# Patient Record
Sex: FEMALE | Race: ASIAN | HISPANIC OR LATINO | Employment: PART TIME | URBAN - METROPOLITAN AREA
[De-identification: names, ages, dates, MRNs, and addresses within clinical notes are randomized per-mention and may not be internally consistent; named-entity substitution may affect disease eponyms.]

---

## 2020-03-20 ENCOUNTER — HOSPITAL ENCOUNTER (OUTPATIENT)
Dept: URGENT CARE | Facility: CLINIC | Age: 25
Discharge: HOME | End: 2020-03-20

## 2020-03-22 LAB — BACTERIA SPEC AEROBE CULT: NORMAL

## 2021-02-19 ENCOUNTER — HOSPITAL ENCOUNTER (OUTPATIENT)
Dept: OTHER | Facility: HOSPITAL | Age: 26
Discharge: HOME | End: 2021-02-19
Attending: NURSE PRACTITIONER

## 2021-02-22 LAB — HBV SURFACE AB SER QL: REACTIVE

## 2021-06-14 LAB
BACTERIA SPEC AEROBE CULT: NO GROWTH
C TRACH RRNA SPEC DONR QL NAA+PROBE: NORMAL
EXTERNAL ABO GROUPING: NORMAL
EXTERNAL ANTIBODY SCREEN: NORMAL
EXTERNAL HEPATITIS B SURFACE ANTIGEN: NEGATIVE
EXTERNAL RH FACTOR: POSITIVE
EXTERNAL SYPHILIS RPR SCREEN: NEGATIVE
HCV AB S/CO SERPL IA: NORMAL
HIV 1+2 AB+HIV1 P24 AG SERPL QL IA: NORMAL
N GONORRHOEA DNA SPEC QL NAA+PROBE: NORMAL
RUBV IGG SERPL IA-ACNC: NORMAL

## 2021-09-23 ENCOUNTER — ROUTINE PRENATAL (OUTPATIENT)
Dept: OBSTETRICS AND GYNECOLOGY | Facility: CLINIC | Age: 26
End: 2021-09-23

## 2021-09-23 VITALS — WEIGHT: 143 LBS | SYSTOLIC BLOOD PRESSURE: 115 MMHG | DIASTOLIC BLOOD PRESSURE: 75 MMHG

## 2021-09-23 DIAGNOSIS — Z3A.24 24 WEEKS GESTATION OF PREGNANCY: Primary | ICD-10-CM

## 2021-09-23 LAB
DEPRECATED RDW RBC AUTO: 40.4 FL (ref 37–54)
ERYTHROCYTE [DISTWIDTH] IN BLOOD BY AUTOMATED COUNT: 12.5 % (ref 12.3–15.4)
GLUCOSE 1H P GLC SERPL-MCNC: 123 MG/DL (ref 65–139)
GLUCOSE UR STRIP-MCNC: NEGATIVE MG/DL
HCT VFR BLD AUTO: 30.3 % (ref 34–46.6)
HGB BLD-MCNC: 10.2 G/DL (ref 12–15.9)
MCH RBC QN AUTO: 30 PG (ref 26.6–33)
MCHC RBC AUTO-ENTMCNC: 33.7 G/DL (ref 31.5–35.7)
MCV RBC AUTO: 89.1 FL (ref 79–97)
PLATELET # BLD AUTO: 141 10*3/MM3 (ref 140–450)
PMV BLD AUTO: 10.5 FL (ref 6–12)
PROT UR STRIP-MCNC: NEGATIVE MG/DL
RBC # BLD AUTO: 3.4 10*6/MM3 (ref 3.77–5.28)
WBC # BLD AUTO: 7.39 10*3/MM3 (ref 3.4–10.8)

## 2021-09-23 PROCEDURE — 85027 COMPLETE CBC AUTOMATED: CPT | Performed by: OBSTETRICS & GYNECOLOGY

## 2021-09-23 PROCEDURE — 0502F SUBSEQUENT PRENATAL CARE: CPT | Performed by: OBSTETRICS & GYNECOLOGY

## 2021-09-23 PROCEDURE — 82950 GLUCOSE TEST: CPT | Performed by: OBSTETRICS & GYNECOLOGY

## 2021-09-23 RX ORDER — PRENATAL VIT/IRON FUM/FOLIC AC 27MG-0.8MG
1 TABLET ORAL DAILY
COMMUNITY

## 2021-09-23 NOTE — PROGRESS NOTES
OB FOLLOW UP  CC- Here for care of pregnancy        Anna TOURE is a 26 y.o.  24w0d patient being seen today for her obstetrical follow up visit. Patient reports no complaints.     Her prenatal care is complicated by (and status) :    There is no problem list on file for this patient.      Flu Status: Desires at future appt  Covid Status:    ROS -   Patient Reports : No Problems  Patient Denies: Loss of Fluid, Vaginal Spotting, Vision Changes, Headaches, Nausea , Vomiting , Contractions and Epigastric pain  Fetal Movement : normal  All other systems reviewed and are negative.       The additional following portions of the patient's history were reviewed and updated as appropriate: allergies, current medications, past family history, past medical history, past social history, past surgical history and problem list.    I have reviewed and agree with the HPI, ROS, and historical information as entered above. My Rico DO    /75   Wt 64.9 kg (143 lb)   LMP 2021       EXAM:     FHT: 156 BPM   Uterine Size: 24 cm  Pelvic Exam: No    Urine glucose/protein: See prenatal flowsheet       Assessment and Plan  Diagnoses and all orders for this visit:    1. 24 weeks gestation of pregnancy (Primary)  -     POC Urinalysis Dipstick  -     Gestational Diabetes Screen; Future  -     CBC (No Diff); Future         1. Pregnancy at 24w0d  2. Fetal status reassuring.   3. Activity and Exercise discussed.  4. Return in about 4 weeks (around 10/21/2021).   5. Continue prenatal vitamins    My Rico DO  2021

## 2021-09-24 ENCOUNTER — TELEPHONE (OUTPATIENT)
Dept: OBSTETRICS AND GYNECOLOGY | Facility: CLINIC | Age: 26
End: 2021-09-24

## 2021-09-24 DIAGNOSIS — O99.019 IRON DEFICIENCY ANEMIA DURING PREGNANCY: Primary | ICD-10-CM

## 2021-09-24 DIAGNOSIS — D50.9 IRON DEFICIENCY ANEMIA DURING PREGNANCY: Primary | ICD-10-CM

## 2021-09-24 RX ORDER — FERROUS SULFATE 325(65) MG
325 TABLET ORAL EVERY OTHER DAY
Qty: 30 TABLET | Refills: 1 | Status: SHIPPED | OUTPATIENT
Start: 2021-09-24 | End: 2022-01-24

## 2021-09-24 NOTE — TELEPHONE ENCOUNTER
----- Message from My Rico DO sent at 9/24/2021  9:03 AM EDT -----  No pharmacy has been listed.  Please obtain a pharmacy so I can prescribe iron

## 2021-10-21 ENCOUNTER — ROUTINE PRENATAL (OUTPATIENT)
Dept: OBSTETRICS AND GYNECOLOGY | Facility: CLINIC | Age: 26
End: 2021-10-21

## 2021-10-21 VITALS — WEIGHT: 146 LBS | SYSTOLIC BLOOD PRESSURE: 121 MMHG | DIASTOLIC BLOOD PRESSURE: 76 MMHG

## 2021-10-21 DIAGNOSIS — Z3A.28 28 WEEKS GESTATION OF PREGNANCY: Primary | ICD-10-CM

## 2021-10-21 DIAGNOSIS — D50.9 IRON DEFICIENCY ANEMIA DURING PREGNANCY: ICD-10-CM

## 2021-10-21 DIAGNOSIS — O99.019 IRON DEFICIENCY ANEMIA DURING PREGNANCY: ICD-10-CM

## 2021-10-21 PROBLEM — Z34.90 SUPERVISION OF NORMAL PREGNANCY: Status: ACTIVE | Noted: 2021-10-21

## 2021-10-21 LAB
GLUCOSE UR STRIP-MCNC: NEGATIVE MG/DL
PROT UR STRIP-MCNC: NEGATIVE MG/DL

## 2021-10-21 PROCEDURE — 0502F SUBSEQUENT PRENATAL CARE: CPT | Performed by: OBSTETRICS & GYNECOLOGY

## 2021-10-21 NOTE — PROGRESS NOTES
OB FOLLOW UP  CC- Here for care of pregnancy        Anna TOURE is a 26 y.o.  28w0d patient being seen today for her obstetrical follow up visit. Patient reports no complaints.     Her prenatal care is complicated by (and status) :    Patient Active Problem List   Diagnosis   • Supervision of normal pregnancy       Flu Status: Desires at future appt  Covid Status:    ROS -   Patient Reports : No Problems  Patient Denies: Loss of Fluid, Vaginal Spotting, Vision Changes, Headaches, Nausea , Vomiting , Contractions and Epigastric pain  Fetal Movement : normal  All other systems reviewed and are negative.       The additional following portions of the patient's history were reviewed and updated as appropriate: allergies, current medications, past family history, past medical history, past social history, past surgical history and problem list.    I have reviewed and agree with the HPI, ROS, and historical information as entered above. My Rico DO    /76   Wt 66.2 kg (146 lb)   LMP 2021       EXAM:     FHT: 155 BPM   Uterine Size: 32 cm  Pelvic Exam: No  1 hour glucola nml Hgb 10 Hct 30  Urine glucose/protein: See prenatal flowsheet       Assessment and Plan  Diagnoses and all orders for this visit:    1. 28 weeks gestation of pregnancy (Primary)    2. Iron deficiency anemia during pregnancy  -     POC Urinalysis Dipstick         1. Pregnancy at 28w0d  2. Fetal status reassuring.   3. Activity and Exercise discussed.  4. Return in about 2 weeks (around 2021) for Please schedule q 2 wks 36 wk & wks til del, PLEASE SCHEDULE ULTRASOUND AT 36-37 WEEKS.   5. Continue with prenatal vitamins and iron    My Rico DO  10/21/2021

## 2021-11-05 ENCOUNTER — ROUTINE PRENATAL (OUTPATIENT)
Dept: OBSTETRICS AND GYNECOLOGY | Facility: CLINIC | Age: 26
End: 2021-11-05

## 2021-11-05 VITALS — SYSTOLIC BLOOD PRESSURE: 123 MMHG | WEIGHT: 150 LBS | DIASTOLIC BLOOD PRESSURE: 78 MMHG

## 2021-11-05 DIAGNOSIS — Z34.80 SUPERVISION OF OTHER NORMAL PREGNANCY: Primary | ICD-10-CM

## 2021-11-05 DIAGNOSIS — B00.9 HSV-1 INFECTION: ICD-10-CM

## 2021-11-05 DIAGNOSIS — O99.019 IRON DEFICIENCY ANEMIA DURING PREGNANCY: ICD-10-CM

## 2021-11-05 DIAGNOSIS — Z34.03 ENCOUNTER FOR SUPERVISION OF NORMAL FIRST PREGNANCY IN THIRD TRIMESTER: ICD-10-CM

## 2021-11-05 DIAGNOSIS — D50.9 IRON DEFICIENCY ANEMIA DURING PREGNANCY: ICD-10-CM

## 2021-11-05 LAB
GLUCOSE UR STRIP-MCNC: NEGATIVE MG/DL
PROT UR STRIP-MCNC: NEGATIVE MG/DL

## 2021-11-05 PROCEDURE — 0502F SUBSEQUENT PRENATAL CARE: CPT | Performed by: STUDENT IN AN ORGANIZED HEALTH CARE EDUCATION/TRAINING PROGRAM

## 2021-11-05 NOTE — PROGRESS NOTES
OB FOLLOW UP  Complaint   Chief Complaint   Patient presents with   • Routine Prenatal Visit            Anna TOURE is a 26 y.o.  30w1d patient being seen today for her obstetrical follow up visit. Patient denies decreased fetal movement, contractions, loss of fluid or vaginal bleeding.     Her prenatal care is complicated by (and status) :    Patient Active Problem List   Diagnosis   • Supervision of normal pregnancy   • HSV-1 infection   • Iron deficiency anemia during pregnancy       All other systems reviewed and are negative.     The additional following portions of the patient's history were reviewed and updated as appropriate: allergies, current medications, past family history, past medical history, past social history, past surgical history and problem list.      EXAM:     Vital signs: /78   Wt 68 kg (150 lb)   LMP 2021   Appearance/psychiatric: To be in no distress  Constitutional: The patient is well nourished.  Cardiovascular: She does not have edema.  Respiratory: Respiratory effort is normal.  Gastrointestinal: Abdomen is soft, gravid, nontender, no rashes, heart tones are present, fundal height is size equals dates    Pelvic Exam: No    Urine glucose/protein: See prenatal flowsheet       Assessment and Plan    Problem List Items Addressed This Visit        Gravid and     Supervision of normal pregnancy    Overview     COLLINS finalized: L=20  O+/neg  Genetic testing:    • NIPS wnl XX    Vaccinations:  • COVID:  2021; Pfizer  • Influenza: 2021 10/4/2021  • Tdap: 2021 10/29    24-28 weeks:  • Tdap Rx:  • Rhogam:  Not indicated  • ?BTL/Bilat salpingectomy:    Third Trimester:  • GBS:  • Breast pump:  • ?HSV: HSV-1     Ultrasound:  • Anatomy:  • FU US:    PROBLEM LIST/PLAN:                  Hematology and Neoplasia    Iron deficiency anemia during pregnancy    Overview     2021: Repeat 32 weeks.          Relevant Medications    ferrous sulfate 325 (65  FE) MG tablet       Other    HSV-1 infection    Overview     11/5/2021 History of HSV-1; does perform oral intercourse. Suppression recommended at 36 weeks            Other Visit Diagnoses     Supervision of other normal pregnancy    -  Primary    Relevant Orders    POC Urinalysis Dipstick (Completed)          Impression  1. Pregnancy at 30w1d  2. Fetal status reassuring.   3. Activity and Exercise discussed.    Plan  1.  Follow-up 2 weeks  2.  Repeat CBC at 32 weeks  3.  REcommend suppression with Valtrex at 36 weeks      Patient was counseled to the following pregnancy precautions:  • Decreased fetal movement, if concern for decreased fetal movement please perform fetal kick counts you are looking for 10 movements in 2 hours.  If concern for fetal movement and not meeting that criteria, please present to triage for evaluation.  • Contractions occurring every 5 minutes for over an hour, lasting 30 to 60 seconds and progressively causing more discomfort, please seek medical attention to rule out labor  • If you believe that your water is broken, place a sanitary pad.  If pad fills in short period of time i.e. less than 5 minutes, take off pad placed another pad.  If this is saturated please present for rule out rupture of membranes  • Vaginal bleeding can be normal in pregnancy, this usually takes a form of spotting.  If having heavier bleeding like a menstrual period please present for evaluation; especially in light of severe abdominal pain this could represent a placental abruption.  • Keep all scheduled appointments as recommended.        Keenan Coto MD  11/05/2021

## 2021-11-19 ENCOUNTER — ROUTINE PRENATAL (OUTPATIENT)
Dept: OBSTETRICS AND GYNECOLOGY | Facility: CLINIC | Age: 26
End: 2021-11-19

## 2021-11-19 VITALS — DIASTOLIC BLOOD PRESSURE: 77 MMHG | SYSTOLIC BLOOD PRESSURE: 115 MMHG | WEIGHT: 154 LBS

## 2021-11-19 DIAGNOSIS — O99.019 IRON DEFICIENCY ANEMIA DURING PREGNANCY: ICD-10-CM

## 2021-11-19 DIAGNOSIS — R03.0 ELEVATED BP WITHOUT DIAGNOSIS OF HYPERTENSION: ICD-10-CM

## 2021-11-19 DIAGNOSIS — B00.9 HSV-1 INFECTION: ICD-10-CM

## 2021-11-19 DIAGNOSIS — Z34.80 SUPERVISION OF OTHER NORMAL PREGNANCY: ICD-10-CM

## 2021-11-19 DIAGNOSIS — D50.9 IRON DEFICIENCY ANEMIA DURING PREGNANCY: ICD-10-CM

## 2021-11-19 DIAGNOSIS — Z34.03 ENCOUNTER FOR SUPERVISION OF NORMAL FIRST PREGNANCY IN THIRD TRIMESTER: Primary | ICD-10-CM

## 2021-11-19 LAB
DEPRECATED RDW RBC AUTO: 39.4 FL (ref 37–54)
ERYTHROCYTE [DISTWIDTH] IN BLOOD BY AUTOMATED COUNT: 12.1 % (ref 12.3–15.4)
GLUCOSE UR STRIP-MCNC: NEGATIVE MG/DL
HCT VFR BLD AUTO: 33.9 % (ref 34–46.6)
HGB BLD-MCNC: 11.8 G/DL (ref 12–15.9)
MCH RBC QN AUTO: 31 PG (ref 26.6–33)
MCHC RBC AUTO-ENTMCNC: 34.8 G/DL (ref 31.5–35.7)
MCV RBC AUTO: 89 FL (ref 79–97)
PLATELET # BLD AUTO: 154 10*3/MM3 (ref 140–450)
PMV BLD AUTO: 11.1 FL (ref 6–12)
PROT UR STRIP-MCNC: NEGATIVE MG/DL
RBC # BLD AUTO: 3.81 10*6/MM3 (ref 3.77–5.28)
WBC NRBC COR # BLD: 10.19 10*3/MM3 (ref 3.4–10.8)

## 2021-11-19 PROCEDURE — 0502F SUBSEQUENT PRENATAL CARE: CPT | Performed by: STUDENT IN AN ORGANIZED HEALTH CARE EDUCATION/TRAINING PROGRAM

## 2021-11-19 PROCEDURE — 85027 COMPLETE CBC AUTOMATED: CPT | Performed by: STUDENT IN AN ORGANIZED HEALTH CARE EDUCATION/TRAINING PROGRAM

## 2021-11-19 NOTE — PROGRESS NOTES
OB FOLLOW UP  Complaint   Chief Complaint   Patient presents with   • Routine Prenatal Visit            Anna TOURE is a 26 y.o.  32w1d patient being seen today for her obstetrical follow up visit. Patient denies decreased fetal movement, contractions, loss of fluid or vaginal bleeding.  Patient denies any headache, visual disturbances, new onset nausea vomiting, right upper quadrant pain, or new onset swelling.      Her prenatal care is complicated by (and status) :    Patient Active Problem List   Diagnosis   • Supervision of normal pregnancy   • HSV-1 infection   • Iron deficiency anemia during pregnancy   • Elevated BP without diagnosis of hypertension       All other systems reviewed and are negative.     The additional following portions of the patient's history were reviewed and updated as appropriate: allergies, current medications, past family history, past medical history, past social history, past surgical history and problem list.      EXAM:     Vital signs: /77   Wt 69.9 kg (154 lb)   LMP 2021   Appearance/psychiatric: To be in no distress  Constitutional: The patient is well nourished.  Cardiovascular: She does not have edema.  Respiratory: Respiratory effort is normal.  Gastrointestinal: Abdomen is soft, gravid, nontender, no rashes, heart tones are present, fundal height is size equals dates    Pelvic Exam: No    Urine glucose/protein: See prenatal flowsheet       Assessment and Plan    Problem List Items Addressed This Visit        Cardiac and Vasculature    Elevated BP without diagnosis of hypertension    Overview     2021 mildly elevated diastolic.  Taken when patient presented to room.            Gravid and     Supervision of normal pregnancy - Primary    Overview     COLLINS finalized: L=20  O+/neg  Genetic testing:    • NIPS wnl XX    Vaccinations:  • COVID:  2021; Pfizer  • Influenza: 2021 10/4/2021  • Tdap: 2021 10/29    24-28  weeks:  • Tdap Rx:  • Rhogam:  Not indicated  • ?BTL/Bilat salpingectomy:    Third Trimester:  • GBS:  • Breast pump:  • ?HSV: HSV-1     Ultrasound:  • Anatomy:  • FU US:    PROBLEM LIST/PLAN:                  Hematology and Neoplasia    Iron deficiency anemia during pregnancy    Overview     11/5/2021: Repeat 32 weeks.          Relevant Medications    ferrous sulfate 325 (65 FE) MG tablet    Other Relevant Orders    CBC (No Diff)       Other    HSV-1 infection    Overview     11/5/2021 History of HSV-1; does perform oral intercourse. Suppression recommended at 36 weeks            Other Visit Diagnoses     Supervision of other normal pregnancy        Relevant Orders    POC Urinalysis Dipstick (Completed)          Impression  1. Pregnancy at 32w1d  2. Fetal status reassuring.   3. Activity and Exercise discussed.    Plan  1.  Repeat CBC today for anemia evaluation.  2.  Follow-up 2 weeks      Patient was counseled to the following pregnancy precautions:  • Decreased fetal movement, if concern for decreased fetal movement please perform fetal kick counts you are looking for 10 movements in 2 hours.  If concern for fetal movement and not meeting that criteria, please present to triage for evaluation.  • Contractions occurring every 5 minutes for over an hour, lasting 30 to 60 seconds and progressively causing more discomfort, please seek medical attention to rule out labor  • If you believe that your water is broken, place a sanitary pad.  If pad fills in short period of time i.e. less than 5 minutes, take off pad placed another pad.  If this is saturated please present for rule out rupture of membranes  • Vaginal bleeding can be normal in pregnancy, this usually takes a form of spotting.  If having heavier bleeding like a menstrual period please present for evaluation; especially in light of severe abdominal pain this could represent a placental abruption.  • Keep all scheduled appointments as recommended.        Keenan  Rashaun Coto MD  11/19/2021

## 2021-12-03 ENCOUNTER — ROUTINE PRENATAL (OUTPATIENT)
Dept: OBSTETRICS AND GYNECOLOGY | Facility: CLINIC | Age: 26
End: 2021-12-03

## 2021-12-03 VITALS — WEIGHT: 157 LBS | DIASTOLIC BLOOD PRESSURE: 75 MMHG | SYSTOLIC BLOOD PRESSURE: 117 MMHG

## 2021-12-03 DIAGNOSIS — Z34.03 ENCOUNTER FOR SUPERVISION OF NORMAL FIRST PREGNANCY IN THIRD TRIMESTER: Primary | ICD-10-CM

## 2021-12-03 DIAGNOSIS — Z3A.34 34 WEEKS GESTATION OF PREGNANCY: ICD-10-CM

## 2021-12-03 DIAGNOSIS — O99.019 IRON DEFICIENCY ANEMIA DURING PREGNANCY: ICD-10-CM

## 2021-12-03 DIAGNOSIS — D50.9 IRON DEFICIENCY ANEMIA DURING PREGNANCY: ICD-10-CM

## 2021-12-03 DIAGNOSIS — B00.9 HSV-1 INFECTION: ICD-10-CM

## 2021-12-03 DIAGNOSIS — R03.0 ELEVATED BP WITHOUT DIAGNOSIS OF HYPERTENSION: ICD-10-CM

## 2021-12-03 LAB
GLUCOSE UR STRIP-MCNC: NEGATIVE MG/DL
PROT UR STRIP-MCNC: NEGATIVE MG/DL

## 2021-12-03 PROCEDURE — 0502F SUBSEQUENT PRENATAL CARE: CPT | Performed by: OBSTETRICS & GYNECOLOGY

## 2021-12-03 NOTE — PROGRESS NOTES
OB FOLLOW UP  CC- Here for care of pregnancy        Anna TOURE is a 26 y.o.  34w1d patient being seen today for her obstetrical follow up visit. Patient reports heartburn declines medication.     Her prenatal care is complicated by (and status) :    Patient Active Problem List   Diagnosis   • Supervision of normal pregnancy   • HSV-1 infection   • Iron deficiency anemia during pregnancy   • Elevated BP without diagnosis of hypertension       Flu Status: Desires at future appt  Covid Status: has appointment for booster next week    ROS -   Patient Reports : No Problems  Patient Denies: Loss of Fluid, Vaginal Spotting, Vision Changes, Headaches, Nausea , Vomiting , Contractions and Epigastric pain  Fetal Movement : normal  All other systems reviewed and are negative.       The additional following portions of the patient's history were reviewed and updated as appropriate: allergies, current medications, past family history, past medical history, past social history, past surgical history and problem list.    I have reviewed and agree with the HPI, ROS, and historical information as entered above. My Rico, DO    /75   Wt 71.2 kg (157 lb)   LMP 2021       EXAM:     FHT: 152 BPM   Uterine Size: 31 cm  Pelvic Exam: No    Urine glucose/protein: See prenatal flowsheet       Assessment and Plan  Diagnoses and all orders for this visit:    1. Encounter for supervision of normal first pregnancy in third trimester (Primary)  Overview:  COLLINS finalized: L=20  O+/neg  Genetic testing:    • NIPS wnl XX    Vaccinations:  • COVID:  2021; Pfizer  • Influenza: 2021 10/4/2021  • Tdap: 2021 10/29    24-28 weeks:  • Tdap Rx:  • Rhogam:  Not indicated  • ?BTL/Bilat salpingectomy:    Third Trimester:  • GBS:  • Breast pump:  • ?HSV: HSV-1     Ultrasound:  • Anatomy:  • FU US:    PROBLEM LIST/PLAN:          Orders:  -     POC Urinalysis Dipstick    2. Elevated BP without diagnosis of  hypertension  Overview:  11/19/2021 mildly elevated diastolic.  Taken when patient presented to room.      3. HSV-1 infection  Overview:  11/5/2021 History of HSV-1; does perform oral intercourse. Suppression recommended at 36 weeks       4. Iron deficiency anemia during pregnancy  Overview:  11/5/2021: Repeat 32 weeks. -Improvement on iron supplementation continue      5. 34 weeks gestation of pregnancy         1. Pregnancy at 34w1d  2. Fetal status reassuring.   3. Activity and Exercise discussed.  4. Return for Please schedule q 2 wks 36 wk & wks til del, PLEASE SCHEDULE ULTRASOUND AT 36-37 WEEKS.    My Rico, DO  12/03/2021

## 2021-12-08 ENCOUNTER — APPOINTMENT (OUTPATIENT)
Dept: VACCINE CLINIC | Facility: HOSPITAL | Age: 26
End: 2021-12-08

## 2021-12-17 ENCOUNTER — HOSPITAL ENCOUNTER (OUTPATIENT)
Facility: HOSPITAL | Age: 26
Discharge: HOME | End: 2021-12-17
Attending: OBSTETRICS & GYNECOLOGY | Admitting: OBSTETRICS & GYNECOLOGY

## 2021-12-17 ENCOUNTER — ROUTINE PRENATAL (OUTPATIENT)
Dept: OBSTETRICS AND GYNECOLOGY | Facility: CLINIC | Age: 26
End: 2021-12-17

## 2021-12-17 VITALS — SYSTOLIC BLOOD PRESSURE: 127 MMHG | WEIGHT: 160 LBS | DIASTOLIC BLOOD PRESSURE: 86 MMHG

## 2021-12-17 VITALS — HEART RATE: 105 BPM | SYSTOLIC BLOOD PRESSURE: 125 MMHG | DIASTOLIC BLOOD PRESSURE: 84 MMHG

## 2021-12-17 DIAGNOSIS — O13.3 GESTATIONAL HYPERTENSION, THIRD TRIMESTER: ICD-10-CM

## 2021-12-17 DIAGNOSIS — Z34.03 ENCOUNTER FOR SUPERVISION OF NORMAL FIRST PREGNANCY IN THIRD TRIMESTER: Primary | ICD-10-CM

## 2021-12-17 DIAGNOSIS — Z3A.36 36 WEEKS GESTATION OF PREGNANCY: ICD-10-CM

## 2021-12-17 DIAGNOSIS — R03.0 ELEVATED BP WITHOUT DIAGNOSIS OF HYPERTENSION: ICD-10-CM

## 2021-12-17 DIAGNOSIS — O99.019 IRON DEFICIENCY ANEMIA DURING PREGNANCY: ICD-10-CM

## 2021-12-17 DIAGNOSIS — D50.9 IRON DEFICIENCY ANEMIA DURING PREGNANCY: ICD-10-CM

## 2021-12-17 DIAGNOSIS — B00.9 HSV-1 INFECTION: ICD-10-CM

## 2021-12-17 LAB
ALBUMIN SERPL-MCNC: 3.4 G/DL (ref 3.5–5.2)
ALBUMIN/GLOB SERPL: 1.4 G/DL
ALP SERPL-CCNC: 151 U/L (ref 39–117)
ALT SERPL W P-5'-P-CCNC: 13 U/L (ref 1–33)
ANION GAP SERPL CALCULATED.3IONS-SCNC: 11.2 MMOL/L (ref 5–15)
AST SERPL-CCNC: 21 U/L (ref 1–32)
BILIRUB SERPL-MCNC: 0.2 MG/DL (ref 0–1.2)
BUN SERPL-MCNC: 8 MG/DL (ref 6–20)
BUN/CREAT SERPL: 12.5 (ref 7–25)
CALCIUM SPEC-SCNC: 8.7 MG/DL (ref 8.6–10.5)
CHLORIDE SERPL-SCNC: 109 MMOL/L (ref 98–107)
CO2 SERPL-SCNC: 19.8 MMOL/L (ref 22–29)
CREAT SERPL-MCNC: 0.64 MG/DL (ref 0.57–1)
CREAT UR-MCNC: 35.4 MG/DL
DEPRECATED RDW RBC AUTO: 41.7 FL (ref 37–54)
ERYTHROCYTE [DISTWIDTH] IN BLOOD BY AUTOMATED COUNT: 12.9 % (ref 12.3–15.4)
GFR SERPL CREATININE-BSD FRML MDRD: 110 ML/MIN/1.73
GFR SERPL CREATININE-BSD FRML MDRD: 134 ML/MIN/1.73
GLOBULIN UR ELPH-MCNC: 2.4 GM/DL
GLUCOSE SERPL-MCNC: 120 MG/DL (ref 65–99)
GLUCOSE UR STRIP-MCNC: NEGATIVE MG/DL
HCT VFR BLD AUTO: 35.4 % (ref 34–46.6)
HGB BLD-MCNC: 12.4 G/DL (ref 12–15.9)
MCH RBC QN AUTO: 31 PG (ref 26.6–33)
MCHC RBC AUTO-ENTMCNC: 35 G/DL (ref 31.5–35.7)
MCV RBC AUTO: 88.5 FL (ref 79–97)
PLATELET # BLD AUTO: 150 10*3/MM3 (ref 140–450)
PMV BLD AUTO: 11.1 FL (ref 6–12)
POTASSIUM SERPL-SCNC: 3.9 MMOL/L (ref 3.5–5.2)
PROT ?TM UR-MCNC: 4.3 MG/DL
PROT SERPL-MCNC: 5.8 G/DL (ref 6–8.5)
PROT UR STRIP-MCNC: NEGATIVE MG/DL
PROT/CREAT UR: 0.1 MG/G{CREAT}
RBC # BLD AUTO: 4 10*6/MM3 (ref 3.77–5.28)
SODIUM SERPL-SCNC: 140 MMOL/L (ref 136–145)
WBC NRBC COR # BLD: 8.83 10*3/MM3 (ref 3.4–10.8)

## 2021-12-17 PROCEDURE — 85027 COMPLETE CBC AUTOMATED: CPT | Performed by: OBSTETRICS & GYNECOLOGY

## 2021-12-17 PROCEDURE — 80053 COMPREHEN METABOLIC PANEL: CPT | Performed by: OBSTETRICS & GYNECOLOGY

## 2021-12-17 PROCEDURE — 59025 FETAL NON-STRESS TEST: CPT | Performed by: OBSTETRICS & GYNECOLOGY

## 2021-12-17 PROCEDURE — 0502F SUBSEQUENT PRENATAL CARE: CPT | Performed by: OBSTETRICS & GYNECOLOGY

## 2021-12-17 PROCEDURE — G0463 HOSPITAL OUTPT CLINIC VISIT: HCPCS

## 2021-12-17 PROCEDURE — 87081 CULTURE SCREEN ONLY: CPT | Performed by: OBSTETRICS & GYNECOLOGY

## 2021-12-17 PROCEDURE — 59025 FETAL NON-STRESS TEST: CPT

## 2021-12-17 PROCEDURE — 82570 ASSAY OF URINE CREATININE: CPT | Performed by: OBSTETRICS & GYNECOLOGY

## 2021-12-17 PROCEDURE — 84156 ASSAY OF PROTEIN URINE: CPT | Performed by: OBSTETRICS & GYNECOLOGY

## 2021-12-17 NOTE — SIGNIFICANT NOTE
12/17/21 1524   Nonstress Test   Uterine Irritability No   Contractions Not present   Fetal Assessment   Fetal Movement active   Fetal HR Assessment Method external   Fetal HR (beats/min) 150   Fetal Heart Baseline Rate normal range   Fetal HR Variability moderate (amplitude range 6 to 25 bpm)   Fetal HR Accelerations lasting at least 15 seconds   Fetal HR Decelerations absent   Interpretation A   Nonstress Test Interpretation A Reactive   /84   Pulse 105   LMP 04/08/2021   36w1d  Reason for test:    Date of Test: 12/17/2021  Time frame of test: 9978-4153  RN NST Interpretation: (P) Reactive

## 2021-12-17 NOTE — PROGRESS NOTES
OB FOLLOW UP  CC- Here for care of pregnancy        Anna TOURE is a 26 y.o.  36w1d patient being seen today for her obstetrical follow up visit. Patient reports no complaints.     Her prenatal care is complicated by (and status) :    Patient Active Problem List   Diagnosis   • Supervision of normal pregnancy   • HSV-1 infection   • Iron deficiency anemia during pregnancy   • Elevated BP without diagnosis of hypertension       Flu Status: Desires at future appt  Covid Status:    ROS -   Patient Reports : No Problems  Patient Denies: Loss of Fluid, Vaginal Spotting, Vision Changes, Headaches, Nausea , Vomiting , Contractions and Epigastric pain  Fetal Movement : normal  All other systems reviewed and are negative.       The additional following portions of the patient's history were reviewed and updated as appropriate: allergies, current medications, past family history, past medical history, past social history, past surgical history and problem list.    I have reviewed and agree with the HPI, ROS, and historical information as entered above. My Rico, DO    /86   Wt 72.6 kg (160 lb)   LMP 2021       EXAM:     FHT: 168 BPM   Uterine Size: size equals dates  Pelvic Exam: Yes.  Presentation: cephalic. Dilation: Closed. Effacement: Long. Station: Floating.    Urine glucose/protein: See prenatal flowsheet       Assessment and Plan  Diagnoses and all orders for this visit:    1. Encounter for supervision of normal first pregnancy in third trimester (Primary)  Overview:  COLLINS finalized: L=20  O+/neg  Genetic testing:    • NIPS wnl XX    Vaccinations:  • COVID:  2021; Pfizer  • Influenza: 2021 10/4/2021  • Tdap: 2021 10/29    24-28 weeks:  • Tdap Rx:  • Rhogam:  Not indicated  • ?BTL/Bilat salpingectomy:    Third Trimester:  • GBS:  • Breast pump:  • ?HSV: HSV-1     Ultrasound:  • Anatomy:  • FU US:21 EFW 2827 gms 62nd % . JULIETA 22.26  + cardiac activity @ 168 . Anterior  placenta  Grade 1 vtx presentation,     PROBLEM LIST/PLAN:          Orders:  -     Group B Streptococcus Culture - Swab, Vaginal/Rectum  -     Cancel: POC Urinalysis Dipstick  -     POC Urinalysis Dipstick    2. Elevated BP without diagnosis of hypertension  Overview:  11/19/2021 mildly elevated diastolic.  Taken when patient presented to room.      3. HSV-1 infection  Overview:  11/5/2021 History of HSV-1; does perform oral intercourse. Suppression recommended at 36 weeks       4. Iron deficiency anemia during pregnancy  Overview:  11/5/2021: Repeat 32 weeks. -Improvement on iron supplementation continue      5. Gestational hypertension, third trimester   2nd elevated blood pressure this pregnancy will send to labor and delivery for NST, cbc, cmp, serial blood pressures and pc ratio. Labor and deivery notified    1. Pregnancy at 36w1d  2. Fetal status reassuring.   3. Activity and Exercise discussed.  Return for Please schedule weekly visits until due date.    My Rico, DO  12/17/2021

## 2021-12-19 NOTE — PROGRESS NOTES
Obstetrical Non-stress Test Interpretation     Name:  Anna TOURE  MRN: [id number]    26 y.o. female  at 36w1d    Indication: Hypertension, scheduled from office    Weeks Gestation: 36w1d     Baseline: 150 BPM  Variability:   Moderate/Normal (amplitude 6-25 bpm)  Accelerations: Present (32 weeks+) 15 x 15 bpm  Decelerations: Absent   Contractions:  Absent    Impression:  Reactive NST      Plan: Discharge to home.  Keep follow up per office instructions.      Jun Chou Sr., MD  2021  15:40 EST

## 2021-12-20 LAB — BACTERIA SPEC AEROBE CULT: NORMAL

## 2021-12-23 ENCOUNTER — HOSPITAL ENCOUNTER (OUTPATIENT)
Facility: HOSPITAL | Age: 26
Discharge: HOME | End: 2021-12-23
Attending: OBSTETRICS & GYNECOLOGY | Admitting: OBSTETRICS & GYNECOLOGY

## 2021-12-23 ENCOUNTER — ROUTINE PRENATAL (OUTPATIENT)
Dept: OBSTETRICS AND GYNECOLOGY | Facility: CLINIC | Age: 26
End: 2021-12-23

## 2021-12-23 VITALS — DIASTOLIC BLOOD PRESSURE: 86 MMHG | HEART RATE: 103 BPM | SYSTOLIC BLOOD PRESSURE: 124 MMHG | RESPIRATION RATE: 18 BRPM

## 2021-12-23 VITALS — SYSTOLIC BLOOD PRESSURE: 135 MMHG | DIASTOLIC BLOOD PRESSURE: 93 MMHG | WEIGHT: 160.8 LBS

## 2021-12-23 DIAGNOSIS — Z34.90 ENCOUNTER FOR SUPERVISION OF NORMAL PREGNANCY, ANTEPARTUM, UNSPECIFIED GRAVIDITY: ICD-10-CM

## 2021-12-23 DIAGNOSIS — R03.0 ELEVATED BP WITHOUT DIAGNOSIS OF HYPERTENSION: ICD-10-CM

## 2021-12-23 DIAGNOSIS — B00.9 HSV-1 INFECTION: ICD-10-CM

## 2021-12-23 DIAGNOSIS — O99.019 IRON DEFICIENCY ANEMIA DURING PREGNANCY: ICD-10-CM

## 2021-12-23 DIAGNOSIS — D50.9 IRON DEFICIENCY ANEMIA DURING PREGNANCY: ICD-10-CM

## 2021-12-23 DIAGNOSIS — Z34.03 ENCOUNTER FOR SUPERVISION OF NORMAL FIRST PREGNANCY IN THIRD TRIMESTER: Primary | ICD-10-CM

## 2021-12-23 DIAGNOSIS — Z3A.37 37 WEEKS GESTATION OF PREGNANCY: ICD-10-CM

## 2021-12-23 LAB
ALBUMIN SERPL-MCNC: 3.3 G/DL (ref 3.5–5.2)
ALBUMIN/GLOB SERPL: 1.1 G/DL
ALP SERPL-CCNC: 166 U/L (ref 39–117)
ALT SERPL W P-5'-P-CCNC: 20 U/L (ref 1–33)
ANION GAP SERPL CALCULATED.3IONS-SCNC: 11.8 MMOL/L (ref 5–15)
AST SERPL-CCNC: 26 U/L (ref 1–32)
BILIRUB SERPL-MCNC: 0.2 MG/DL (ref 0–1.2)
BUN SERPL-MCNC: 11 MG/DL (ref 6–20)
BUN/CREAT SERPL: 18 (ref 7–25)
CALCIUM SPEC-SCNC: 9.1 MG/DL (ref 8.6–10.5)
CHLORIDE SERPL-SCNC: 103 MMOL/L (ref 98–107)
CO2 SERPL-SCNC: 20.2 MMOL/L (ref 22–29)
CREAT SERPL-MCNC: 0.61 MG/DL (ref 0.57–1)
CREAT UR-MCNC: 45.4 MG/DL
DEPRECATED RDW RBC AUTO: 39.5 FL (ref 37–54)
ERYTHROCYTE [DISTWIDTH] IN BLOOD BY AUTOMATED COUNT: 12.7 % (ref 12.3–15.4)
GFR SERPL CREATININE-BSD FRML MDRD: 117 ML/MIN/1.73
GFR SERPL CREATININE-BSD FRML MDRD: 142 ML/MIN/1.73
GLOBULIN UR ELPH-MCNC: 2.9 GM/DL
GLUCOSE SERPL-MCNC: 146 MG/DL (ref 65–99)
GLUCOSE UR STRIP-MCNC: NEGATIVE MG/DL
HCT VFR BLD AUTO: 36.7 % (ref 34–46.6)
HGB BLD-MCNC: 13 G/DL (ref 12–15.9)
MCH RBC QN AUTO: 30.4 PG (ref 26.6–33)
MCHC RBC AUTO-ENTMCNC: 35.4 G/DL (ref 31.5–35.7)
MCV RBC AUTO: 85.9 FL (ref 79–97)
PLATELET # BLD AUTO: 150 10*3/MM3 (ref 140–450)
PMV BLD AUTO: 11.1 FL (ref 6–12)
POTASSIUM SERPL-SCNC: 3.8 MMOL/L (ref 3.5–5.2)
PROT ?TM UR-MCNC: 5.5 MG/DL
PROT SERPL-MCNC: 6.2 G/DL (ref 6–8.5)
PROT UR STRIP-MCNC: NEGATIVE MG/DL
PROT/CREAT UR: 0.1 MG/G{CREAT}
RBC # BLD AUTO: 4.27 10*6/MM3 (ref 3.77–5.28)
SODIUM SERPL-SCNC: 135 MMOL/L (ref 136–145)
WBC NRBC COR # BLD: 8.56 10*3/MM3 (ref 3.4–10.8)

## 2021-12-23 PROCEDURE — 59025 FETAL NON-STRESS TEST: CPT

## 2021-12-23 PROCEDURE — 84156 ASSAY OF PROTEIN URINE: CPT | Performed by: OBSTETRICS & GYNECOLOGY

## 2021-12-23 PROCEDURE — 82570 ASSAY OF URINE CREATININE: CPT | Performed by: OBSTETRICS & GYNECOLOGY

## 2021-12-23 PROCEDURE — 85027 COMPLETE CBC AUTOMATED: CPT | Performed by: OBSTETRICS & GYNECOLOGY

## 2021-12-23 PROCEDURE — 0502F SUBSEQUENT PRENATAL CARE: CPT | Performed by: OBSTETRICS & GYNECOLOGY

## 2021-12-23 PROCEDURE — 80053 COMPREHEN METABOLIC PANEL: CPT | Performed by: OBSTETRICS & GYNECOLOGY

## 2021-12-23 PROCEDURE — G0463 HOSPITAL OUTPT CLINIC VISIT: HCPCS

## 2021-12-23 NOTE — SIGNIFICANT NOTE
12/23/21 1659   Nonstress Test   Reason for NST OB Triage  (SENT FROM OFFICE FOR ELEVATED BLOOD PRESSURE)   Acoustic Stimulator No   Uterine Irritability No   Contractions Not present   Fetal Assessment   Fetal Movement active   Fetal HR Assessment Method external   Fetal HR (beats/min) 150   Fetal Heart Baseline Rate normal range   Fetal HR Variability moderate (amplitude range 6 to 25 bpm)   Fetal HR Accelerations lasting at least 15 seconds   Fetal HR Decelerations absent   Interpretation A   Nonstress Test Interpretation A Reactive   /86 (BP Location: Right arm, Patient Position: Lying)   Pulse 103   Resp 18   LMP 04/08/2021   37w0d  Reason for test: (S) (P) OB Triage (SENT FROM OFFICE FOR ELEVATED BLOOD PRESSURE)  Date of Test: 12/23/2021  Time frame of test: 8505-6485  RN NST Interpretation: (P) Reactive      Fetal heart rate 150, reactive

## 2021-12-26 NOTE — PROGRESS NOTES
OB FOLLOW UP  CC- Here for care of pregnancy        Anna TOURE is a 26 y.o.  37w3d patient being seen today for her obstetrical follow up visit. Patient reports no complaints.     Her prenatal care is complicated by (and status) :    Patient Active Problem List   Diagnosis   • Supervision of normal pregnancy   • HSV-1 infection   • Iron deficiency anemia during pregnancy   • Elevated BP without diagnosis of hypertension       Flu Status: Already given in current flu season  Covid Status:    ROS -   Patient Reports : No Problems  Patient Denies: Loss of Fluid, Vaginal Spotting, Vision Changes, Headaches, Nausea , Vomiting , Contractions and Epigastric pain  Fetal Movement : normal  All other systems reviewed and are negative.       The additional following portions of the patient's history were reviewed and updated as appropriate: allergies, current medications, past family history, past medical history, past social history, past surgical history and problem list.    I have reviewed and agree with the HPI, ROS, and historical information as entered above. My Rico, DO    /93   Wt 72.9 kg (160 lb 12.8 oz)   LMP 2021       EXAM:     FHT: 156 BPM   Uterine Size: 36 cm  Pelvic Exam: No    Urine glucose/protein: See prenatal flowsheet       Assessment and Plan  Diagnoses and all orders for this visit:    1. Encounter for supervision of normal first pregnancy in third trimester (Primary)  Overview:  COLLINS finalized: L=20  O+/neg  Genetic testing:    • NIPS wnl XX    Vaccinations:  • COVID:  2021; Pfizer  • Influenza: 2021 10/4/2021  • Tdap: 2021 10/29    24-28 weeks:  • Tdap Rx:  • Rhogam:  Not indicated  • ?BTL/Bilat salpingectomy:    Third Trimester:  • GBS:  • Breast pump:  • ?HSV: HSV-1     Ultrasound:  • Anatomy:  • FU US:    PROBLEM LIST/PLAN:          Orders:  -     POC Urinalysis Dipstick    2. Encounter for supervision of normal pregnancy, antepartum, unspecified    Overview:  COLLINS finalized: L=20  O+/neg  Genetic testing:    • NIPS wnl XX    Vaccinations:  • COVID:  2021; Pfizer  • Influenza: 2021 10/4/2021  • Tdap: 2021 10/29    24-28 weeks:  • Tdap Rx:  • Rhogam:  Not indicated  • ?BTL/Bilat salpingectomy:    Third Trimester:  • GBS:  • Breast pump:  • ?HSV: HSV-1     Ultrasound:  • Anatomy:  • FU US:    PROBLEM LIST/PLAN:            3. Elevated BP without diagnosis of hypertension  Overview:  2021 mildly elevated diastolic.  Taken when patient presented to room.  21 blood pressure elevated will send to labor and delivery for cbc,cmp,pc ratio and nst.  Labor and delivery notified and verbal orders given      4. HSV-1 infection  Overview:  2021 History of HSV-1; does perform oral intercourse. Suppression recommended at 36 weeks       5. Iron deficiency anemia during pregnancy  Overview:  2021: Repeat 32 weeks. -Improvement on iron supplementation continue      6. 37 weeks gestation of pregnancy         1. Pregnancy at 37w3d  2. Fetal status reassuring.   3. Activity and Exercise discussed.  4. Return in about 1 week (around 2021).    My Rico, DO  2021

## 2021-12-30 ENCOUNTER — TRANSCRIBE ORDERS (OUTPATIENT)
Dept: LAB | Facility: HOSPITAL | Age: 26
End: 2021-12-30

## 2021-12-30 ENCOUNTER — HOSPITAL ENCOUNTER (OUTPATIENT)
Facility: HOSPITAL | Age: 26
Discharge: HOME | End: 2021-12-30
Attending: OBSTETRICS & GYNECOLOGY | Admitting: OBSTETRICS & GYNECOLOGY

## 2021-12-30 ENCOUNTER — LAB (OUTPATIENT)
Dept: LAB | Facility: HOSPITAL | Age: 26
End: 2021-12-30

## 2021-12-30 ENCOUNTER — PREP FOR SURGERY (OUTPATIENT)
Dept: OTHER | Facility: HOSPITAL | Age: 26
End: 2021-12-30

## 2021-12-30 ENCOUNTER — ROUTINE PRENATAL (OUTPATIENT)
Dept: OBSTETRICS AND GYNECOLOGY | Facility: CLINIC | Age: 26
End: 2021-12-30

## 2021-12-30 VITALS — WEIGHT: 162 LBS | DIASTOLIC BLOOD PRESSURE: 90 MMHG | SYSTOLIC BLOOD PRESSURE: 128 MMHG

## 2021-12-30 VITALS
RESPIRATION RATE: 18 BRPM | SYSTOLIC BLOOD PRESSURE: 130 MMHG | HEART RATE: 96 BPM | TEMPERATURE: 98.2 F | DIASTOLIC BLOOD PRESSURE: 85 MMHG

## 2021-12-30 DIAGNOSIS — O99.019 IRON DEFICIENCY ANEMIA DURING PREGNANCY: ICD-10-CM

## 2021-12-30 DIAGNOSIS — R03.0 ELEVATED BP WITHOUT DIAGNOSIS OF HYPERTENSION: ICD-10-CM

## 2021-12-30 DIAGNOSIS — D50.9 IRON DEFICIENCY ANEMIA DURING PREGNANCY: ICD-10-CM

## 2021-12-30 DIAGNOSIS — Z34.80 SUPERVISION OF OTHER NORMAL PREGNANCY: ICD-10-CM

## 2021-12-30 DIAGNOSIS — Z01.818 PRE-OP TESTING: ICD-10-CM

## 2021-12-30 DIAGNOSIS — Z01.818 PRE-OP TESTING: Primary | ICD-10-CM

## 2021-12-30 DIAGNOSIS — B00.9 HSV-1 INFECTION: ICD-10-CM

## 2021-12-30 DIAGNOSIS — Z34.03 ENCOUNTER FOR SUPERVISION OF NORMAL FIRST PREGNANCY IN THIRD TRIMESTER: Primary | ICD-10-CM

## 2021-12-30 DIAGNOSIS — O13.3 GESTATIONAL HYPERTENSION, THIRD TRIMESTER: ICD-10-CM

## 2021-12-30 LAB
ALBUMIN SERPL-MCNC: 3.9 G/DL (ref 3.5–5.2)
ALBUMIN/GLOB SERPL: 1.3 G/DL
ALP SERPL-CCNC: 201 U/L (ref 39–117)
ALT SERPL W P-5'-P-CCNC: 14 U/L (ref 1–33)
ANION GAP SERPL CALCULATED.3IONS-SCNC: 12.3 MMOL/L (ref 5–15)
AST SERPL-CCNC: 21 U/L (ref 1–32)
BILIRUB SERPL-MCNC: 0.3 MG/DL (ref 0–1.2)
BUN SERPL-MCNC: 19 MG/DL (ref 6–20)
BUN/CREAT SERPL: 27.5 (ref 7–25)
CALCIUM SPEC-SCNC: 9.5 MG/DL (ref 8.6–10.5)
CHLORIDE SERPL-SCNC: 103 MMOL/L (ref 98–107)
CO2 SERPL-SCNC: 20.7 MMOL/L (ref 22–29)
CREAT SERPL-MCNC: 0.69 MG/DL (ref 0.57–1)
CREAT UR-MCNC: 22.4 MG/DL
DEPRECATED RDW RBC AUTO: 41.6 FL (ref 37–54)
ERYTHROCYTE [DISTWIDTH] IN BLOOD BY AUTOMATED COUNT: 12.8 % (ref 12.3–15.4)
GFR SERPL CREATININE-BSD FRML MDRD: 101 ML/MIN/1.73
GFR SERPL CREATININE-BSD FRML MDRD: 123 ML/MIN/1.73
GLOBULIN UR ELPH-MCNC: 2.9 GM/DL
GLUCOSE SERPL-MCNC: 91 MG/DL (ref 65–99)
GLUCOSE UR STRIP-MCNC: NEGATIVE MG/DL
HCT VFR BLD AUTO: 41 % (ref 34–46.6)
HGB BLD-MCNC: 14.2 G/DL (ref 12–15.9)
MCH RBC QN AUTO: 30.9 PG (ref 26.6–33)
MCHC RBC AUTO-ENTMCNC: 34.6 G/DL (ref 31.5–35.7)
MCV RBC AUTO: 89.1 FL (ref 79–97)
PLATELET # BLD AUTO: 166 10*3/MM3 (ref 140–450)
PMV BLD AUTO: 11.8 FL (ref 6–12)
POTASSIUM SERPL-SCNC: 3.9 MMOL/L (ref 3.5–5.2)
PROT ?TM UR-MCNC: <4 MG/DL
PROT SERPL-MCNC: 6.8 G/DL (ref 6–8.5)
PROT UR STRIP-MCNC: NEGATIVE MG/DL
PROT/CREAT UR: NORMAL MG/G{CREAT}
RBC # BLD AUTO: 4.6 10*6/MM3 (ref 3.77–5.28)
SARS-COV-2 RNA PNL SPEC NAA+PROBE: NOT DETECTED
SODIUM SERPL-SCNC: 136 MMOL/L (ref 136–145)
WBC NRBC COR # BLD: 9.34 10*3/MM3 (ref 3.4–10.8)

## 2021-12-30 PROCEDURE — G0463 HOSPITAL OUTPT CLINIC VISIT: HCPCS

## 2021-12-30 PROCEDURE — 59025 FETAL NON-STRESS TEST: CPT | Performed by: OBSTETRICS & GYNECOLOGY

## 2021-12-30 PROCEDURE — 84156 ASSAY OF PROTEIN URINE: CPT | Performed by: OBSTETRICS & GYNECOLOGY

## 2021-12-30 PROCEDURE — C9803 HOPD COVID-19 SPEC COLLECT: HCPCS

## 2021-12-30 PROCEDURE — 85027 COMPLETE CBC AUTOMATED: CPT | Performed by: OBSTETRICS & GYNECOLOGY

## 2021-12-30 PROCEDURE — 59025 FETAL NON-STRESS TEST: CPT

## 2021-12-30 PROCEDURE — 59426 ANTEPARTUM CARE ONLY: CPT | Performed by: OBSTETRICS & GYNECOLOGY

## 2021-12-30 PROCEDURE — 82570 ASSAY OF URINE CREATININE: CPT | Performed by: OBSTETRICS & GYNECOLOGY

## 2021-12-30 PROCEDURE — 80053 COMPREHEN METABOLIC PANEL: CPT | Performed by: OBSTETRICS & GYNECOLOGY

## 2021-12-30 PROCEDURE — U0004 COV-19 TEST NON-CDC HGH THRU: HCPCS

## 2022-01-01 PROBLEM — O13.3 GESTATIONAL HYPERTENSION, THIRD TRIMESTER: Status: ACTIVE | Noted: 2022-01-01

## 2022-01-01 NOTE — PROGRESS NOTES
OB FOLLOW UP  CC- Here for care of pregnancy        Anna TOURE is a 26 y.o.  38w2d patient being seen today for her obstetrical follow up visit. Patient reports no complaints.     Her prenatal care is complicated by (and status) :    Patient Active Problem List   Diagnosis   • Supervision of normal pregnancy   • HSV-1 infection   • Iron deficiency anemia during pregnancy   • Elevated BP without diagnosis of hypertension   • Gestational hypertension, third trimester       Flu Status: Already given in current flu season  Covid Status:    ROS -   Patient Reports : No Problems  Patient Denies: Loss of Fluid, Vaginal Spotting, Vision Changes, Headaches, Nausea , Vomiting , Contractions and Epigastric pain  Fetal Movement : normal  All other systems reviewed and are negative.       The additional following portions of the patient's history were reviewed and updated as appropriate: allergies, current medications, past family history, past medical history, past social history, past surgical history and problem list.    I have reviewed and agree with the HPI, ROS, and historical information as entered above. My Rico, DO    /90   Wt 73.5 kg (162 lb)   LMP 2021       EXAM:     FHT: 156 BPM   Uterine Size: 36 cm  Pelvic Exam: Yes.  Presentation: cephalic. Dilation: Closed. Effacement: Long. Station: Floating.    Urine glucose/protein: See prenatal flowsheet       Assessment and Plan  Diagnoses and all orders for this visit:    1. Encounter for supervision of normal first pregnancy in third trimester (Primary)  Overview:  COLLINS finalized: L=20  O+/neg  Genetic testing:    • NIPS wnl XX    Vaccinations:  • COVID:  2021; Pfizer  • Influenza: 2021 10/4/2021  • Tdap: 2021 10/29    24-28 weeks:  • Tdap Rx:  • Rhogam:  Not indicated  • ?BTL/Bilat salpingectomy:    Third Trimester:  • GBS: Negative  • Breast pump:  • ?HSV: HSV-1     Ultrasound:  • Anatomy:  • FU US:    PROBLEM  LIST/PLAN:            2. Supervision of other normal pregnancy  -     POC Urinalysis Dipstick    3. Elevated BP without diagnosis of hypertension  Overview:  11/19/2021 mildly elevated diastolic.  Taken when patient presented to room.      4. HSV-1 infection  Overview:  11/5/2021 History of HSV-1; does perform oral intercourse. Suppression recommended at 36 weeks       5. Iron deficiency anemia during pregnancy  Overview:  11/5/2021: Repeat 32 weeks. -Improvement on iron supplementation continue      6. Gestational hypertension, third trimester   To labor and deliviery today for NST, cbc, cmp, pc ratio and NST    for induction of labor if above is normal today due to ghtn at term       1. Pregnancy at 38w2d  2. Fetal status reassuring.   3. Activity and Exercise discussed.  Return in about 1 week (around 1/6/2022).    My Rico, DO  12/30/2021

## 2022-01-04 ENCOUNTER — HOSPITAL ENCOUNTER (INPATIENT)
Facility: HOSPITAL | Age: 27
LOS: 5 days | Discharge: HOME | End: 2022-01-09
Attending: OBSTETRICS & GYNECOLOGY | Admitting: OBSTETRICS & GYNECOLOGY

## 2022-01-04 ENCOUNTER — HOSPITAL ENCOUNTER (INPATIENT)
Dept: LABOR AND DELIVERY | Facility: HOSPITAL | Age: 27
Discharge: HOME | End: 2022-01-04

## 2022-01-04 PROBLEM — O13.9 GESTATIONAL HYPERTENSION AFFECTING FIRST PREGNANCY: Status: ACTIVE | Noted: 2022-01-04

## 2022-01-04 LAB
ABO GROUP BLD: NORMAL
ABO GROUP BLD: NORMAL
ALBUMIN SERPL-MCNC: 3.5 G/DL (ref 3.5–5.2)
ALBUMIN/GLOB SERPL: 1.3 G/DL
ALP SERPL-CCNC: 206 U/L (ref 39–117)
ALT SERPL W P-5'-P-CCNC: 14 U/L (ref 1–33)
ANION GAP SERPL CALCULATED.3IONS-SCNC: 10.7 MMOL/L (ref 5–15)
AST SERPL-CCNC: 23 U/L (ref 1–32)
BILIRUB SERPL-MCNC: 0.2 MG/DL (ref 0–1.2)
BLD GP AB SCN SERPL QL: NEGATIVE
BUN SERPL-MCNC: 10 MG/DL (ref 6–20)
BUN/CREAT SERPL: 14.5 (ref 7–25)
CALCIUM SPEC-SCNC: 9.3 MG/DL (ref 8.6–10.5)
CHLORIDE SERPL-SCNC: 103 MMOL/L (ref 98–107)
CO2 SERPL-SCNC: 22.3 MMOL/L (ref 22–29)
CREAT SERPL-MCNC: 0.69 MG/DL (ref 0.57–1)
DEPRECATED RDW RBC AUTO: 39.9 FL (ref 37–54)
ERYTHROCYTE [DISTWIDTH] IN BLOOD BY AUTOMATED COUNT: 12.6 % (ref 12.3–15.4)
GFR SERPL CREATININE-BSD FRML MDRD: 101 ML/MIN/1.73
GFR SERPL CREATININE-BSD FRML MDRD: 123 ML/MIN/1.73
GLOBULIN UR ELPH-MCNC: 2.8 GM/DL
GLUCOSE SERPL-MCNC: 76 MG/DL (ref 65–99)
HCT VFR BLD AUTO: 38.4 % (ref 34–46.6)
HGB BLD-MCNC: 13.4 G/DL (ref 12–15.9)
MCH RBC QN AUTO: 30.3 PG (ref 26.6–33)
MCHC RBC AUTO-ENTMCNC: 34.9 G/DL (ref 31.5–35.7)
MCV RBC AUTO: 86.9 FL (ref 79–97)
PLATELET # BLD AUTO: 147 10*3/MM3 (ref 140–450)
PMV BLD AUTO: 12.1 FL (ref 6–12)
POTASSIUM SERPL-SCNC: 4.5 MMOL/L (ref 3.5–5.2)
PROT SERPL-MCNC: 6.3 G/DL (ref 6–8.5)
RBC # BLD AUTO: 4.42 10*6/MM3 (ref 3.77–5.28)
RH BLD: POSITIVE
RH BLD: POSITIVE
SODIUM SERPL-SCNC: 136 MMOL/L (ref 136–145)
T&S EXPIRATION DATE: NORMAL
WBC NRBC COR # BLD: 8.48 10*3/MM3 (ref 3.4–10.8)

## 2022-01-04 PROCEDURE — 86901 BLOOD TYPING SEROLOGIC RH(D): CPT

## 2022-01-04 PROCEDURE — 86900 BLOOD TYPING SEROLOGIC ABO: CPT

## 2022-01-04 PROCEDURE — 80053 COMPREHEN METABOLIC PANEL: CPT | Performed by: OBSTETRICS & GYNECOLOGY

## 2022-01-04 PROCEDURE — 86900 BLOOD TYPING SEROLOGIC ABO: CPT | Performed by: OBSTETRICS & GYNECOLOGY

## 2022-01-04 PROCEDURE — 86901 BLOOD TYPING SEROLOGIC RH(D): CPT | Performed by: OBSTETRICS & GYNECOLOGY

## 2022-01-04 PROCEDURE — S0260 H&P FOR SURGERY: HCPCS | Performed by: OBSTETRICS & GYNECOLOGY

## 2022-01-04 PROCEDURE — 86850 RBC ANTIBODY SCREEN: CPT | Performed by: OBSTETRICS & GYNECOLOGY

## 2022-01-04 PROCEDURE — 85027 COMPLETE CBC AUTOMATED: CPT | Performed by: OBSTETRICS & GYNECOLOGY

## 2022-01-04 RX ORDER — TERBUTALINE SULFATE 1 MG/ML
0.25 INJECTION, SOLUTION SUBCUTANEOUS AS NEEDED
Status: DISCONTINUED | OUTPATIENT
Start: 2022-01-04 | End: 2022-01-06 | Stop reason: HOSPADM

## 2022-01-04 RX ORDER — MISOPROSTOL 100 MCG
25 TABLET ORAL ONCE
Status: COMPLETED | OUTPATIENT
Start: 2022-01-04 | End: 2022-01-04

## 2022-01-04 RX ORDER — ONDANSETRON 2 MG/ML
4 INJECTION INTRAMUSCULAR; INTRAVENOUS EVERY 6 HOURS PRN
Status: DISCONTINUED | OUTPATIENT
Start: 2022-01-04 | End: 2022-01-06 | Stop reason: HOSPADM

## 2022-01-04 RX ORDER — MAGNESIUM CARB/ALUMINUM HYDROX 105-160MG
30 TABLET,CHEWABLE ORAL ONCE
Status: DISCONTINUED | OUTPATIENT
Start: 2022-01-04 | End: 2022-01-06 | Stop reason: HOSPADM

## 2022-01-04 RX ORDER — ACETAMINOPHEN 325 MG/1
650 TABLET ORAL EVERY 4 HOURS PRN
Status: DISCONTINUED | OUTPATIENT
Start: 2022-01-04 | End: 2022-01-06 | Stop reason: HOSPADM

## 2022-01-04 RX ORDER — ONDANSETRON 4 MG/1
4 TABLET, FILM COATED ORAL EVERY 6 HOURS PRN
Status: DISCONTINUED | OUTPATIENT
Start: 2022-01-04 | End: 2022-01-06 | Stop reason: HOSPADM

## 2022-01-04 RX ORDER — SODIUM CHLORIDE, SODIUM LACTATE, POTASSIUM CHLORIDE, CALCIUM CHLORIDE 600; 310; 30; 20 MG/100ML; MG/100ML; MG/100ML; MG/100ML
125 INJECTION, SOLUTION INTRAVENOUS CONTINUOUS
Status: DISCONTINUED | OUTPATIENT
Start: 2022-01-04 | End: 2022-01-06

## 2022-01-04 RX ADMIN — MISOPROSTOL 25 MCG: 100 TABLET ORAL at 09:46

## 2022-01-04 RX ADMIN — MISOPROSTOL 25 MCG: 100 TABLET ORAL at 18:23

## 2022-01-04 RX ADMIN — SODIUM CHLORIDE, POTASSIUM CHLORIDE, SODIUM LACTATE AND CALCIUM CHLORIDE 125 ML/HR: 600; 310; 30; 20 INJECTION, SOLUTION INTRAVENOUS at 10:28

## 2022-01-04 RX ADMIN — MISOPROSTOL 25 MCG: 100 TABLET ORAL at 14:05

## 2022-01-04 RX ADMIN — SODIUM CHLORIDE, POTASSIUM CHLORIDE, SODIUM LACTATE AND CALCIUM CHLORIDE 125 ML/HR: 600; 310; 30; 20 INJECTION, SOLUTION INTRAVENOUS at 13:21

## 2022-01-04 RX ADMIN — SODIUM CHLORIDE, POTASSIUM CHLORIDE, SODIUM LACTATE AND CALCIUM CHLORIDE 125 ML/HR: 600; 310; 30; 20 INJECTION, SOLUTION INTRAVENOUS at 21:00

## 2022-01-04 RX ADMIN — MISOPROSTOL 25 MCG: 100 TABLET ORAL at 22:36

## 2022-01-04 NOTE — PLAN OF CARE
Problem: Adult Inpatient Plan of Care  Goal: Plan of Care Review  Outcome: Ongoing, Progressing  Flowsheets (Taken 1/4/2022 1746)  Progress: improving  Plan of Care Reviewed With: patient  Goal: Patient-Specific Goal (Individualized)  Outcome: Ongoing, Progressing  Goal: Absence of Hospital-Acquired Illness or Injury  Outcome: Ongoing, Progressing  Intervention: Identify and Manage Fall Risk  Recent Flowsheet Documentation  Taken 1/4/2022 1700 by Marilia Taveras RN  Safety Promotion/Fall Prevention:   safety round/check completed   room organization consistent   assistive device/personal items within reach   clutter free environment maintained   fall prevention program maintained  Taken 1/4/2022 1600 by Marilia Taveras RN  Safety Promotion/Fall Prevention: safety round/check completed  Taken 1/4/2022 1500 by Marilia Taveras RN  Safety Promotion/Fall Prevention: safety round/check completed  Taken 1/4/2022 1400 by Marilia Taveras RN  Safety Promotion/Fall Prevention: safety round/check completed  Taken 1/4/2022 1300 by Marilia Taveras RN  Safety Promotion/Fall Prevention: safety round/check completed  Taken 1/4/2022 1200 by Marilia Taveras RN  Safety Promotion/Fall Prevention: safety round/check completed  Taken 1/4/2022 1100 by Marilia Taveras RN  Safety Promotion/Fall Prevention: safety round/check completed  Taken 1/4/2022 1000 by Marilia Taveras RN  Safety Promotion/Fall Prevention: safety round/check completed  Taken 1/4/2022 0900 by Marilia Taveras RN  Safety Promotion/Fall Prevention: safety round/check completed  Taken 1/4/2022 0800 by Marilia Taveras RN  Safety Promotion/Fall Prevention: safety round/check completed  Goal: Optimal Comfort and Wellbeing  Outcome: Ongoing, Progressing  Goal: Readiness for Transition of Care  Outcome: Ongoing, Progressing  Intervention: Mutually Develop Transition Plan  Recent Flowsheet Documentation  Taken 1/4/2022 1031 by Marilia Taveras RN  Equipment Currently  Used at Home: none  Taken 1/4/2022 0752 by Marilia Taveras, RN  Transportation Anticipated: family or friend will provide  Patient/Family Anticipated Services at Transition: none  Patient/Family Anticipates Transition to: home with family     Problem: Bleeding (Labor)  Goal: Hemostasis  Outcome: Ongoing, Progressing     Problem: Change in Fetal Wellbeing (Labor)  Goal: Stable Fetal Wellbeing  Outcome: Ongoing, Progressing     Problem: Delayed Labor Progression (Labor)  Goal: Effective Progression to Delivery  Outcome: Ongoing, Progressing     Problem: Infection (Labor)  Goal: Absence of Infection Signs and Symptoms  Outcome: Ongoing, Progressing     Problem: Labor Pain (Labor)  Goal: Acceptable Pain Control  Outcome: Ongoing, Progressing     Problem: Uterine Tachysystole (Labor)  Goal: Normal Uterine Contraction Pattern  Outcome: Ongoing, Progressing   Goal Outcome Evaluation:  Plan of Care Reviewed With: patient        Progress: improving

## 2022-01-04 NOTE — H&P
[facility] D [facility]  Obstetric History and Physical    Chief Complaint   Patient presents with   • Scheduled Induction       Subjective     Patient is a 26 y.o. female  currently at 38w5d, who presents for induction due to gestational hypertension at term.  She admits to active fetal movement.  She denies bloody show or leaking fluids.    Her prenatal care is complicated by  hypertension  gestational hypertension.  Her previous obstetric/gynecological history is noted for is non-contributory.    The following portions of the patients history were reviewed and updated as appropriate: current medications, allergies, past medical history, past surgical history, past family history, past social history and problem list .       Prenatal Information:  Prenatal Results     POC Urine Glucose/Protein     Test Value Reference Range Date Time    Urine Glucose  Negative mg/dL Negative, 1000 mg/dL (3+) 21 1501    Urine Protein  Negative mg/dL Negative 21 1501          Initial Prenatal Labs     Test Value Reference Range Date Time    Hemoglobin  13.4 g/dL 12.0 - 15.9 22 0908       14.2 g/dL 12.0 - 15.9 21 1726       13.0 g/dL 12.0 - 15.9 21 1613       12.4 g/dL 12.0 - 15.9 21 1421       11.8 g/dL 12.0 - 15.9 21 1425       10.2 g/dL 12.0 - 15.9 21 1638    Hematocrit  38.4 % 34.0 - 46.6 22 0908       41.0 % 34.0 - 46.6 21 1726       36.7 % 34.0 - 46.6 21 1613       35.4 % 34.0 - 46.6 21 1421       33.9 % 34.0 - 46.6 21 1425       30.3 % 34.0 - 46.6 21 1638    Platelets  147 10*3/mm3 140 - 450 22 0908       166 10*3/mm3 140 - 450 21 1726       150 10*3/mm3 140 - 450 21 1613       150 10*3/mm3 140 - 450 21 1421       154 10*3/mm3 140 - 450 21 1425       141 10*3/mm3 140 - 450 21 1638    Rubella IgG ^ immune   21     Hepatitis B SAg ^ Negative   21     Hepatitis C Ab ^ neg   21     RPR ^ Negative    06/14/21     ABO  O   01/04/22 0938    Rh  Positive   01/04/22 0938    Antibody Screen  Negative   01/04/22 0908      ^ Normal  Normal 06/14/21     HIV ^ neg   06/14/21     Urine Culture ^ No growth  No growth at 24 hours, No growth at 2 days, No growth at 3 days, No growth, Growth present, too young to evaluate, Culture in progress 06/14/21     Gonorrhea ^ neg   06/14/21     Chlamydia ^ neg   06/14/21     TSH        HgB A1c               2nd and 3rd Trimester     Test Value Reference Range Date Time    Hemoglobin (repeated)  13.4 g/dL 12.0 - 15.9 01/04/22 0908       14.2 g/dL 12.0 - 15.9 12/30/21 1726       13.0 g/dL 12.0 - 15.9 12/23/21 1613       12.4 g/dL 12.0 - 15.9 12/17/21 1421       11.8 g/dL 12.0 - 15.9 11/19/21 1425       10.2 g/dL 12.0 - 15.9 09/23/21 1638    Hematocrit (repeated)  38.4 % 34.0 - 46.6 01/04/22 0908       41.0 % 34.0 - 46.6 12/30/21 1726       36.7 % 34.0 - 46.6 12/23/21 1613       35.4 % 34.0 - 46.6 12/17/21 1421       33.9 % 34.0 - 46.6 11/19/21 1425       30.3 % 34.0 - 46.6 09/23/21 1638    Platelets   147 10*3/mm3 140 - 450 01/04/22 0908       166 10*3/mm3 140 - 450 12/30/21 1726       150 10*3/mm3 140 - 450 12/23/21 1613       150 10*3/mm3 140 - 450 12/17/21 1421       154 10*3/mm3 140 - 450 11/19/21 1425       141 10*3/mm3 140 - 450 09/23/21 1638    GCT  123 mg/dL 65 - 139 09/23/21 1638    Antibody Screen (repeated)  Negative   01/04/22 0908    GTT Fasting        GTT 1 Hr        GTT 2 Hr        GTT 3 Hr        Group B Strep  No Group B Streptococcus isolated   12/17/21 1511          Drug Screening     Test Value Reference Range Date Time    Amphetamine Screen        Barbiturate Screen        Benzodiazepine Screen        Methadone Screen        Phencyclidine Screen        Opiates Screen        THC Screen        Cocaine Screen        Propoxyphene Screen        Buprenorphine Screen        Methamphetamine Screen        Oxycodone Screen        Tricyclic Antidepressants Screen               Other (Risk screening)     Test Value Reference Range Date Time    Varicella IgG        Parvovirus IgG        CMV IgG        Cystic Fibrosis        Hemoglobin electrophoresis        NIPT        MSAFP-4        AFP (for NTD only)              Legend    ^: Historical                      External Prenatal Results     Pregnancy Outside Results - Transcribed From Office Records - See Scanned Records For Details     Test Value Date Time    ABO  O  01/04/22 0938    Rh  Positive  01/04/22 0938    Antibody Screen  Negative  01/04/22 0908      ^ Normal  06/14/21     Varicella IgG       Rubella ^ immune  06/14/21     Hgb  13.4 g/dL 01/04/22 0908       14.2 g/dL 12/30/21 1726       13.0 g/dL 12/23/21 1613       12.4 g/dL 12/17/21 1421       11.8 g/dL 11/19/21 1425       10.2 g/dL 09/23/21 1638    Hct  38.4 % 01/04/22 0908       41.0 % 12/30/21 1726       36.7 % 12/23/21 1613       35.4 % 12/17/21 1421       33.9 % 11/19/21 1425       30.3 % 09/23/21 1638    Glucose Fasting GTT       Glucose Tolerance Test 1 hour       Glucose Tolerance Test 3 hour       Gonorrhea (discrete) ^ neg  06/14/21     Chlamydia (discrete) ^ neg  06/14/21     RPR ^ Negative  06/14/21     VDRL       Syphilis Antibody       HBsAg ^ Negative  06/14/21     Herpes Simplex Virus PCR       Herpes Simplex VIrus Culture       HIV ^ neg  06/14/21     Hep C RNA Quant PCR       Hep C Antibody ^ neg  06/14/21     AFP       Group B Strep  No Group B Streptococcus isolated  12/17/21 1511    GBS Susceptibility to Clindamycin       GBS Susceptibility to Erythromycin       Fetal Fibronectin       Genetic Testing, Maternal Blood             Drug Screening     Test Value Date Time    Urine Drug Screen       Amphetamine Screen       Barbiturate Screen       Benzodiazepine Screen       Methadone Screen       Phencyclidine Screen       Opiates Screen       THC Screen       Cocaine Screen       Propoxyphene Screen       Buprenorphine Screen       Methamphetamine Screen        Oxycodone Screen       Tricyclic Antidepressants Screen             Legend    ^: Historical                         Past OB History:     OB History    Para Term  AB Living   1 0 0 0 0 0   SAB IAB Ectopic Molar Multiple Live Births   0 0 0 0 0 0      # Outcome Date GA Lbr Amilcar/2nd Weight Sex Delivery Anes PTL Lv   1 Current                Past Medical History: History reviewed. No pertinent past medical history.   Past Surgical History Past Surgical History:   Procedure Laterality Date   • WISDOM TOOTH EXTRACTION        Family History: Family History   Problem Relation Age of Onset   • Hyperlipidemia Mother    • Hypertension Mother    • Diabetes Maternal Grandfather    • Diabetes Paternal Grandmother       Social History:  reports that she has never smoked. She has never used smokeless tobacco.   reports no history of alcohol use.   reports no history of drug use.        Review of Systems   Constitutional: Negative.    Respiratory: Negative.    Cardiovascular: Negative.    Gastrointestinal: Negative.    Endocrine: Negative.    Genitourinary: Negative.    Musculoskeletal: Negative.    Skin: Negative.    Neurological: Negative.    Hematological: Negative.    Psychiatric/Behavioral: Negative.          Objective     Vital Signs Range for the last 24 hours  Temperature: Temp:  [98.4 °F (36.9 °C)-98.7 °F (37.1 °C)] 98.7 °F (37.1 °C)   Temp Source: Temp src: Oral   BP: BP: (125-139)/(84-98) 129/86   Pulse: Heart Rate:  [] 63   Respirations: Resp:  [16-18] 18   SPO2: SpO2:  [98 %-100 %] 98 %   O2 Amount (l/min):     O2 Devices     Weight: Weight:  [73.5 kg (162 lb)] 73.5 kg (162 lb)     Physical Examination: General appearance - alert, well appearing, and in no distress  Mental status - alert, oriented to person, place, and time  Lymphatics - no palpable lymphadenopathy  Chest - clear to auscultation, no wheezes, rales or rhonchi, symmetric air entry  Heart - normal rate, regular rhythm, normal S1, S2, no  murmurs, rubs, clicks or gallops  Abdomen - soft, nontender, nondistended, no masses or organomegaly  Pelvic - closed per rn  Neurological - alert, oriented, normal speech, no focal findings or movement disorder noted  Musculoskeletal - no joint tenderness, deformity or swelling  Extremities - peripheral pulses normal, no pedal edema, no clubbing or cyanosis  Skin - normal coloration and turgor, no rashes, no suspicious skin lesions noted    Presentation: vtx   Cervix: Exam by:     Dilation: Cervical Dilation (cm): 0   Effacement:     Station: Fetal Station: 1-->-2     Fetal Heart Rate Assessment   Method: Fetal HR Assessment Method: external   Beats/min: Fetal HR (beats/min): 145   Baseline: Fetal Heart Baseline Rate: normal range   Variability: Fetal HR Variability: moderate (amplitude range 6 to 25 bpm)   Accels: Fetal HR Accelerations: greater than/equal to 15 bpm, lasting at least 15 seconds   Decels: Fetal HR Decelerations: absent   Tracing Category:       Uterine Assessment   Method: Method: palpation, external tocotransducer   Frequency (min):     Ctx Count in 10 min:     Duration:     Intensity: Contraction Intensity: mild by palpation   Intensity by IUPC:     Resting Tone: Uterine Resting Tone: soft by palpation   Resting Tone by IUPC:     [place] Units:       Laboratory Results:   Radiology Review:   Other Studies:     Assessment/Plan       Gestational hypertension affecting first pregnancy      Assessment & Plan    Assessment:  1.  Intrauterine pregnancy at 38w5d gestation with reactive, reassuring fetal status.    2.  induction of labor  for gestational hypertension  with unfavorable cervix  3.  Obstetrical history significant for is non-contributory.  4.  GBS status:   Group B Strep Culture   Date Value Ref Range Status   12/17/2021 No Group B Streptococcus isolated  Final       Plan:  1. Vaginal anticipated  2. Plan of care has been reviewed with patient and spouse  3.  Risks, benefits of treatment  plan have been discussed.  4.  All questions have been answered.        My Rico, DO  1/4/2022  11:28 EST

## 2022-01-05 ENCOUNTER — ANESTHESIA (OUTPATIENT)
Dept: LABOR AND DELIVERY | Facility: HOSPITAL | Age: 27
End: 2022-01-05

## 2022-01-05 ENCOUNTER — ANESTHESIA EVENT (OUTPATIENT)
Dept: LABOR AND DELIVERY | Facility: HOSPITAL | Age: 27
End: 2022-01-05

## 2022-01-05 PROCEDURE — 10907ZC DRAINAGE OF AMNIOTIC FLUID, THERAPEUTIC FROM PRODUCTS OF CONCEPTION, VIA NATURAL OR ARTIFICIAL OPENING: ICD-10-PCS | Performed by: OBSTETRICS & GYNECOLOGY

## 2022-01-05 PROCEDURE — 10H07YZ INSERTION OF OTHER DEVICE INTO PRODUCTS OF CONCEPTION, VIA NATURAL OR ARTIFICIAL OPENING: ICD-10-PCS | Performed by: OBSTETRICS & GYNECOLOGY

## 2022-01-05 PROCEDURE — 25010000002 FENTANYL CITRATE (PF) 50 MCG/ML SOLUTION: Performed by: STUDENT IN AN ORGANIZED HEALTH CARE EDUCATION/TRAINING PROGRAM

## 2022-01-05 PROCEDURE — C1755 CATHETER, INTRASPINAL: HCPCS | Performed by: ANESTHESIOLOGY

## 2022-01-05 PROCEDURE — 25010000002 ONDANSETRON PER 1 MG: Performed by: OBSTETRICS & GYNECOLOGY

## 2022-01-05 PROCEDURE — 3E033VJ INTRODUCTION OF OTHER HORMONE INTO PERIPHERAL VEIN, PERCUTANEOUS APPROACH: ICD-10-PCS | Performed by: OBSTETRICS & GYNECOLOGY

## 2022-01-05 PROCEDURE — 25010000002 ROPIVACAINE PER 1 MG: Performed by: STUDENT IN AN ORGANIZED HEALTH CARE EDUCATION/TRAINING PROGRAM

## 2022-01-05 PROCEDURE — 25010000002 MORPHINE SULFATE (PF) 5 MG/ML SOLUTION: Performed by: OBSTETRICS & GYNECOLOGY

## 2022-01-05 PROCEDURE — 25010000002 FENTANYL CITRATE (PF) 1000 MCG/20ML SOLUTION: Performed by: STUDENT IN AN ORGANIZED HEALTH CARE EDUCATION/TRAINING PROGRAM

## 2022-01-05 RX ORDER — OXYTOCIN-SODIUM CHLORIDE 0.9% IV SOLN 30 UNIT/500ML 30-0.9/5 UT/ML-%
2 SOLUTION INTRAVENOUS
Status: DISCONTINUED | OUTPATIENT
Start: 2022-01-05 | End: 2022-01-05

## 2022-01-05 RX ORDER — FENTANYL CITRATE 50 UG/ML
INJECTION, SOLUTION INTRAMUSCULAR; INTRAVENOUS
Status: COMPLETED
Start: 2022-01-05 | End: 2022-01-05

## 2022-01-05 RX ORDER — EPHEDRINE SULFATE 50 MG/ML
5 INJECTION, SOLUTION INTRAVENOUS
Status: DISCONTINUED | OUTPATIENT
Start: 2022-01-05 | End: 2022-01-06 | Stop reason: HOSPADM

## 2022-01-05 RX ORDER — LIDOCAINE HYDROCHLORIDE AND EPINEPHRINE 15; 5 MG/ML; UG/ML
INJECTION, SOLUTION EPIDURAL
Status: COMPLETED | OUTPATIENT
Start: 2022-01-05 | End: 2022-01-05

## 2022-01-05 RX ORDER — OXYTOCIN-SODIUM CHLORIDE 0.9% IV SOLN 30 UNIT/500ML 30-0.9/5 UT/ML-%
2 SOLUTION INTRAVENOUS
Status: DISCONTINUED | OUTPATIENT
Start: 2022-01-05 | End: 2022-01-06

## 2022-01-05 RX ORDER — MORPHINE SULFATE 5 MG/ML
4 INJECTION, SOLUTION INTRAMUSCULAR; INTRAVENOUS
Status: DISCONTINUED | OUTPATIENT
Start: 2022-01-05 | End: 2022-01-06

## 2022-01-05 RX ORDER — FENTANYL CITRATE 50 UG/ML
INJECTION, SOLUTION INTRAMUSCULAR; INTRAVENOUS
Status: COMPLETED | OUTPATIENT
Start: 2022-01-05 | End: 2022-01-06

## 2022-01-05 RX ADMIN — ONDANSETRON 4 MG: 2 INJECTION INTRAMUSCULAR; INTRAVENOUS at 12:08

## 2022-01-05 RX ADMIN — SODIUM CHLORIDE, POTASSIUM CHLORIDE, SODIUM LACTATE AND CALCIUM CHLORIDE 1000 ML: 600; 310; 30; 20 INJECTION, SOLUTION INTRAVENOUS at 14:30

## 2022-01-05 RX ADMIN — LIDOCAINE HYDROCHLORIDE AND EPINEPHRINE 3 ML: 15; 5 INJECTION, SOLUTION EPIDURAL at 14:09

## 2022-01-05 RX ADMIN — MORPHINE SULFATE 4 MG: 5 INJECTION, SOLUTION INTRAMUSCULAR; INTRAVENOUS at 12:08

## 2022-01-05 RX ADMIN — OXYTOCIN 2 MILLI-UNITS/MIN: 10 INJECTION, SOLUTION INTRAMUSCULAR; INTRAVENOUS at 02:33

## 2022-01-05 RX ADMIN — FENTANYL CITRATE 100 MCG: 50 INJECTION, SOLUTION INTRAMUSCULAR; INTRAVENOUS at 14:09

## 2022-01-05 RX ADMIN — ROPIVACAINE HYDROCHLORIDE 10 ML/HR: 5 INJECTION, SOLUTION EPIDURAL; INFILTRATION; PERINEURAL at 14:11

## 2022-01-05 NOTE — PROGRESS NOTES
" [facility]  Obstetric Intrapartum Progress Note    Subjective:  Pt is without complaints doing well    Objective:  BP: (111-144)/(64-93) 131/88     Flowsheet Rows      First Filed Value   Admission Height 157.5 cm (62\") Documented at 01/04/2022 0730   Admission Weight 73.5 kg (162 lb) Documented at 01/04/2022 0730          Intake/Output last 24 hours:  No intake or output data in the 24 hours ending 01/05/22 1043    Intake/Output this shift:  No intake/output data recorded.    FHR Tracing:  Baseline: Normal 110-160 bpm  Variability:   Moderate/Normal (amplitude 6-25 bpm)  Accelerations: Present (32 weeks+) 15 x 15 bpm  Decelerations: Absent   Contractions:  Irregular    Physical Exam:  General appearance: alert and in no distress  Cervix: Dilation: 1cm              Effacement: 50%              Station: -2/-3              Presentation: cephalic      Gestational hypertension affecting first pregnancy      Assessment:  Category I  Reassuring fetus, Cervical cath placed 80/80, Adequate progress    Plan:  Continue pitocin  Reviewed course/progress/plan with pt, questions answered to pt satisfaction, pt desires to proceed as outlined.  Pelvis feels clinically adequate  I have discussed with patient and family (if present) the current plan to include, but NLT appropriate expectations, to include possible length of time before delivery/hospital stay.  All questions have been answered to their satisfaction and they desire to proceed as noted.    My Rico, DO 1/5/2022   " dr serrano [id number]

## 2022-01-05 NOTE — PROGRESS NOTES
OB Intrapartum Note    Subjective: Pain NOT controlled    Objective:  Baseline: Normal 110-160 bpm  Variability:   Moderate/Normal (amplitude 6-25 bpm)  Accelerations: Present (32 weeks+) 15 x 15 bpm  Decelerations: None  Contractions:  Regular    Cervical exam:    Dilation: 1cm    Effacement: 50%    Station: -3     Pelvis clinically adequate with vaginal balloon deflated    US mid Dec EFW 6.5lbs    Impression:    Category I  Reassuring fetus, Pain not controlled    Plan:   • Continue pitocin  • Continue to monitor  • Active labor positioning  • I have discussed with patient and family (if present) the current plan to include, but NLT appropriate expectations, to include possible length of time before delivery/hospital stay.  All questions have been answered to their satisfaction and they desire to proceed as noted.  • Discussed at this point still 24-48+hrs, questions answered, no specific labor or delivery desires.         Electronically signed by Jane Olvera DO, 01/05/22, 11:56 AM EST.

## 2022-01-05 NOTE — PROGRESS NOTES
OB Intrapartum Note    Subjective: No complaints, Pain controlled, Comfortable with epidural    Objective:  Baseline: Normal 110-160 bpm  Variability:   Moderate/Normal (amplitude 6-25 bpm)  Accelerations: Present (32 weeks+) 15 x 15 bpm  Decelerations: None  Contractions:  Regular, q2min, q3min    Cervical exam:    Dilation: 2cm, vag balloon deflated, cvx feels very soft and more effaced (difficlut to determine effacement w ut balloon well applied to cvx)    Impression:    Category I  Reassuring fetus, Adequate progress, Pain controlled    Plan:   • Continue pitocin  • Continue to monitor        Electronically signed by Jane Olvera DO, 01/05/22, 4:03 PM EST.     right rail up/right rail down

## 2022-01-05 NOTE — PROGRESS NOTES
OB Intrapartum Note    Subjective: No complaints, Pain controlled, Comfortable with epidural    Objective:  Baseline: Normal 110-160 bpm  Variability:   Minimal (amplitude 1-5 bpm) and + scalp stim w exam  Accelerations: Absent   Decelerations: None  Contractions:  Regular, q2min, q3min    Cervical exam:    Dilation: 5cm    Effacement: 60%    Station: -2    Impression:    Category II  Reassuring fetus, AROM, slightly bloody, otherwise clear fluid    Plan:   • Continue pitocin  • Continue to monitor  • Active labor positioning        Electronically signed by Jane Olvera DO, 01/05/22, 6:47 PM EST.

## 2022-01-05 NOTE — ANESTHESIA PREPROCEDURE EVALUATION
Anesthesia Evaluation     Patient summary reviewed and Nursing notes reviewed   no history of anesthetic complications:  NPO Solid Status: > 2 hours  NPO Liquid Status: > 2 hours           Airway   Mallampati: II  TM distance: >3 FB  No difficulty expected  Dental - normal exam     Pulmonary - negative pulmonary ROS and normal exam   Cardiovascular - normal exam  Exercise tolerance: good (4-7 METS)    (+) hypertension,       Neuro/Psych- negative ROS  GI/Hepatic/Renal/Endo - negative ROS     Musculoskeletal (-) negative ROS    Abdominal  - normal exam   Substance History - negative use     OB/GYN    (+) Pregnant, pregnancy induced hypertension        Other   blood dyscrasia anemia,                     Anesthesia Plan    ASA 2     epidural       Anesthetic plan, all risks, benefits, and alternatives have been provided, discussed and informed consent has been obtained with: patient.

## 2022-01-05 NOTE — NURSING NOTE
Dr Olvera at bedside at 1155 to check patient's butler bulb. Dr Olvera discussed plan of care with patient and discussed pain options. Patient was given zofran and morphine (see MAR). Patient and spouse verbalized understanding.

## 2022-01-05 NOTE — ANESTHESIA PROCEDURE NOTES
Labor Epidural      Patient reassessed immediately prior to procedure    Patient location during procedure: OB  Start Time: 1/5/2022 1:50 PM  Stop Time: 1/5/2022 2:11 PM  Performed By  Anesthesiologist: Fabiola Ovalle DO  Preanesthetic Checklist  Completed: patient identified, IV checked, risks and benefits discussed, surgical consent, monitors and equipment checked, pre-op evaluation and timeout performed  Prep:  Pt Position:sitting  Sterile Tech:cap, gloves, sterile barrier, mask and gown  Prep:chlorhexidine gluconate and isopropyl alcohol  Monitoring:blood pressure monitoring, continuous pulse oximetry and EKG  Epidural Block Procedure:  Approach:midline  Guidance:landmark technique and palpation technique  Needle Type:Tuohy  Needle Gauge:17 G  Loss of Resistance Medium: air  Loss of Resistance: 6cm  Cath Depth at skin:11 cm  Paresthesia: none  Aspiration:negative  Test Dose:negative  Medication: fentaNYL citrate (PF) (SUBLIMAZE) injection, 100 mcg  lidocaine 1.5%-EPINEPHrine 1:200,000 (XYLOCAINE W/EPI) injection, 3 mL  Med administered at 1/5/2022 2:09 PM  Number of Attempts: 3  Post Assessment:  Dressing:occlusive dressing applied and secured with tape  Pt Tolerance:patient tolerated the procedure well with no apparent complications  Complications:no

## 2022-01-06 PROBLEM — O41.1290 ANTEPARTUM CHORIOAMNIONITIS: Status: ACTIVE | Noted: 2022-01-06

## 2022-01-06 LAB
DEPRECATED RDW RBC AUTO: 40.8 FL (ref 37–54)
ERYTHROCYTE [DISTWIDTH] IN BLOOD BY AUTOMATED COUNT: 12.7 % (ref 12.3–15.4)
HCT VFR BLD AUTO: 34.3 % (ref 34–46.6)
HGB BLD-MCNC: 12 G/DL (ref 12–15.9)
MCH RBC QN AUTO: 30.9 PG (ref 26.6–33)
MCHC RBC AUTO-ENTMCNC: 35 G/DL (ref 31.5–35.7)
MCV RBC AUTO: 88.4 FL (ref 79–97)
PLATELET # BLD AUTO: 121 10*3/MM3 (ref 140–450)
PMV BLD AUTO: 11.7 FL (ref 6–12)
RBC # BLD AUTO: 3.88 10*6/MM3 (ref 3.77–5.28)
WBC NRBC COR # BLD: 26.22 10*3/MM3 (ref 3.4–10.8)

## 2022-01-06 PROCEDURE — 25010000002 FENTANYL CITRATE (PF) 50 MCG/ML SOLUTION: Performed by: STUDENT IN AN ORGANIZED HEALTH CARE EDUCATION/TRAINING PROGRAM

## 2022-01-06 PROCEDURE — 25010000002 AMPICILLIN PER 500 MG

## 2022-01-06 PROCEDURE — 25010000002 KETOROLAC TROMETHAMINE PER 15 MG: Performed by: STUDENT IN AN ORGANIZED HEALTH CARE EDUCATION/TRAINING PROGRAM

## 2022-01-06 PROCEDURE — 25010000002 AZITHROMYCIN PER 500 MG: Performed by: OBSTETRICS & GYNECOLOGY

## 2022-01-06 PROCEDURE — 25010000002 AMPICILLIN PER 500 MG: Performed by: OBSTETRICS & GYNECOLOGY

## 2022-01-06 PROCEDURE — 25010000002 ROPIVACAINE PER 1 MG: Performed by: STUDENT IN AN ORGANIZED HEALTH CARE EDUCATION/TRAINING PROGRAM

## 2022-01-06 PROCEDURE — C1765 ADHESION BARRIER: HCPCS | Performed by: OBSTETRICS & GYNECOLOGY

## 2022-01-06 PROCEDURE — 25010000002 GENTAMICIN PER 80 MG: Performed by: OBSTETRICS & GYNECOLOGY

## 2022-01-06 PROCEDURE — 25010000002 PROPOFOL 10 MG/ML EMULSION: Performed by: NURSE ANESTHETIST, CERTIFIED REGISTERED

## 2022-01-06 PROCEDURE — 0 MORPHINE PER 10 MG: Performed by: NURSE ANESTHETIST, CERTIFIED REGISTERED

## 2022-01-06 PROCEDURE — 59515 CESAREAN DELIVERY: CPT | Performed by: OBSTETRICS & GYNECOLOGY

## 2022-01-06 PROCEDURE — 3E0P7VZ INTRODUCTION OF HORMONE INTO FEMALE REPRODUCTIVE, VIA NATURAL OR ARTIFICIAL OPENING: ICD-10-PCS | Performed by: OBSTETRICS & GYNECOLOGY

## 2022-01-06 PROCEDURE — 25010000002 FENTANYL CITRATE (PF) 1000 MCG/20ML SOLUTION: Performed by: STUDENT IN AN ORGANIZED HEALTH CARE EDUCATION/TRAINING PROGRAM

## 2022-01-06 PROCEDURE — 85027 COMPLETE CBC AUTOMATED: CPT | Performed by: OBSTETRICS & GYNECOLOGY

## 2022-01-06 PROCEDURE — 25010000002 MIDAZOLAM PER 1 MG

## 2022-01-06 PROCEDURE — 25010000002 METOCLOPRAMIDE PER 10 MG: Performed by: OBSTETRICS & GYNECOLOGY

## 2022-01-06 DEVICE — ABSORBABLE ADHESION BARRIER
Type: IMPLANTABLE DEVICE | Site: UTERUS | Status: FUNCTIONAL
Brand: GYNECARE INTERCEED

## 2022-01-06 RX ORDER — HYDROCODONE BITARTRATE AND ACETAMINOPHEN 5; 325 MG/1; MG/1
1 TABLET ORAL EVERY 4 HOURS PRN
Status: DISCONTINUED | OUTPATIENT
Start: 2022-01-06 | End: 2022-01-06 | Stop reason: HOSPADM

## 2022-01-06 RX ORDER — SODIUM CHLORIDE, SODIUM LACTATE, POTASSIUM CHLORIDE, CALCIUM CHLORIDE 600; 310; 30; 20 MG/100ML; MG/100ML; MG/100ML; MG/100ML
150 INJECTION, SOLUTION INTRAVENOUS CONTINUOUS
Status: DISCONTINUED | OUTPATIENT
Start: 2022-01-06 | End: 2022-01-09 | Stop reason: HOSPADM

## 2022-01-06 RX ORDER — NIFEDIPINE 10 MG/1
10 CAPSULE ORAL EVERY 4 HOURS PRN
Status: DISCONTINUED | OUTPATIENT
Start: 2022-01-06 | End: 2022-01-09 | Stop reason: HOSPADM

## 2022-01-06 RX ORDER — ONDANSETRON 2 MG/ML
4 INJECTION INTRAMUSCULAR; INTRAVENOUS EVERY 6 HOURS PRN
Status: DISCONTINUED | OUTPATIENT
Start: 2022-01-06 | End: 2022-01-06 | Stop reason: HOSPADM

## 2022-01-06 RX ORDER — ACETAMINOPHEN 325 MG/1
650 TABLET ORAL EVERY 6 HOURS
Status: DISCONTINUED | OUTPATIENT
Start: 2022-01-06 | End: 2022-01-09 | Stop reason: HOSPADM

## 2022-01-06 RX ORDER — BUTORPHANOL TARTRATE 1 MG/ML
2 INJECTION, SOLUTION INTRAMUSCULAR; INTRAVENOUS EVERY 6 HOURS PRN
Status: ACTIVE | OUTPATIENT
Start: 2022-01-06 | End: 2022-01-07

## 2022-01-06 RX ORDER — LIDOCAINE HYDROCHLORIDE 20 MG/ML
INJECTION, SOLUTION INFILTRATION; PERINEURAL AS NEEDED
Status: DISCONTINUED | OUTPATIENT
Start: 2022-01-06 | End: 2022-01-06 | Stop reason: SURG

## 2022-01-06 RX ORDER — PROMETHAZINE HYDROCHLORIDE 25 MG/1
25 SUPPOSITORY RECTAL ONCE AS NEEDED
Status: DISCONTINUED | OUTPATIENT
Start: 2022-01-06 | End: 2022-01-09 | Stop reason: HOSPADM

## 2022-01-06 RX ORDER — ONDANSETRON 4 MG/1
4 TABLET, FILM COATED ORAL EVERY 6 HOURS PRN
Status: DISCONTINUED | OUTPATIENT
Start: 2022-01-06 | End: 2022-01-09 | Stop reason: HOSPADM

## 2022-01-06 RX ORDER — OXYCODONE HYDROCHLORIDE 5 MG/1
5 TABLET ORAL
Status: CANCELLED | OUTPATIENT
Start: 2022-01-06

## 2022-01-06 RX ORDER — MISOPROSTOL 200 UG/1
400 TABLET ORAL ONCE
Status: DISCONTINUED | OUTPATIENT
Start: 2022-01-06 | End: 2022-01-06

## 2022-01-06 RX ORDER — MEPERIDINE HYDROCHLORIDE 25 MG/ML
12.5 INJECTION INTRAMUSCULAR; INTRAVENOUS; SUBCUTANEOUS
Status: CANCELLED | OUTPATIENT
Start: 2022-01-06 | End: 2022-01-07

## 2022-01-06 RX ORDER — CARBOPROST TROMETHAMINE 250 UG/ML
250 INJECTION, SOLUTION INTRAMUSCULAR ONCE
Status: DISCONTINUED | OUTPATIENT
Start: 2022-01-06 | End: 2022-01-07

## 2022-01-06 RX ORDER — HYDROCODONE BITARTRATE AND ACETAMINOPHEN 10; 325 MG/1; MG/1
2 TABLET ORAL EVERY 4 HOURS PRN
Status: DISCONTINUED | OUTPATIENT
Start: 2022-01-06 | End: 2022-01-06 | Stop reason: HOSPADM

## 2022-01-06 RX ORDER — PROMETHAZINE HYDROCHLORIDE 25 MG/1
25 TABLET ORAL ONCE AS NEEDED
Status: DISCONTINUED | OUTPATIENT
Start: 2022-01-06 | End: 2022-01-09 | Stop reason: HOSPADM

## 2022-01-06 RX ORDER — LABETALOL HYDROCHLORIDE 5 MG/ML
INJECTION, SOLUTION INTRAVENOUS AS NEEDED
Status: DISCONTINUED | OUTPATIENT
Start: 2022-01-06 | End: 2022-01-06 | Stop reason: SURG

## 2022-01-06 RX ORDER — BUPIVACAINE HYDROCHLORIDE 2.5 MG/ML
INJECTION, SOLUTION EPIDURAL; INFILTRATION; INTRACAUDAL AS NEEDED
Status: DISCONTINUED | OUTPATIENT
Start: 2022-01-06 | End: 2022-01-06 | Stop reason: SURG

## 2022-01-06 RX ORDER — FAMOTIDINE 20 MG/1
20 TABLET, FILM COATED ORAL ONCE AS NEEDED
Status: DISCONTINUED | OUTPATIENT
Start: 2022-01-06 | End: 2022-01-06 | Stop reason: HOSPADM

## 2022-01-06 RX ORDER — DIPHENHYDRAMINE HYDROCHLORIDE 50 MG/ML
12.5 INJECTION INTRAMUSCULAR; INTRAVENOUS EVERY 6 HOURS PRN
Status: ACTIVE | OUTPATIENT
Start: 2022-01-06 | End: 2022-01-07

## 2022-01-06 RX ORDER — HYDROMORPHONE HCL 110MG/55ML
0.5 PATIENT CONTROLLED ANALGESIA SYRINGE INTRAVENOUS
Status: DISCONTINUED | OUTPATIENT
Start: 2022-01-06 | End: 2022-01-09 | Stop reason: HOSPADM

## 2022-01-06 RX ORDER — ONDANSETRON 4 MG/1
4 TABLET, FILM COATED ORAL EVERY 6 HOURS PRN
Status: DISCONTINUED | OUTPATIENT
Start: 2022-01-06 | End: 2022-01-06 | Stop reason: HOSPADM

## 2022-01-06 RX ORDER — SIMETHICONE 80 MG
80 TABLET,CHEWABLE ORAL 4 TIMES DAILY PRN
Status: DISCONTINUED | OUTPATIENT
Start: 2022-01-06 | End: 2022-01-09 | Stop reason: HOSPADM

## 2022-01-06 RX ORDER — HYDROCODONE BITARTRATE AND ACETAMINOPHEN 5; 325 MG/1; MG/1
1 TABLET ORAL EVERY 6 HOURS PRN
Status: ACTIVE | OUTPATIENT
Start: 2022-01-06 | End: 2022-01-07

## 2022-01-06 RX ORDER — MISOPROSTOL 200 UG/1
TABLET ORAL
Status: DISPENSED
Start: 2022-01-06 | End: 2022-01-06

## 2022-01-06 RX ORDER — HYDROCODONE BITARTRATE AND ACETAMINOPHEN 5; 325 MG/1; MG/1
1 TABLET ORAL EVERY 4 HOURS PRN
Status: DISCONTINUED | OUTPATIENT
Start: 2022-01-07 | End: 2022-01-09 | Stop reason: HOSPADM

## 2022-01-06 RX ORDER — CEFAZOLIN SODIUM 2 G/100ML
2 INJECTION, SOLUTION INTRAVENOUS ONCE AS NEEDED
Status: DISCONTINUED | OUTPATIENT
Start: 2022-01-06 | End: 2022-01-06

## 2022-01-06 RX ORDER — ONDANSETRON 2 MG/ML
4 INJECTION INTRAMUSCULAR; INTRAVENOUS EVERY 6 HOURS PRN
Status: DISCONTINUED | OUTPATIENT
Start: 2022-01-06 | End: 2022-01-09 | Stop reason: HOSPADM

## 2022-01-06 RX ORDER — CLINDAMYCIN PHOSPHATE 900 MG/50ML
900 INJECTION INTRAVENOUS EVERY 8 HOURS
Status: COMPLETED | OUTPATIENT
Start: 2022-01-06 | End: 2022-01-08

## 2022-01-06 RX ORDER — MIDAZOLAM HYDROCHLORIDE 2 MG/2ML
INJECTION, SOLUTION INTRAMUSCULAR; INTRAVENOUS AS NEEDED
Status: DISCONTINUED | OUTPATIENT
Start: 2022-01-06 | End: 2022-01-06 | Stop reason: SURG

## 2022-01-06 RX ORDER — FAMOTIDINE 10 MG/ML
20 INJECTION, SOLUTION INTRAVENOUS ONCE AS NEEDED
Status: COMPLETED | OUTPATIENT
Start: 2022-01-06 | End: 2022-01-06

## 2022-01-06 RX ORDER — KETOROLAC TROMETHAMINE 30 MG/ML
30 INJECTION, SOLUTION INTRAMUSCULAR; INTRAVENOUS EVERY 6 HOURS
Status: COMPLETED | OUTPATIENT
Start: 2022-01-06 | End: 2022-01-07

## 2022-01-06 RX ORDER — MISOPROSTOL 200 UG/1
200 TABLET ORAL ONCE
Status: DISCONTINUED | OUTPATIENT
Start: 2022-01-06 | End: 2022-01-06

## 2022-01-06 RX ORDER — IBUPROFEN 600 MG/1
600 TABLET ORAL EVERY 6 HOURS
Status: DISCONTINUED | OUTPATIENT
Start: 2022-01-06 | End: 2022-01-06 | Stop reason: HOSPADM

## 2022-01-06 RX ORDER — FAMOTIDINE 10 MG/ML
20 INJECTION, SOLUTION INTRAVENOUS ONCE AS NEEDED
Status: DISCONTINUED | OUTPATIENT
Start: 2022-01-06 | End: 2022-01-06 | Stop reason: HOSPADM

## 2022-01-06 RX ORDER — BUPIVACAINE HYDROCHLORIDE 2.5 MG/ML
INJECTION, SOLUTION EPIDURAL; INFILTRATION; INTRACAUDAL
Status: COMPLETED
Start: 2022-01-06 | End: 2022-01-06

## 2022-01-06 RX ORDER — ACETAMINOPHEN 325 MG/1
650 TABLET ORAL EVERY 6 HOURS
Status: DISCONTINUED | OUTPATIENT
Start: 2022-01-06 | End: 2022-01-06

## 2022-01-06 RX ORDER — HYDROCORTISONE 25 MG/G
CREAM TOPICAL 3 TIMES DAILY PRN
Status: DISCONTINUED | OUTPATIENT
Start: 2022-01-06 | End: 2022-01-09 | Stop reason: HOSPADM

## 2022-01-06 RX ORDER — OXYCODONE HYDROCHLORIDE 5 MG/1
5 TABLET ORAL
Status: DISCONTINUED | OUTPATIENT
Start: 2022-01-06 | End: 2022-01-09 | Stop reason: HOSPADM

## 2022-01-06 RX ORDER — PROPOFOL 10 MG/ML
VIAL (ML) INTRAVENOUS AS NEEDED
Status: DISCONTINUED | OUTPATIENT
Start: 2022-01-06 | End: 2022-01-06 | Stop reason: SURG

## 2022-01-06 RX ORDER — DIPHENHYDRAMINE HCL 25 MG
25 CAPSULE ORAL EVERY 6 HOURS PRN
Status: ACTIVE | OUTPATIENT
Start: 2022-01-06 | End: 2022-01-07

## 2022-01-06 RX ORDER — CLINDAMYCIN PHOSPHATE 900 MG/50ML
900 INJECTION INTRAVENOUS ONCE AS NEEDED
Status: COMPLETED | OUTPATIENT
Start: 2022-01-06 | End: 2022-01-06

## 2022-01-06 RX ORDER — HYDROMORPHONE HCL 110MG/55ML
0.25 PATIENT CONTROLLED ANALGESIA SYRINGE INTRAVENOUS
Status: DISCONTINUED | OUTPATIENT
Start: 2022-01-06 | End: 2022-01-09 | Stop reason: HOSPADM

## 2022-01-06 RX ORDER — OXYTOCIN-SODIUM CHLORIDE 0.9% IV SOLN 30 UNIT/500ML 30-0.9/5 UT/ML-%
125 SOLUTION INTRAVENOUS ONCE
Status: DISCONTINUED | OUTPATIENT
Start: 2022-01-06 | End: 2022-01-06 | Stop reason: HOSPADM

## 2022-01-06 RX ORDER — IBUPROFEN 600 MG/1
600 TABLET ORAL EVERY 6 HOURS SCHEDULED
Status: DISCONTINUED | OUTPATIENT
Start: 2022-01-07 | End: 2022-01-09 | Stop reason: HOSPADM

## 2022-01-06 RX ORDER — ACETAMINOPHEN 325 MG/1
650 TABLET ORAL EVERY 6 HOURS
Status: DISCONTINUED | OUTPATIENT
Start: 2022-01-06 | End: 2022-01-06 | Stop reason: HOSPADM

## 2022-01-06 RX ORDER — MORPHINE SULFATE 0.5 MG/ML
INJECTION, SOLUTION EPIDURAL; INTRATHECAL; INTRAVENOUS AS NEEDED
Status: DISCONTINUED | OUTPATIENT
Start: 2022-01-06 | End: 2022-01-06 | Stop reason: SURG

## 2022-01-06 RX ORDER — HYDROCODONE BITARTRATE AND ACETAMINOPHEN 10; 325 MG/1; MG/1
1 TABLET ORAL EVERY 4 HOURS PRN
Status: DISCONTINUED | OUTPATIENT
Start: 2022-01-07 | End: 2022-01-09 | Stop reason: HOSPADM

## 2022-01-06 RX ORDER — KETAMINE HCL IN NACL, ISO-OSM 100MG/10ML
SYRINGE (ML) INJECTION AS NEEDED
Status: DISCONTINUED | OUTPATIENT
Start: 2022-01-06 | End: 2022-01-06 | Stop reason: SURG

## 2022-01-06 RX ORDER — MISOPROSTOL 200 UG/1
800 TABLET ORAL ONCE
Status: DISCONTINUED | OUTPATIENT
Start: 2022-01-06 | End: 2022-01-06

## 2022-01-06 RX ORDER — MISOPROSTOL 100 UG/1
200 TABLET ORAL EVERY 6 HOURS SCHEDULED
Status: DISCONTINUED | OUTPATIENT
Start: 2022-01-06 | End: 2022-01-06 | Stop reason: HOSPADM

## 2022-01-06 RX ORDER — DIPHENOXYLATE HYDROCHLORIDE AND ATROPINE SULFATE 2.5; .025 MG/1; MG/1
1 TABLET ORAL EVERY 6 HOURS PRN
Status: DISCONTINUED | OUTPATIENT
Start: 2022-01-06 | End: 2022-01-09 | Stop reason: HOSPADM

## 2022-01-06 RX ORDER — NALOXONE HCL 0.4 MG/ML
0.4 VIAL (ML) INJECTION ONCE AS NEEDED
Status: ACTIVE | OUTPATIENT
Start: 2022-01-06 | End: 2022-01-07

## 2022-01-06 RX ORDER — SODIUM CHLORIDE, SODIUM LACTATE, POTASSIUM CHLORIDE, CALCIUM CHLORIDE 600; 310; 30; 20 MG/100ML; MG/100ML; MG/100ML; MG/100ML
INJECTION, SOLUTION INTRAVENOUS CONTINUOUS PRN
Status: DISCONTINUED | OUTPATIENT
Start: 2022-01-06 | End: 2022-01-06 | Stop reason: SURG

## 2022-01-06 RX ORDER — IBUPROFEN 600 MG/1
600 TABLET ORAL EVERY 6 HOURS SCHEDULED
Status: DISCONTINUED | OUTPATIENT
Start: 2022-01-07 | End: 2022-01-06

## 2022-01-06 RX ORDER — MEPERIDINE HYDROCHLORIDE 25 MG/ML
12.5 INJECTION INTRAMUSCULAR; INTRAVENOUS; SUBCUTANEOUS
Status: ACTIVE | OUTPATIENT
Start: 2022-01-06 | End: 2022-01-07

## 2022-01-06 RX ORDER — TRANEXAMIC ACID 10 MG/ML
1000 INJECTION, SOLUTION INTRAVENOUS ONCE
Status: DISCONTINUED | OUTPATIENT
Start: 2022-01-06 | End: 2022-01-07

## 2022-01-06 RX ORDER — METOCLOPRAMIDE HYDROCHLORIDE 5 MG/ML
10 INJECTION INTRAMUSCULAR; INTRAVENOUS ONCE AS NEEDED
Status: COMPLETED | OUTPATIENT
Start: 2022-01-06 | End: 2022-01-06

## 2022-01-06 RX ORDER — DOCUSATE SODIUM 100 MG/1
100 CAPSULE, LIQUID FILLED ORAL DAILY
Status: DISCONTINUED | OUTPATIENT
Start: 2022-01-06 | End: 2022-01-09 | Stop reason: HOSPADM

## 2022-01-06 RX ADMIN — MIDAZOLAM HYDROCHLORIDE 1 MG: 2 INJECTION, SOLUTION INTRAMUSCULAR; INTRAVENOUS at 08:55

## 2022-01-06 RX ADMIN — LABETALOL 20 MG/4 ML (5 MG/ML) INTRAVENOUS SYRINGE 5 MG: at 09:12

## 2022-01-06 RX ADMIN — LIDOCAINE HYDROCHLORIDE 2 ML: 20 INJECTION, SOLUTION INFILTRATION; PERINEURAL at 08:37

## 2022-01-06 RX ADMIN — MIDAZOLAM HYDROCHLORIDE 1 MG: 2 INJECTION, SOLUTION INTRAMUSCULAR; INTRAVENOUS at 08:56

## 2022-01-06 RX ADMIN — BUPIVACAINE HYDROCHLORIDE 5 ML: 2.5 INJECTION, SOLUTION EPIDURAL; INFILTRATION; INTRACAUDAL; PERINEURAL at 06:32

## 2022-01-06 RX ADMIN — PROPOFOL 10 MG: 10 INJECTION, EMULSION INTRAVENOUS at 09:15

## 2022-01-06 RX ADMIN — PROPOFOL 30 MG: 10 INJECTION, EMULSION INTRAVENOUS at 09:28

## 2022-01-06 RX ADMIN — FAMOTIDINE 20 MG: 10 INJECTION INTRAVENOUS at 07:54

## 2022-01-06 RX ADMIN — GENTAMICIN SULFATE 300 MG: 40 INJECTION, SOLUTION INTRAMUSCULAR; INTRAVENOUS at 02:53

## 2022-01-06 RX ADMIN — LIDOCAINE HYDROCHLORIDE 5 ML: 20 INJECTION, SOLUTION INFILTRATION; PERINEURAL at 08:03

## 2022-01-06 RX ADMIN — BUPIVACAINE HYDROCHLORIDE 5 ML: 2.5 INJECTION, SOLUTION EPIDURAL; INFILTRATION; INTRACAUDAL; PERINEURAL at 06:26

## 2022-01-06 RX ADMIN — BUPIVACAINE HYDROCHLORIDE 5 ML: 2.5 INJECTION, SOLUTION EPIDURAL; INFILTRATION; INTRACAUDAL; PERINEURAL at 01:04

## 2022-01-06 RX ADMIN — MORPHINE SULFATE 1 MG: 0.5 INJECTION, SOLUTION EPIDURAL; INTRATHECAL; INTRAVENOUS at 09:04

## 2022-01-06 RX ADMIN — MORPHINE SULFATE 1 MG: 0.5 INJECTION, SOLUTION EPIDURAL; INTRATHECAL; INTRAVENOUS at 09:00

## 2022-01-06 RX ADMIN — AMPICILLIN SODIUM 2 G: 2 INJECTION, POWDER, FOR SOLUTION INTRAVENOUS at 18:14

## 2022-01-06 RX ADMIN — KETOROLAC TROMETHAMINE 30 MG: 30 INJECTION, SOLUTION INTRAMUSCULAR; INTRAVENOUS at 23:45

## 2022-01-06 RX ADMIN — SODIUM CHLORIDE, POTASSIUM CHLORIDE, SODIUM LACTATE AND CALCIUM CHLORIDE: 600; 310; 30; 20 INJECTION, SOLUTION INTRAVENOUS at 08:50

## 2022-01-06 RX ADMIN — LIDOCAINE HYDROCHLORIDE 1 ML: 20 INJECTION, SOLUTION INFILTRATION; PERINEURAL at 08:26

## 2022-01-06 RX ADMIN — SODIUM CHLORIDE, POTASSIUM CHLORIDE, SODIUM LACTATE AND CALCIUM CHLORIDE 125 ML/HR: 600; 310; 30; 20 INJECTION, SOLUTION INTRAVENOUS at 04:46

## 2022-01-06 RX ADMIN — Medication 10 MG: at 08:43

## 2022-01-06 RX ADMIN — PROPOFOL 10 MG: 10 INJECTION, EMULSION INTRAVENOUS at 09:21

## 2022-01-06 RX ADMIN — Medication 2 G: at 01:17

## 2022-01-06 RX ADMIN — MORPHINE SULFATE 1 MG: 0.5 INJECTION, SOLUTION EPIDURAL; INTRATHECAL; INTRAVENOUS at 08:56

## 2022-01-06 RX ADMIN — TRANEXAMIC ACID 1000 MG: 100 INJECTION, SOLUTION INTRAVENOUS at 08:50

## 2022-01-06 RX ADMIN — AZITHROMYCIN DIHYDRATE 500 MG: 500 INJECTION, POWDER, LYOPHILIZED, FOR SOLUTION INTRAVENOUS at 08:11

## 2022-01-06 RX ADMIN — ROPIVACAINE HYDROCHLORIDE 10 ML/HR: 5 INJECTION, SOLUTION EPIDURAL; INFILTRATION; PERINEURAL at 00:18

## 2022-01-06 RX ADMIN — PROPOFOL 15 MG: 10 INJECTION, EMULSION INTRAVENOUS at 08:49

## 2022-01-06 RX ADMIN — CLINDAMYCIN PHOSPHATE 900 MG: 900 INJECTION, SOLUTION INTRAVENOUS at 08:11

## 2022-01-06 RX ADMIN — AMPICILLIN SODIUM 2 G: 2 INJECTION, POWDER, FOR SOLUTION INTRAVENOUS at 13:03

## 2022-01-06 RX ADMIN — KETOROLAC TROMETHAMINE 30 MG: 30 INJECTION, SOLUTION INTRAMUSCULAR; INTRAVENOUS at 18:14

## 2022-01-06 RX ADMIN — BUPIVACAINE HYDROCHLORIDE 5 ML: 2.5 INJECTION, SOLUTION EPIDURAL; INFILTRATION; INTRACAUDAL; PERINEURAL at 00:59

## 2022-01-06 RX ADMIN — FENTANYL CITRATE 100 MCG: 50 INJECTION, SOLUTION INTRAMUSCULAR; INTRAVENOUS at 08:42

## 2022-01-06 RX ADMIN — PROPOFOL 10 MG: 10 INJECTION, EMULSION INTRAVENOUS at 09:07

## 2022-01-06 RX ADMIN — LIDOCAINE HYDROCHLORIDE 1 ML: 20 INJECTION, SOLUTION INFILTRATION; PERINEURAL at 08:34

## 2022-01-06 RX ADMIN — ACETAMINOPHEN 650 MG: 325 TABLET ORAL at 23:45

## 2022-01-06 RX ADMIN — MISOPROSTOL 200 MCG: 100 TABLET ORAL at 12:55

## 2022-01-06 RX ADMIN — ROPIVACAINE HYDROCHLORIDE 10 ML/HR: 5 INJECTION, SOLUTION EPIDURAL; INFILTRATION; PERINEURAL at 06:53

## 2022-01-06 RX ADMIN — PROPOFOL 30 MG: 10 INJECTION, EMULSION INTRAVENOUS at 09:30

## 2022-01-06 RX ADMIN — PROPOFOL 10 MG: 10 INJECTION, EMULSION INTRAVENOUS at 08:57

## 2022-01-06 RX ADMIN — OXYTOCIN 0.4 UNITS/MIN: 10 INJECTION, SOLUTION INTRAMUSCULAR; INTRAVENOUS at 09:08

## 2022-01-06 RX ADMIN — PROPOFOL 10 MG: 10 INJECTION, EMULSION INTRAVENOUS at 09:10

## 2022-01-06 RX ADMIN — PROPOFOL 15 MG: 10 INJECTION, EMULSION INTRAVENOUS at 08:53

## 2022-01-06 RX ADMIN — Medication 10 MG: at 08:50

## 2022-01-06 RX ADMIN — ACETAMINOPHEN 650 MG: 325 TABLET ORAL at 18:13

## 2022-01-06 RX ADMIN — METOCLOPRAMIDE HYDROCHLORIDE 10 MG: 5 INJECTION INTRAMUSCULAR; INTRAVENOUS at 07:54

## 2022-01-06 RX ADMIN — OXYTOCIN 0.7 UNITS/MIN: 10 INJECTION, SOLUTION INTRAMUSCULAR; INTRAVENOUS at 08:39

## 2022-01-06 RX ADMIN — ACETAMINOPHEN 650 MG: 325 TABLET ORAL at 01:07

## 2022-01-06 RX ADMIN — Medication 10 MG: at 08:45

## 2022-01-06 RX ADMIN — CLINDAMYCIN IN 5 PERCENT DEXTROSE 900 MG: 18 INJECTION, SOLUTION INTRAVENOUS at 21:32

## 2022-01-06 RX ADMIN — SODIUM CHLORIDE, POTASSIUM CHLORIDE, SODIUM LACTATE AND CALCIUM CHLORIDE: 600; 310; 30; 20 INJECTION, SOLUTION INTRAVENOUS at 09:09

## 2022-01-06 RX ADMIN — PROPOFOL 30 MG: 10 INJECTION, EMULSION INTRAVENOUS at 09:29

## 2022-01-06 RX ADMIN — LABETALOL 20 MG/4 ML (5 MG/ML) INTRAVENOUS SYRINGE 10 MG: at 09:20

## 2022-01-06 RX ADMIN — LIDOCAINE HYDROCHLORIDE 2 ML: 20 INJECTION, SOLUTION INFILTRATION; PERINEURAL at 08:30

## 2022-01-06 RX ADMIN — SODIUM CHLORIDE, POTASSIUM CHLORIDE, SODIUM LACTATE AND CALCIUM CHLORIDE 150 ML/HR: 600; 310; 30; 20 INJECTION, SOLUTION INTRAVENOUS at 18:08

## 2022-01-06 RX ADMIN — ACETAMINOPHEN 650 MG: 325 TABLET ORAL at 12:55

## 2022-01-06 RX ADMIN — PROPOFOL 10 MG: 10 INJECTION, EMULSION INTRAVENOUS at 09:00

## 2022-01-06 RX ADMIN — LIDOCAINE HYDROCHLORIDE 4 ML: 20 INJECTION, SOLUTION INFILTRATION; PERINEURAL at 08:07

## 2022-01-06 RX ADMIN — PROPOFOL 10 MG: 10 INJECTION, EMULSION INTRAVENOUS at 09:02

## 2022-01-06 RX ADMIN — CLINDAMYCIN IN 5 PERCENT DEXTROSE 900 MG: 18 INJECTION, SOLUTION INTRAVENOUS at 13:04

## 2022-01-06 NOTE — PLAN OF CARE
Problem: Bleeding ( Delivery)  Goal: Bleeding is Controlled  Outcome: Met     Problem: Change in Fetal Wellbeing ( Delivery)  Goal: Stable Fetal Wellbeing  Outcome: Met     Problem: Infection ( Delivery)  Goal: Absence of Infection Signs and Symptoms  Outcome: Met     Problem: Respiratory Compromise ( Delivery)  Goal: Effective Oxygenation and Ventilation  Outcome: Met   Goal Outcome Evaluation:                 Problem: Bleeding (Labor)  Goal: Hemostasis  2022 1546 by Enma Inman RN  Outcome: Unable to Meet, Plan Revised  2022 1545 by Enma Inman RN  Outcome: Ongoing, Not Progressing     Problem: Change in Fetal Wellbeing (Labor)  Goal: Stable Fetal Wellbeing  2022 1546 by Enma Inman RN  Outcome: Unable to Meet, Plan Revised  2022 1545 by Enma Inman RN  Outcome: Ongoing, Not Progressing     Problem: Delayed Labor Progression (Labor)  Goal: Effective Progression to Delivery  2022 1546 by Enma Inman RN  Outcome: Unable to Meet, Plan Revised  2022 1545 by Enma Inman RN  Outcome: Ongoing, Not Progressing     Problem: Infection (Labor)  Goal: Absence of Infection Signs and Symptoms  2022 1546 by Enma Inman RN  Outcome: Unable to Meet, Plan Revised  2022 1545 by Enma Inman RN  Outcome: Ongoing, Not Progressing     Problem: Labor Pain (Labor)  Goal: Acceptable Pain Control  2022 1546 by Enma Inman RN  Outcome: Unable to Meet, Plan Revised  2022 1545 by Enma Inman RN  Outcome: Ongoing, Not Progressing     Problem: Uterine Tachysystole (Labor)  Goal: Normal Uterine Contraction Pattern  2022 1546 by Enma Inman RN  Outcome: Unable to Meet, Plan Revised  2022 1545 by Enma Inman RN  Outcome: Ongoing, Not Progressing

## 2022-01-06 NOTE — PROGRESS NOTES
OB Intrapartum Note    Subjective: Pain NOT controlled    Objective:  Baseline: Normal 110-160 bpm  Variability:   Moderate/Normal (amplitude 6-25 bpm)  Accelerations: Present (32 weeks+) 15 x 15 bpm  Decelerations: None  Contractions:  Regular, q2min    Cervical exam:    Dilation: AL    Effacement: 100%    Station: +2    Impression:    Category I  Reassuring fetus, Adequate progress, Pain not controlled    Plan:   • Continue pitocin  • Continue to monitor  • Anesthesia contacted to assist w pain control        Electronically signed by Jane Olvera DO, 01/06/22, 6:09 AM EST.

## 2022-01-06 NOTE — PROGRESS NOTES
Called for epidural bolus. Pt c/o pain.  Good relief after last rebolus.  9.5cm dilated    Bolus 0.25% Bup 5cc + 5 cc

## 2022-01-06 NOTE — PLAN OF CARE
Problem: Bleeding (Labor)  Goal: Hemostasis  1/6/2022 1546 by Enma Inman RN  Outcome: Unable to Meet, Plan Revised  1/6/2022 1545 by Enma Inman RN  Outcome: Ongoing, Not Progressing     Problem: Change in Fetal Wellbeing (Labor)  Goal: Stable Fetal Wellbeing  1/6/2022 1546 by Enma Inman RN  Outcome: Unable to Meet, Plan Revised  1/6/2022 1545 by Enma Inman RN  Outcome: Ongoing, Not Progressing     Problem: Delayed Labor Progression (Labor)  Goal: Effective Progression to Delivery  1/6/2022 1546 by Enma Inman RN  Outcome: Unable to Meet, Plan Revised  1/6/2022 1545 by Enma Inman RN  Outcome: Ongoing, Not Progressing     Problem: Infection (Labor)  Goal: Absence of Infection Signs and Symptoms  1/6/2022 1546 by Enma Inman RN  Outcome: Unable to Meet, Plan Revised  1/6/2022 1545 by Enma Inman RN  Outcome: Ongoing, Not Progressing     Problem: Labor Pain (Labor)  Goal: Acceptable Pain Control  1/6/2022 1546 by Enma Inman RN  Outcome: Unable to Meet, Plan Revised  1/6/2022 1545 by Enma Inman RN  Outcome: Ongoing, Not Progressing     Problem: Uterine Tachysystole (Labor)  Goal: Normal Uterine Contraction Pattern  1/6/2022 1546 by Enma Inman RN  Outcome: Unable to Meet, Plan Revised  1/6/2022 1545 by Enma Inman RN  Outcome: Ongoing, Not Progressing   Goal Outcome Evaluation:                 Problem: Bleeding (Labor)  Goal: Hemostasis  1/6/2022 1546 by Enma Inman RN  Outcome: Unable to Meet, Plan Revised  1/6/2022 1545 by Enma Inman RN  Outcome: Ongoing, Not Progressing     Problem: Change in Fetal Wellbeing (Labor)  Goal: Stable Fetal Wellbeing  1/6/2022 1546 by Enma Inman RN  Outcome: Unable to Meet, Plan Revised  1/6/2022 1545 by Enma Inman RN  Outcome: Ongoing, Not Progressing     Problem: Delayed Labor Progression (Labor)  Goal: Effective Progression to Delivery  1/6/2022 1546 by Enma Inman, RN  Outcome: Unable to  Meet, Plan Revised  1/6/2022 1545 by Enma Inman RN  Outcome: Ongoing, Not Progressing     Problem: Infection (Labor)  Goal: Absence of Infection Signs and Symptoms  1/6/2022 1546 by Enma Inman RN  Outcome: Unable to Meet, Plan Revised  1/6/2022 1545 by Enma Inman RN  Outcome: Ongoing, Not Progressing     Problem: Labor Pain (Labor)  Goal: Acceptable Pain Control  1/6/2022 1546 by Enma Inman RN  Outcome: Unable to Meet, Plan Revised  1/6/2022 1545 by Enma Inman RN  Outcome: Ongoing, Not Progressing     Problem: Uterine Tachysystole (Labor)  Goal: Normal Uterine Contraction Pattern  1/6/2022 1546 by Enma Inman RN  Outcome: Unable to Meet, Plan Revised  1/6/2022 1545 by Enma Inman RN  Outcome: Ongoing, Not Progressing

## 2022-01-06 NOTE — OP NOTE
OB Operative Note        Date of Service:  22     Pre-Operative Dx:   IUP at Gestational Age: 39w0d  weeks    indication: Arrest of dilation  Chorioamnionitis    Postoperative dx:   ALESIA  Deep transverse arrest  Prominent sacral promontory  Uterine atony    Procedure: Primary LTCS and B akins stitch    Surgeon/Assistant: Dr. Jane Olvera,     Anesthesia:  Epidural    Anesthesiologist:  Anesthesiologist: Nathaniel Ferrara MD; Vic Martinez MD; Fabiola Ovalle DO  CRNA: María Elena Whitlock CRNA    EBL: 1200 Mls    Findings: Normal uterus, Normal tubes, Normal ovaries, Normal placenta, centrally inserting cord and Uterine atony: Pitocin concentration 40 units in 1 L of LR, Cytotec 200 p.o. and 200 sublingual, TXA given.  Hemabate given.  Persistent atony.  B. Akins stitch placed.  At the end of the procedure lochia appropriate.  400 mcg of Cytotec placed rectally.    Infant: Gender:  female  infant    Weight:   3630 g (8 lb)     APGARS:  7  @ 1 minute / 9  @ 5 minutes    Specimens:  Cord blood, cord gases     Procedure Details:  The patient was brought to the operating room and epidural anesthesia was deemed to be adequate.  She was prepped and draped in a normal sterile fashion and placed in supine position with a leftward tilt.  A vaginal prep was performed.  A Pfannenstiel skin incision was made approximately 2 fingerbreadths above the symphysis pubis and carried down to the underlying layer of fascia.  The fascia was nicked in the midline and extended laterally with Durbin scissors.  The superior and inferior aspects of the fascial incision were grasped with Kocher clamps and the underlying rectus muscle was dissected off bluntly.  The midline was identified and opened in a sharp fashion with no noted damage to underlying bowel, bladder, blood vessels, or organs.  The vesicouterine peritoneum was incised and the bladder flap was created.  The lower uterine segment was incised in a transverse  fashion and extended laterally with bandage scissors.  Fluid was noted to be clear.  The fetal vertex was brought up atraumatically through the uterine incision.  The mouth and nares were bulb suctioned.  The right anterior and left posterior shoulders were delivered easily.  The remainder of the body delivered.  The infant was vigorous.  The cord was clamped and cut after a delay of 60 seconds and the infant was handed off to [facility] baby care.  A segment of cord was handed off to the technician for collection of cord blood and cord gases.  The placenta delivered with the assistance of fundal massage and was discarded.  The uterus was exteriorized and cleared of all clots and debris.  The uterine incision was repaired with 0 Monocryl in a running fashion.  A second imbricating stitch was placed.  Excellent hemostasis was assured.  Persistent uterine atony despite uterotonic's.  B. Akins stitch placed with chromic.     The uterus was placed back into the pelvic cavity.  Copious irrigation was performed.  The gutters were cleared of all clot and debris.  The uterine incision was reinspected off tension and noted to be hemostatic.  Interceed was placed over the uterine incision and anterior uterus.  The parietal peritoneum was closed with Monocryl.  The rectus muscles were reapproximated in the midline.  The rectus muscles and fascia were noted to be hemostatic.  The fascia was closed with 0 PDS in a running fashion starting at the bilateral angles and to the midline.  The subcutaneous tissue was copiously irrigated and made hemostatic.  It was reapproximated using monocryl and the skin was closed with staples.  Urine was blood-tinged at the beginning of the procedure as it had been the last couple hours of labor.  It was clear at the end of the procedure.     The patient tolerated the procedure well.  Instrument, lap, and needle counts were correct.  The patient received antibiotics prior to the procedure.  Patient  was receiving ampicillin and gentamicin intrapartum for chorioamnionitis.  She was given clindamycin and azithromycin for preoperative prophylaxis.  This will be continued postoperatively.  She was taken to the recovery room in stable condition.      Complications: None    Condition: Good    Disposition:  PACU          Electronically signed by:  Jane Olvera DO, 01/06/22, 9:44 AM EST.

## 2022-01-06 NOTE — NURSING NOTE
Dr. Garcia notified that pt temp is 101.5 and heart rate 118, pt does have chorio and ABX is ordered for patient; ampicillin q 6hrs, clindamycin q 8hrs with tylenol q6hrs.  MD states to continue with the ABX as ordered

## 2022-01-06 NOTE — PROGRESS NOTES
Called for increasing pressure.  Ms Silva states uncomfortable with contraction, feels pressure in her back and pelvis.    VSS  Epidural bolus (0.25% bup 5+ 5cc) with relief  Increased infusion

## 2022-01-06 NOTE — NURSING NOTE
In OR meds given per verbal orders from Dr. Olvera;   200mcg Cytotec SL@0843   200mcg Cytotec orally @0843  Hemabate given IM at 0849   TXA given by anesthesia at approximately 0845

## 2022-01-06 NOTE — PROGRESS NOTES
OB Intrapartum Note    Subjective: No complaints, Pain controlled, Feeling some more pressure    Objective:  Baseline: Normal 110-160 bpm  Variability:   Moderate/Normal (amplitude 6-25 bpm)  Accelerations: Present (32 weeks+) 15 x 15 bpm  Decelerations: None  Contractions:  Regular, q2min, q3min, MVUs >180, since about 2230    Cervical exam:    Dilation: 5-6cm    Effacement: 80%    Station: -2    Impression:    Category I  Reassuring fetus, Lack of adequate progress    Plan:   • Continue pitocin  • Continue to monitor  • DW family and pt plan to cont to keep adequate MVUs and recheck 2hrs, questions answered, they desire to proceed.          Electronically signed by Jane Olvera DO, 01/05/22, 11:04 PM EST.

## 2022-01-06 NOTE — PROGRESS NOTES
OB Intrapartum Note    Subjective: Pain NOT controlled    Objective:  Baseline: Normal 110-160 bpm  Variability:   Moderate/Normal (amplitude 6-25 bpm)  Accelerations: Present (32 weeks+) 15 x 15 bpm  Decelerations: None  Contractions:  Regular, q2min, Pit at 26    Cervical exam:    Dilation: 9cm, cvx starting to swell    Effacement: 100%, swollen    Station: +2, significant molding, OT position.  Tried to push past cvx, unable.    Impression:    Category I  Reassuring fetus, Lack of adequate progress, Pain not controlled, Arrest of dilation, suspect CPD    Plan:   •   • Pt has been 9cm since 5am, pit at 26, exam now with significant molding and position OT.  Pt has had active managment w position changes w nursing frequently to help w fetal rotation.     • Pt and  agree to CS  • The patient was counseled on the risks, benefits and alternatives of a .  Risks reviewed, but are not limited to: anesthesia, bleeding, transfusion, infection, damage to organs, reoperation, wound separation, blood clots, and death.  She declines the alternatives and desires a .  All her questions have been answered to her satisfaction and she desires to proceed.          Electronically signed by Jane Olvera DO, 22, 7:46 AM EST.

## 2022-01-06 NOTE — NURSING NOTE
@ 0730 Dr. Olvera at [facility] to check pt, SVE is 9cm +2, pt is feeling a lot of pressure at this time and is in pain, MD discuss POC and the need for c section due to arrest of dilation and CPD, MD discuss risks vs benefits with patient and family at bedside, pt and family agree to go back for a csection at this time.  @ 0735 MD states to stop pitocin and prep for a primary csection.  Anesthesia called for csection at 0737 and pharmacy called for pre op ABX at 0738.

## 2022-01-06 NOTE — PROGRESS NOTES
OB Intrapartum Note    Subjective: No complaints, Pain controlled, Comfortable with epidural    Objective:  Baseline: Normal 110-160 bpm  Variability:   Moderate/Normal (amplitude 6-25 bpm)  Accelerations: Present (32 weeks+) 15 x 15 bpm  Decelerations: None  Contractions:  Regular, q2min    Cervical exam:    Dilation: 5cm    Effacement: 70%    Station: -2    Impression:    Category I  Reassuring fetus, Lack of adequate progress, IUPC placed    Plan:   • Continue pitocin  • Continue to monitor  • Active labor positioning        Electronically signed by Jane Olvera DO, 01/05/22, 9:02 PM EST.

## 2022-01-06 NOTE — DISCHARGE SUMMARY
OB Discharge Summary        Admit Date:  2022  Date of Delivery: 2022   Discharge Date: 22      Reason for Admission: IOL GHTN    Final Diagnosis:  IOL 2022 38 5/7 Dr. Jacky DARNELL  Susana Price  Chorioamnionitis Amp Gent Clinda  Arrest of dilation  PCS 22 39+0  Breast  To do at FU: BP check    Antepartum:  Prenatal care is complicated by:  GHTN    Intrapartum/Delivery:  OB Surgeon:  Dr. Jane Olvera, DO  Anesthesia: Epidural  Delivery Type:   Perineum: Intact  Feeding method: Breast    Infant: female  infant; Susana Price    Weight: 3630 g (8 lb)      APGARS: 7  @ 1 minute / 9  @ 5 minutes    Hospital Course/Significant Findings:  Patient admitted by Dr. SMITH on  for IOL secondary to GHTN.  3 doses of Cytotec, then cervical catheter and Pitocin on .  Patient had slow progression and progressed to 9 cm.  Low-grade temperature and fetal tachycardia, patient diagnosed with chorioamnionitis and amp and gent started.  After 3 hours of being 9 cm, Pitocin at 26, significant molding and cervical swelling noted, fetus in OT position.  Arrest of dilation, suspected CPD.  Amp, gent, clindamycin continued postoperatively.  DOD temp 101.5 triples continued.  After treatment with triple antibiotics, patient's fever resolved and her white count continued to decrease.  She continued to clinically improve and was deemed stable for discharge on postoperative day #3.    Discharge:         Discharge Medications      New Medications      Instructions Start Date   acetaminophen 325 MG tablet  Commonly known as: Tylenol   650 mg, Oral, Every 6 Hours PRN      ibuprofen 800 MG tablet  Commonly known as: ADVIL,MOTRIN   800 mg, Oral, Every 8 Hours PRN      oxyCODONE 5 MG immediate release tablet  Commonly known as: Roxicodone   5 mg, Oral, Every 6 Hours PRN         Changes to Medications      Instructions Start Date   ferrous sulfate 325 (65 FE) MG tablet  What changed: Another medication with  the same name was added. Make sure you understand how and when to take each.   325 mg, Oral, Every Other Day      ferrous sulfate 325 (65 FE) MG tablet  What changed: You were already taking a medication with the same name, and this prescription was added. Make sure you understand how and when to take each.   325 mg, Oral, Every Other Day         Continue These Medications      Instructions Start Date   prenatal vitamin 27-0.8 27-0.8 MG tablet tablet   1 tablet, Oral, Daily               Disposition: Home  Diet: Regular    Pelvic Rest: 6 weeks    Condition at discharge: Good    Follow up with: Jane Olvera MD or provider of her choice    Follow up in: 1 week BP check, And incision check    Complications: None      Signature: Keenan Coto MD        [facility]  [address] [place] [address] [address]  [facility] [id number]  Dept: [phone]  Loc: [phone]

## 2022-01-06 NOTE — PROGRESS NOTES
OB Intrapartum Note    Subjective: Pain NOT controlled    Objective:  Baseline: Elevated baseline >160 bpm  Variability:   Moderate/Normal (amplitude 6-25 bpm)  Accelerations: Present (32 weeks+) 15 x 15 bpm  Decelerations: None  Contractions:  Regular, q2min    Cervical exam:    Dilation: 6cm    Effacement: 100%    Station: 0/Engaged    Impression:    Category II  Reassuring fetus, suspect now just entering active phase, chorioamnionitis    Plan:   • Continue pitocin  • Continue to monitor  • Epidural redose  • Start amp and gent  • DW pt and family change in cervical exam is dramatic with complete effacement and lower station.  I feel she is just entering active phase.  Pelvis feels adequate.  Will start abx.  DW family dx of chorio and plan.  I will recheck 2hrs.  Dr. Osei at bedside redosing epidural now.         Electronically signed by Jane Olvera DO, 01/06/22, 12:55 AM EST.

## 2022-01-06 NOTE — PLAN OF CARE
Problem: Bleeding (Labor)  Goal: Hemostasis  Outcome: Ongoing, Not Progressing     Problem: Change in Fetal Wellbeing (Labor)  Goal: Stable Fetal Wellbeing  Outcome: Ongoing, Not Progressing     Problem: Delayed Labor Progression (Labor)  Goal: Effective Progression to Delivery  Outcome: Ongoing, Not Progressing     Problem: Infection (Labor)  Goal: Absence of Infection Signs and Symptoms  Outcome: Ongoing, Not Progressing     Problem: Labor Pain (Labor)  Goal: Acceptable Pain Control  Outcome: Ongoing, Not Progressing     Problem: Uterine Tachysystole (Labor)  Goal: Normal Uterine Contraction Pattern  Outcome: Ongoing, Not Progressing   Goal Outcome Evaluation:

## 2022-01-06 NOTE — DISCHARGE INSTRUCTIONS
DR. ORTEGA'S POSTOP  DISCHARGE PRECAUTIONS and Answers to FAQs     NO SEX, tampons, or douching for SIX weeks.     NO DRIVING for TWO weeks. or while taking narcotic pain medications.     NO TUB BATH or POOL for FOUR weeks, shower only.       NO LIFTING more than 20 pounds for 2 week(s).     STAPLES (if present):  follow up at the office in the next 3-7 days to have them removed if they were not removed before discharge.  If your staples were removed already and steri-strips placed (or you just had steri-strips) REMOVE YOUR STERI STRIPS in TEN DAYS.      INCISION CARE:   WASH DAILY in the shower with soap and water (any type of soap is fine, it does not need to be antibacterial soap).  Look (or have a family member/friend look) at your incision EVERY DAY when you get home.  Keeping the incision DRY is extremely important.  Continue to keep a clean, dry wash cloth (to help wick away moisture) on your incision for 10 days.  Change out the wash cloth frequently (approximately every 2-8 hrs as needed to prevent it from getting moist).  REDNESS, PUS, increase in PAIN, FEVER or CHILLS are all reasons to be seen in our office immediately.  Go to the ER, if it is after hours or a weekend.         VAGINAL BLEEDING:  may continue on and off over the next several weeks after delivery and may increase slightly once you go home.  You should not be bleeding more than 1 large pad soaked every hour or two.  Clots (even the size of a lemon or larger) may be normal as long as the bleeding is not heavy and the clots do not continue.       FEVER or CHILLS or NOT FEELING WELL: call our office.  If the office is closed, you need to be seen in acute care or ER.       CHEST PAIN or SHORTNESS OF BREATH/AIR: you need to GO TO THE NEAREST ER or CALL 911.      SWELLING:  can increase over the next 7-10 days and then should slowly improve.  Your legs/ankles should be fairly similar in size.  A red, painful, hot, swollen leg (usually  just one side) can be a sign of a blood clot and should be evaluated immediately.  Call our office.  If it is after hours or a weekend, you must be seen IMMEDIATELY IN THE ER.      ELEVATED BLOOD PRESSURE:  you need to contact us if you are having  persistent elevated BP systolic (top number) more than 155 or diastolic (bottom number) more than 95, or a headache (not relieved with rest, hydration or over the counter pain reliever), an increase in your swelling (usually hands and face), changes in your vision (typically flashing white or black spots) or severe persistent pain in the location of the upper right side of your belly (under your right breast).  Call our office or go to ER if after hours or a weekend.     LACTATION QUESTIONS or CONCERNS?  Call [facility] Lactation support [phone].     WORK and SCHOOL TIME OFF: depends on your specific delivery type, surrounding circumstances, and your work insurance/school rules.  If you have questions, please call Mel or Leigh at [phone] (ext. [phone]).  Or email Mel at [email or web address].  They will assist in required paperwork for you and/or family members.      For further QUESTIONS or CONCERNS, please call [facility] ANDREA Mina at [phone].

## 2022-01-07 LAB
ALBUMIN SERPL-MCNC: 2.5 G/DL (ref 3.5–5.2)
ALBUMIN/GLOB SERPL: 1.1 G/DL
ALP SERPL-CCNC: 135 U/L (ref 39–117)
ALT SERPL W P-5'-P-CCNC: 18 U/L (ref 1–33)
ANION GAP SERPL CALCULATED.3IONS-SCNC: 4.4 MMOL/L (ref 5–15)
AST SERPL-CCNC: 33 U/L (ref 1–32)
BASOPHILS # BLD AUTO: 0.04 10*3/MM3 (ref 0–0.2)
BASOPHILS NFR BLD AUTO: 0.2 % (ref 0–1.5)
BILIRUB SERPL-MCNC: 0.2 MG/DL (ref 0–1.2)
BUN SERPL-MCNC: 14 MG/DL (ref 6–20)
BUN/CREAT SERPL: 17.9 (ref 7–25)
CALCIUM SPEC-SCNC: 8.3 MG/DL (ref 8.6–10.5)
CHLORIDE SERPL-SCNC: 105 MMOL/L (ref 98–107)
CO2 SERPL-SCNC: 25.6 MMOL/L (ref 22–29)
CREAT SERPL-MCNC: 0.78 MG/DL (ref 0.57–1)
DEPRECATED RDW RBC AUTO: 41 FL (ref 37–54)
EOSINOPHIL # BLD AUTO: 0.2 10*3/MM3 (ref 0–0.4)
EOSINOPHIL NFR BLD AUTO: 1.1 % (ref 0.3–6.2)
ERYTHROCYTE [DISTWIDTH] IN BLOOD BY AUTOMATED COUNT: 12.7 % (ref 12.3–15.4)
GFR SERPL CREATININE-BSD FRML MDRD: 107 ML/MIN/1.73
GFR SERPL CREATININE-BSD FRML MDRD: 88 ML/MIN/1.73
GLOBULIN UR ELPH-MCNC: 2.3 GM/DL
GLUCOSE SERPL-MCNC: 107 MG/DL (ref 65–99)
HCT VFR BLD AUTO: 30.3 % (ref 34–46.6)
HGB BLD-MCNC: 10.5 G/DL (ref 12–15.9)
IMM GRANULOCYTES # BLD AUTO: 0.11 10*3/MM3 (ref 0–0.05)
IMM GRANULOCYTES NFR BLD AUTO: 0.6 % (ref 0–0.5)
LYMPHOCYTES # BLD AUTO: 1.8 10*3/MM3 (ref 0.7–3.1)
LYMPHOCYTES NFR BLD AUTO: 10 % (ref 19.6–45.3)
MCH RBC QN AUTO: 30.6 PG (ref 26.6–33)
MCHC RBC AUTO-ENTMCNC: 34.7 G/DL (ref 31.5–35.7)
MCV RBC AUTO: 88.3 FL (ref 79–97)
MONOCYTES # BLD AUTO: 1.37 10*3/MM3 (ref 0.1–0.9)
MONOCYTES NFR BLD AUTO: 7.6 % (ref 5–12)
NEUTROPHILS NFR BLD AUTO: 14.47 10*3/MM3 (ref 1.7–7)
NEUTROPHILS NFR BLD AUTO: 80.5 % (ref 42.7–76)
NRBC BLD AUTO-RTO: 0 /100 WBC (ref 0–0.2)
PLATELET # BLD AUTO: 102 10*3/MM3 (ref 140–450)
PMV BLD AUTO: 11.3 FL (ref 6–12)
POTASSIUM SERPL-SCNC: 4.1 MMOL/L (ref 3.5–5.2)
PROT SERPL-MCNC: 4.8 G/DL (ref 6–8.5)
RBC # BLD AUTO: 3.43 10*6/MM3 (ref 3.77–5.28)
SODIUM SERPL-SCNC: 135 MMOL/L (ref 136–145)
WBC NRBC COR # BLD: 17.99 10*3/MM3 (ref 3.4–10.8)

## 2022-01-07 PROCEDURE — 25010000002 KETOROLAC TROMETHAMINE PER 15 MG: Performed by: STUDENT IN AN ORGANIZED HEALTH CARE EDUCATION/TRAINING PROGRAM

## 2022-01-07 PROCEDURE — 25010000002 AMPICILLIN PER 500 MG

## 2022-01-07 PROCEDURE — 85025 COMPLETE CBC W/AUTO DIFF WBC: CPT | Performed by: OBSTETRICS & GYNECOLOGY

## 2022-01-07 PROCEDURE — 25010000002 GENTAMICIN PER 80 MG: Performed by: OBSTETRICS & GYNECOLOGY

## 2022-01-07 PROCEDURE — 80053 COMPREHEN METABOLIC PANEL: CPT | Performed by: OBSTETRICS & GYNECOLOGY

## 2022-01-07 PROCEDURE — 0503F POSTPARTUM CARE VISIT: CPT | Performed by: OBSTETRICS & GYNECOLOGY

## 2022-01-07 PROCEDURE — 25010000002 AMPICILLIN PER 500 MG: Performed by: OBSTETRICS & GYNECOLOGY

## 2022-01-07 RX ADMIN — KETOROLAC TROMETHAMINE 30 MG: 30 INJECTION, SOLUTION INTRAMUSCULAR; INTRAVENOUS at 05:57

## 2022-01-07 RX ADMIN — IBUPROFEN 600 MG: 600 TABLET ORAL at 17:38

## 2022-01-07 RX ADMIN — CLINDAMYCIN IN 5 PERCENT DEXTROSE 900 MG: 18 INJECTION, SOLUTION INTRAVENOUS at 22:20

## 2022-01-07 RX ADMIN — CLINDAMYCIN IN 5 PERCENT DEXTROSE 900 MG: 18 INJECTION, SOLUTION INTRAVENOUS at 14:25

## 2022-01-07 RX ADMIN — ACETAMINOPHEN 650 MG: 325 TABLET ORAL at 11:26

## 2022-01-07 RX ADMIN — DOCUSATE SODIUM 100 MG: 100 CAPSULE, LIQUID FILLED ORAL at 09:14

## 2022-01-07 RX ADMIN — GENTAMICIN SULFATE 300 MG: 40 INJECTION, SOLUTION INTRAMUSCULAR; INTRAVENOUS at 11:26

## 2022-01-07 RX ADMIN — AMPICILLIN SODIUM 2 G: 2 INJECTION, POWDER, FOR SOLUTION INTRAVENOUS at 01:37

## 2022-01-07 RX ADMIN — AMPICILLIN SODIUM 2 G: 2 INJECTION, POWDER, FOR SOLUTION INTRAVENOUS at 07:32

## 2022-01-07 RX ADMIN — KETOROLAC TROMETHAMINE 30 MG: 30 INJECTION, SOLUTION INTRAMUSCULAR; INTRAVENOUS at 11:25

## 2022-01-07 RX ADMIN — ACETAMINOPHEN 650 MG: 325 TABLET ORAL at 05:57

## 2022-01-07 RX ADMIN — AMPICILLIN SODIUM 2 G: 2 INJECTION, POWDER, FOR SOLUTION INTRAVENOUS at 19:55

## 2022-01-07 RX ADMIN — ACETAMINOPHEN 650 MG: 325 TABLET ORAL at 17:39

## 2022-01-07 RX ADMIN — AMPICILLIN SODIUM 2 G: 2 INJECTION, POWDER, FOR SOLUTION INTRAVENOUS at 13:43

## 2022-01-07 RX ADMIN — SODIUM CHLORIDE, POTASSIUM CHLORIDE, SODIUM LACTATE AND CALCIUM CHLORIDE 150 ML/HR: 600; 310; 30; 20 INJECTION, SOLUTION INTRAVENOUS at 05:58

## 2022-01-07 RX ADMIN — CLINDAMYCIN IN 5 PERCENT DEXTROSE 900 MG: 18 INJECTION, SOLUTION INTRAVENOUS at 05:58

## 2022-01-07 NOTE — LACTATION NOTE
LC in to follow up with breastfeeding progress. Patient is in good spirits today and eager to breastfeed and care for infant. LC assisted with two breastfeeding sessions. She is somewhat awkward at holding infant but became better with encouragement. Infant was often sleepy at the breast and LC is not sure if this is because of some jaundice noted or breast flow refusal. Infant showed better progress at this afternoon feeding. Patient is becoming more independent with infant's feeding.LC instructed her on syringe feeding supplements and she showed good understanding. LC discussed with mom about  normal  breastfeeding behaviors and breastfeeding expectations for the next 2 days and to call as needed for lactation assistance . Mom showed good understanding.

## 2022-01-07 NOTE — PROGRESS NOTES
PostPartum/PostOp PROGRESS NOTE        Subjective:  • Patient has no complaints  • Pain controlled  • Tolerating a regular diet  • Passing flatus  • Ambulating  • Urinating spontaneously  • Lochia decreasing, no bleeding concerns  • Denies HA, vision change, or RUQ/epigastric pain  • No lightheadedness or dizziness  • TEMP YESTERDAY 101.5, ON AMP GENT CLINDA      Objective:     Vitals: reviewed  General- NAD, alert and oriented, appropriate  Psych- Normal mood, good memory  CV/Resp- CV- Regular rhythm, no murnurs and Resp- CTA to bases, no wheezes  Abdomen- Fundus firm, non tender, Soft, non distended, non tender, normal active bowel sounds, Bandage intact and Fundus at U-1  Ext/DTRs- +1 edema, bilaterally equal  CBC    CBC 1/4/22 1/6/22 1/7/22   WBC 8.48 26.22 (A) 17.99 (A)   RBC 4.42 3.88 3.43 (A)   Hemoglobin 13.4 12.0 10.5 (A)   Hematocrit 38.4 34.3 30.3 (A)   MCV 86.9 88.4 88.3   MCH 30.3 30.9 30.6   MCHC 34.9 35.0 34.7   RDW 12.6 12.7 12.7   Platelets 147 121 (A) 102 (A)   (A) Abnormal value            CMP    CMP 12/30/21 1/4/22 1/7/22   Glucose 91 76 107 (A)   BUN 19 10 14   Creatinine 0.69 0.69 0.78   eGFR Non  Am 101 101 88   eGFR  Am 123 123 107   Sodium 136 136 135 (A)   Potassium 3.9 4.5 4.1   Chloride 103 103 105   Calcium 9.5 9.3 8.3 (A)   Albumin 3.90 3.50 2.50 (A)   Total Bilirubin 0.3 0.2 0.2   Alkaline Phosphatase 201 (A) 206 (A) 135 (A)   AST (SGOT) 21 23 33 (A)   ALT (SGPT) 14 14 18   (A) Abnormal value              Assessment:    Post-partum/postop Day:  1  CHORIO, TEMP YESTERDAY ON TRIPLES  Anemia c/w EBL, asymptomatic w assoc thrombocytopenia    Plan:   • Routine postpartum/postop care  • Saline lock IV  • Remove bandage  • Shower  • Importance of wound care/keep clean and dry  • Breast feeding support  • DC meds reviewed  • Follow up scheduled  • PP/PO precautions given  • HTN precautions reviewed in detail.  Questions answered.  RTO/ER for HA not relieved w tylenol, vision  changes, epig/RUQ pain, or Bps elevated at home.  • CONT ABX UNTIL 48HR AFEBRILE  • Suspect home Sunday              Electronically signed by Jane Olvera DO, 01/07/22, 8:26 AM EST.

## 2022-01-07 NOTE — PROGRESS NOTES
"Pharmacy to Dose Aminoglycoside: Gentamicin Day 2  Consulting provider: May  Clinical indication: Chorio  Duration: 1/10/22      157.5 cm (62\")       01/04/22  0930      Weight: 73.5 kg (162 lb)         Adjusted body weight: 59.5 kg    Estimated Creatinine Clearance: 100.9 mL/min (by C-G formula based on SCr of 0.78 mg/dL).     Other antimicrobial therapy: Ampicillin 2g IV q6h    Microbiology Results (last 10 days)       Procedure  Component  Value  -  Date/Time    COVID PRE-OP / PRE-PROCEDURE SCREENING ORDER (NO ISOLATION) - Swab, Nasopharynx [[id number]]  (Normal)  Collected: 12/30/21 1616    Lab Status: Final result  Specimen: Swab from Nasopharynx  Updated: 12/30/21 2345    Narrative:      The following orders were created for panel order COVID PRE-OP / PRE-PROCEDURE SCREENING ORDER (NO ISOLATION) - Swab, Nasopharynx.  Procedure                               Abnormality         Status                     ---------                               -----------         ------                     COVID-19,APTIMA PANTHER(...[[id number]]  Normal              Final result                 Please view results for these tests on the individual orders.    COVID-19,APTIMA PANTHER(MOHAN),[facility], NP/OP SWAB IN UTM/VTM/SALINE TRANSPORT MEDIA,24 HR TAT - Swab, Nasopharynx [[id number]]  (Normal)  Collected: 12/30/21 1616    Lab Status: Final result  Specimen: Swab from Nasopharynx  Updated: 12/30/21 2345      COVID19  Not Detected    Narrative:      Fact sheet for providers: https://www.fda.gov/media/112231/download     Fact sheet for patients: https://www.fda.gov/media/913463/download    Test performed by RT PCR.           Assessment/Plan:  Patient meets established criteria for extended interval aminoglycoside dosing per protocol (CrCl > 20ml/min, negative for exclusion criteria).  Gentamicin dosed at 300 mg (5 mg/kg) IV every 24 hours.    Gentamicin trough level prior to 3rd dose at steady state, goal trough less than 0.05 " mcg/ml.    Date and time level due: 1/8 @1000  Labs ordered: Gentamicin trough

## 2022-01-07 NOTE — ANESTHESIA POSTPROCEDURE EVALUATION
Patient: Anna Silva    Procedure Summary     Date: 22 Room / Location: [facility] LABOR DELIVERY  [facility] LABOR [facility]IV[facility]    Anesthesia Start: 1350 Anesthesia Stop: 22    Procedure:  SECTION PRIMARY (N/A Abdomen) Diagnosis: (FAILURE TO PROGRESS)    Surgeons: Jane Olvera DO Provider: Nathaniel Ferrara MD    Anesthesia Type: regional ASA Status: 2          Anesthesia Type: regional    Vitals  Vitals Value Taken Time   /80 22 0915   Temp 36.4 °C (97.6 °F) 22 0915   Pulse 76 22 0915   Resp 20 22 0915   SpO2 99 % 22 1230           Post Anesthesia Care and Evaluation    Patient location during evaluation: bedside  Patient participation: complete - patient participated  Level of consciousness: awake  Pain management: adequate  Airway patency: patent  Anesthetic complications: No anesthetic complications  PONV Status: none  Cardiovascular status: acceptable and stable  Respiratory status: acceptable and room air  Hydration status: acceptable  Post Neuraxial Block status: Motor and sensory function returned to baseline and No signs or symptoms of PDPH

## 2022-01-08 LAB
ALBUMIN SERPL-MCNC: 2.9 G/DL (ref 3.5–5.2)
ALBUMIN/GLOB SERPL: 1.2 G/DL
ALP SERPL-CCNC: 130 U/L (ref 39–117)
ALT SERPL W P-5'-P-CCNC: 17 U/L (ref 1–33)
ANION GAP SERPL CALCULATED.3IONS-SCNC: 8 MMOL/L (ref 5–15)
AST SERPL-CCNC: 26 U/L (ref 1–32)
BILIRUB SERPL-MCNC: 0.2 MG/DL (ref 0–1.2)
BUN SERPL-MCNC: 7 MG/DL (ref 6–20)
BUN/CREAT SERPL: 10.6 (ref 7–25)
CALCIUM SPEC-SCNC: 8.8 MG/DL (ref 8.6–10.5)
CHLORIDE SERPL-SCNC: 105 MMOL/L (ref 98–107)
CO2 SERPL-SCNC: 25 MMOL/L (ref 22–29)
CREAT SERPL-MCNC: 0.66 MG/DL (ref 0.57–1)
DEPRECATED RDW RBC AUTO: 40.8 FL (ref 37–54)
EOSINOPHIL # BLD MANUAL: 0.54 10*3/MM3 (ref 0–0.4)
EOSINOPHIL NFR BLD MANUAL: 4 % (ref 0.3–6.2)
ERYTHROCYTE [DISTWIDTH] IN BLOOD BY AUTOMATED COUNT: 12.9 % (ref 12.3–15.4)
GENTAMICIN TROUGH SERPL-MCNC: 0.49 MCG/ML (ref 0.5–2)
GFR SERPL CREATININE-BSD FRML MDRD: 107 ML/MIN/1.73
GFR SERPL CREATININE-BSD FRML MDRD: 129 ML/MIN/1.73
GLOBULIN UR ELPH-MCNC: 2.4 GM/DL
GLUCOSE SERPL-MCNC: 98 MG/DL (ref 65–99)
HCT VFR BLD AUTO: 30.3 % (ref 34–46.6)
HGB BLD-MCNC: 10.6 G/DL (ref 12–15.9)
LYMPHOCYTES # BLD MANUAL: 1.9 10*3/MM3 (ref 0.7–3.1)
LYMPHOCYTES NFR BLD MANUAL: 1 % (ref 5–12)
MCH RBC QN AUTO: 30.5 PG (ref 26.6–33)
MCHC RBC AUTO-ENTMCNC: 35 G/DL (ref 31.5–35.7)
MCV RBC AUTO: 87.3 FL (ref 79–97)
MONOCYTES # BLD: 0.14 10*3/MM3 (ref 0.1–0.9)
NEUTROPHILS # BLD AUTO: 10.98 10*3/MM3 (ref 1.7–7)
NEUTROPHILS NFR BLD MANUAL: 81 % (ref 42.7–76)
PLATELET # BLD AUTO: 120 10*3/MM3 (ref 140–450)
PMV BLD AUTO: 11.1 FL (ref 6–12)
POTASSIUM SERPL-SCNC: 4.2 MMOL/L (ref 3.5–5.2)
PROT SERPL-MCNC: 5.3 G/DL (ref 6–8.5)
RBC # BLD AUTO: 3.47 10*6/MM3 (ref 3.77–5.28)
RBC MORPH BLD: NORMAL
SMALL PLATELETS BLD QL SMEAR: ABNORMAL
SODIUM SERPL-SCNC: 138 MMOL/L (ref 136–145)
VARIANT LYMPHS NFR BLD MANUAL: 14 % (ref 19.6–45.3)
WBC MORPH BLD: NORMAL
WBC NRBC COR # BLD: 13.56 10*3/MM3 (ref 3.4–10.8)

## 2022-01-08 PROCEDURE — 0503F POSTPARTUM CARE VISIT: CPT | Performed by: STUDENT IN AN ORGANIZED HEALTH CARE EDUCATION/TRAINING PROGRAM

## 2022-01-08 PROCEDURE — 80053 COMPREHEN METABOLIC PANEL: CPT | Performed by: OBSTETRICS & GYNECOLOGY

## 2022-01-08 PROCEDURE — 25010000002 AMPICILLIN PER 500 MG: Performed by: STUDENT IN AN ORGANIZED HEALTH CARE EDUCATION/TRAINING PROGRAM

## 2022-01-08 PROCEDURE — 25010000002 GENTAMICIN PER 80 MG: Performed by: STUDENT IN AN ORGANIZED HEALTH CARE EDUCATION/TRAINING PROGRAM

## 2022-01-08 PROCEDURE — 85007 BL SMEAR W/DIFF WBC COUNT: CPT | Performed by: OBSTETRICS & GYNECOLOGY

## 2022-01-08 PROCEDURE — 80170 ASSAY OF GENTAMICIN: CPT | Performed by: OBSTETRICS & GYNECOLOGY

## 2022-01-08 PROCEDURE — 85027 COMPLETE CBC AUTOMATED: CPT | Performed by: OBSTETRICS & GYNECOLOGY

## 2022-01-08 PROCEDURE — 25010000002 AMPICILLIN PER 500 MG: Performed by: OBSTETRICS & GYNECOLOGY

## 2022-01-08 RX ADMIN — DOCUSATE SODIUM 100 MG: 100 CAPSULE, LIQUID FILLED ORAL at 08:02

## 2022-01-08 RX ADMIN — IBUPROFEN 600 MG: 600 TABLET ORAL at 12:40

## 2022-01-08 RX ADMIN — IBUPROFEN 600 MG: 600 TABLET ORAL at 06:01

## 2022-01-08 RX ADMIN — IBUPROFEN 600 MG: 600 TABLET ORAL at 17:16

## 2022-01-08 RX ADMIN — ACETAMINOPHEN 650 MG: 325 TABLET ORAL at 12:43

## 2022-01-08 RX ADMIN — GENTAMICIN SULFATE 300 MG: 40 INJECTION, SOLUTION INTRAMUSCULAR; INTRAVENOUS at 11:15

## 2022-01-08 RX ADMIN — ACETAMINOPHEN 650 MG: 325 TABLET ORAL at 00:00

## 2022-01-08 RX ADMIN — CLINDAMYCIN IN 5 PERCENT DEXTROSE 900 MG: 18 INJECTION, SOLUTION INTRAVENOUS at 06:01

## 2022-01-08 RX ADMIN — AMPICILLIN SODIUM 2 G: 2 INJECTION, POWDER, FOR SOLUTION INTRAVENOUS at 02:03

## 2022-01-08 RX ADMIN — CLINDAMYCIN IN 5 PERCENT DEXTROSE 900 MG: 18 INJECTION, SOLUTION INTRAVENOUS at 13:17

## 2022-01-08 RX ADMIN — AMPICILLIN SODIUM 2 G: 2 INJECTION, POWDER, FOR SOLUTION INTRAVENOUS at 12:40

## 2022-01-08 RX ADMIN — IBUPROFEN 600 MG: 600 TABLET ORAL at 00:00

## 2022-01-08 RX ADMIN — ACETAMINOPHEN 650 MG: 325 TABLET ORAL at 06:01

## 2022-01-08 RX ADMIN — ACETAMINOPHEN 650 MG: 325 TABLET ORAL at 17:16

## 2022-01-08 RX ADMIN — AMPICILLIN SODIUM 2 G: 2 INJECTION, POWDER, FOR SOLUTION INTRAVENOUS at 07:55

## 2022-01-08 NOTE — LACTATION NOTE
Bedside RN states Pt has not  since yesterday. LC in to speak with Pt. Pt states she hasnt been feeling well and has been exhausted so had let dad and nursing staff formula feed baby. Baby is taking up to 2oz of formula each feeding. Just finished a bottle when LC arrived. Discussed starting pumping with pt once home if breastpump has arrived. During LC visit baby still appeared hungry rooting and mouthing hands. Encouraged pt to latch baby on, latched to left breast in cradle position, baby latched and suckled well, swallows noted. Encouraged pt to latch when she is feeding better to help with stimulation for milk supply. Encouraged her to call out for LC assistance as needed.

## 2022-01-08 NOTE — PLAN OF CARE
Problem: Breastfeeding  Goal: Effective Breastfeeding  Outcome: Ongoing, Progressing  Intervention: Promote Effective Breastfeeding  Recent Flowsheet Documentation  Taken 2022 2100 by Nguyen Smith RN  Parent/Child Attachment Promotion:   face-to-face positioning promoted   interaction encouraged   parent/caregiver presence encouraged   participation in care promoted  Intervention: Support Exclusive Breastfeeding Success  Recent Flowsheet Documentation  Taken 2022 2100 by Nguyen Smith RN  Supportive Measures:   active listening utilized   decision-making supported   self-care encouraged     Problem: Adjustment to Role Transition (Postpartum  Delivery)  Goal: Successful Maternal Role Transition  Outcome: Ongoing, Progressing  Intervention: Support Maternal Role Transition  Recent Flowsheet Documentation  Taken 2022 2100 by Nguyen Smith RN  Supportive Measures:   active listening utilized   decision-making supported   self-care encouraged  Parent/Child Attachment Promotion:   face-to-face positioning promoted   interaction encouraged   parent/caregiver presence encouraged   participation in care promoted     Problem: Bleeding (Postpartum  Delivery)  Goal: Hemostasis  Outcome: Ongoing, Progressing     Problem: Infection (Postpartum  Delivery)  Goal: Absence of Infection Signs and Symptoms  Outcome: Ongoing, Progressing     Problem: Pain (Postpartum  Delivery)  Goal: Acceptable Pain Control  Outcome: Ongoing, Progressing  Intervention: Prevent or Manage Pain  Recent Flowsheet Documentation  Taken 2022 2100 by Nguyen Smith RN  Pain Management Interventions:   see MAR   quiet environment facilitated     Problem: Postoperative Nausea and Vomiting (Postpartum  Delivery)  Goal: Nausea and Vomiting Relief  Outcome: Ongoing, Progressing     Problem: Postoperative Urinary Retention (Postpartum  Delivery)  Goal: Effective Urinary  Elimination  Outcome: Ongoing, Progressing  Intervention: Monitor and Manage Urinary Retention  Recent Flowsheet Documentation  Taken 1/7/2022 2100 by Nguyen Smith RN  Urinary Elimination Promotion: frequent voiding encouraged   Goal Outcome Evaluation:

## 2022-01-08 NOTE — PROGRESS NOTES
"Pharmacy to Dose Aminoglycoside: Gentamicin Day 3  Consulting provider: May  Clinical indication: Chorio, IAI  Duration: 1/10/22      157.5 cm (62\")       01/04/22  0930      Weight: 73.5 kg (162 lb)         Adjusted body weight: 59.5 kg    Estimated Creatinine Clearance: 119.2 mL/min (by C-G formula based on SCr of 0.66 mg/dL).     Other antimicrobial therapy: Ampicillin 2g IV q6h    Microbiology Results (last 10 days)       Procedure Component Value - Date/Time    COVID PRE-OP / PRE-PROCEDURE SCREENING ORDER (NO ISOLATION) - Swab, Nasopharynx [[id number]]  (Normal) Collected: 12/30/21 1616    Lab Status: Final result Specimen: Swab from Nasopharynx Updated: 12/30/21 2345    Narrative:      The following orders were created for panel order COVID PRE-OP / PRE-PROCEDURE SCREENING ORDER (NO ISOLATION) - Swab, Nasopharynx.  Procedure                               Abnormality         Status                     ---------                               -----------         ------                     COVID-19,APTIMA PANTHER(...[[id number]]  Normal              Final result                 Please view results for these tests on the individual orders.    COVID-19,APTIMA PANTHER(MOHAN),[facility], NP/OP SWAB IN UTM/VTM/SALINE TRANSPORT MEDIA,24 HR TAT - Swab, Nasopharynx [[id number]]  (Normal) Collected: 12/30/21 1616    Lab Status: Final result Specimen: Swab from Nasopharynx Updated: 12/30/21 2345     COVID19 Not Detected    Narrative:      Fact sheet for providers: https://www.fda.gov/media/177756/download     Fact sheet for patients: https://www.fda.gov/media/539116/download    Test performed by RT PCR.            Assessment/Plan:  Lab Results   Component Value Date    GENTTROUGH 0.49 (L) 01/08/2022     Goal <0.5. Will continue to dose at this time. Per MD, can DC abx once afebrile x48 hours. Last documented fever 101.5 1/6 @ 1434.     Date and time level due: 1/8 @1000  Labs ordered: Gentamicin trough      "

## 2022-01-08 NOTE — PROGRESS NOTES
PostPartum/PostOp PROGRESS NOTE        Subjective:  • Patient has complaint of sore on tongue  • Pain controlled  • Tolerating a regular diet  • Passing flatus  • Ambulating  • Urinating spontaneously  • Lochia decreasing, no bleeding concerns  • Denies HA, vision change, or RUQ/epigastric pain  • No lightheadedness or dizziness  • Afebrile overnight      Objective:     Vitals: reviewed  General- NAD, alert and oriented, appropriate  Psych- Normal mood, good memory  CV/Resp- CV- Regular rhythm, no murnurs and Resp- CTA to bases, no wheezes  Abdomen- Fundus firm, non tender, Soft, non distended, non tender, normal active bowel sounds, Bandage intact and Fundus at U-1. Incision C/D/I staples in place.   Ext/DTRs- +1 edema, bilaterally equal  CBC    CBC 1/6/22 1/7/22 1/8/22   WBC 26.22 (A) 17.99 (A) 13.56 (A)   RBC 3.88 3.43 (A) 3.47 (A)   Hemoglobin 12.0 10.5 (A) 10.6 (A)   Hematocrit 34.3 30.3 (A) 30.3 (A)   MCV 88.4 88.3 87.3   MCH 30.9 30.6 30.5   MCHC 35.0 34.7 35.0   RDW 12.7 12.7 12.9   Platelets 121 (A) 102 (A) 120 (A)   (A) Abnormal value            CMP    CMP 1/4/22 1/7/22 1/8/22   Glucose 76 107 (A) 98   BUN 10 14 7   Creatinine 0.69 0.78 0.66   eGFR Non  Am 101 88 107   eGFR  Am 123 107 129   Sodium 136 135 (A) 138   Potassium 4.5 4.1 4.2   Chloride 103 105 105   Calcium 9.3 8.3 (A) 8.8   Albumin 3.50 2.50 (A) 2.90 (A)   Total Bilirubin 0.2 0.2 0.2   Alkaline Phosphatase 206 (A) 135 (A) 130 (A)   AST (SGOT) 23 33 (A) 26   ALT (SGPT) 14 18 17   (A) Abnormal value              Assessment:    Post-partum/postop Day:  2  Afebrile day #1  Anemia c/w EBL, asymptomatic w assoc thrombocytopenia    Plan:   • Routine postpartum/postop care  • Saline lock IV  • Remove bandage  • Shower  • Importance of wound care/keep clean and dry  • Breast feeding support  • DC meds reviewed  • Follow up scheduled  • PP/PO precautions given  • HTN precautions reviewed in detail.  Questions answered.  RTO/ER for HA not  relieved w tylenol, vision changes, epig/RUQ pain, or Bps elevated at home.  • CONT ABX UNTIL 48HR AFEBRILE, stop time 1430 today   • Oragel for tongue sore  • Abdominal binder for comfort  • Suspect home Sunday          Keenan Coto MD

## 2022-01-09 VITALS
OXYGEN SATURATION: 99 % | BODY MASS INDEX: 29.81 KG/M2 | DIASTOLIC BLOOD PRESSURE: 85 MMHG | HEIGHT: 62 IN | HEART RATE: 79 BPM | WEIGHT: 162 LBS | TEMPERATURE: 98.2 F | SYSTOLIC BLOOD PRESSURE: 124 MMHG | RESPIRATION RATE: 18 BRPM

## 2022-01-09 PROBLEM — R03.0 ELEVATED BP WITHOUT DIAGNOSIS OF HYPERTENSION: Status: RESOLVED | Noted: 2021-11-19 | Resolved: 2022-01-09

## 2022-01-09 PROBLEM — O13.3 GESTATIONAL HYPERTENSION, THIRD TRIMESTER: Status: RESOLVED | Noted: 2022-01-01 | Resolved: 2022-01-09

## 2022-01-09 PROCEDURE — 0503F POSTPARTUM CARE VISIT: CPT | Performed by: STUDENT IN AN ORGANIZED HEALTH CARE EDUCATION/TRAINING PROGRAM

## 2022-01-09 RX ORDER — OXYCODONE HYDROCHLORIDE 5 MG/1
5 TABLET ORAL EVERY 6 HOURS PRN
Qty: 12 TABLET | Refills: 0 | Status: SHIPPED | OUTPATIENT
Start: 2022-01-09 | End: 2022-01-12

## 2022-01-09 RX ORDER — FERROUS SULFATE 325(65) MG
325 TABLET ORAL EVERY OTHER DAY
Qty: 30 TABLET | Refills: 1 | Status: SHIPPED | OUTPATIENT
Start: 2022-01-09

## 2022-01-09 RX ORDER — ACETAMINOPHEN 325 MG/1
650 TABLET ORAL EVERY 6 HOURS PRN
Qty: 30 TABLET | Refills: 1 | Status: SHIPPED | OUTPATIENT
Start: 2022-01-09 | End: 2022-01-19

## 2022-01-09 RX ORDER — IBUPROFEN 800 MG/1
800 TABLET ORAL EVERY 8 HOURS PRN
Qty: 30 TABLET | Refills: 1 | Status: SHIPPED | OUTPATIENT
Start: 2022-01-09 | End: 2022-01-19

## 2022-01-09 RX ADMIN — IBUPROFEN 600 MG: 600 TABLET ORAL at 05:19

## 2022-01-09 RX ADMIN — IBUPROFEN 600 MG: 600 TABLET ORAL at 00:10

## 2022-01-09 RX ADMIN — DOCUSATE SODIUM 100 MG: 100 CAPSULE, LIQUID FILLED ORAL at 08:45

## 2022-01-09 RX ADMIN — ACETAMINOPHEN 650 MG: 325 TABLET ORAL at 00:14

## 2022-01-09 RX ADMIN — ACETAMINOPHEN 650 MG: 325 TABLET ORAL at 05:19

## 2022-01-09 NOTE — PROGRESS NOTES
PostPartum/PostOp PROGRESS NOTE        Subjective:  • Patient has no complaints  • Pain controlled  • Tolerating a regular diet  • Passing flatus  • Ambulating  • Urinating spontaneously  • Lochia decreasing, no bleeding concerns  • Denies HA, vision change, or RUQ/epigastric pain  • No lightheadedness or dizziness  • Afebrile overnight      Objective:     Vitals: reviewed  General- NAD, alert and oriented, appropriate  Psych- Normal mood, good memory  CV/Resp- CV- Regular rhythm, no murnurs and Resp- CTA to bases, no wheezes  Abdomen- Fundus firm, non tender, Soft, non distended, non tender, normal active bowel sounds, Bandage intact and Fundus at U-1.  Incision covered with Steri-Strips  Ext/DTRs- +1 edema, bilaterally equal  CBC    CBC 1/6/22 1/7/22 1/8/22   WBC 26.22 (A) 17.99 (A) 13.56 (A)   RBC 3.88 3.43 (A) 3.47 (A)   Hemoglobin 12.0 10.5 (A) 10.6 (A)   Hematocrit 34.3 30.3 (A) 30.3 (A)   MCV 88.4 88.3 87.3   MCH 30.9 30.6 30.5   MCHC 35.0 34.7 35.0   RDW 12.7 12.7 12.9   Platelets 121 (A) 102 (A) 120 (A)   (A) Abnormal value            CMP    CMP 1/4/22 1/7/22 1/8/22   Glucose 76 107 (A) 98   BUN 10 14 7   Creatinine 0.69 0.78 0.66   eGFR Non  Am 101 88 107   eGFR  Am 123 107 129   Sodium 136 135 (A) 138   Potassium 4.5 4.1 4.2   Chloride 103 105 105   Calcium 9.3 8.3 (A) 8.8   Albumin 3.50 2.50 (A) 2.90 (A)   Total Bilirubin 0.2 0.2 0.2   Alkaline Phosphatase 206 (A) 135 (A) 130 (A)   AST (SGOT) 23 33 (A) 26   ALT (SGPT) 14 18 17   (A) Abnormal value              Assessment:    Post-partum/postop Day:  3  Afebrile day #2  Anemia c/w EBL, asymptomatic w assoc thrombocytopenia    Plan:   • Routine postpartum/postop care  • Saline lock IV  • Remove bandage  • Shower  • Importance of wound care/keep clean and dry  • Breast feeding support  • DC meds reviewed  • Follow up scheduled  • PP/PO precautions given  • HTN precautions reviewed in detail.  Questions answered.  RTO/ER for HA not relieved w  tylenol, vision changes, epig/RUQ pain, or Bps elevated at home.   • Abdominal binder for comfort  • Unsure on contraception, [email or web address] provided to patient  • Discharge home today          Keenan Coto MD

## 2022-01-09 NOTE — PLAN OF CARE
Problem: Breastfeeding  Goal: Effective Breastfeeding  Outcome: Ongoing, Progressing     Problem: Adjustment to Role Transition (Postpartum  Delivery)  Goal: Successful Maternal Role Transition  Outcome: Ongoing, Progressing     Problem: Bleeding (Postpartum  Delivery)  Goal: Hemostasis  Outcome: Ongoing, Progressing     Problem: Infection (Postpartum  Delivery)  Goal: Absence of Infection Signs and Symptoms  Outcome: Ongoing, Progressing     Problem: Pain (Postpartum  Delivery)  Goal: Acceptable Pain Control  Outcome: Ongoing, Progressing     Problem: Postoperative Nausea and Vomiting (Postpartum  Delivery)  Goal: Nausea and Vomiting Relief  Outcome: Ongoing, Progressing     Problem: Postoperative Urinary Retention (Postpartum  Delivery)  Goal: Effective Urinary Elimination  Outcome: Ongoing, Progressing   Goal Outcome Evaluation:     Progressing as expected

## 2022-01-09 NOTE — PLAN OF CARE
Problem: Breastfeeding  Goal: Effective Breastfeeding  Outcome: Met  Intervention: Promote Effective Breastfeeding  Recent Flowsheet Documentation  Taken 2022 by Arlyn Reagan RN  Parent/Child Attachment Promotion:   caring behavior modeled   cue recognition promoted  Intervention: Support Exclusive Breastfeeding Success  Recent Flowsheet Documentation  Taken 2022 by Arlyn Reagan RN  Supportive Measures:   active listening utilized   decision-making supported   goal setting facilitated   positive reinforcement provided   self-care encouraged   self-reflection promoted     Problem: Adjustment to Role Transition (Postpartum  Delivery)  Goal: Successful Maternal Role Transition  Outcome: Met  Intervention: Support Maternal Role Transition  Recent Flowsheet Documentation  Taken 2022 by Arlyn Reagan RN  Supportive Measures:   active listening utilized   decision-making supported   goal setting facilitated   positive reinforcement provided   self-care encouraged   self-reflection promoted  Parent/Child Attachment Promotion:   caring behavior modeled   cue recognition promoted     Problem: Bleeding (Postpartum  Delivery)  Goal: Hemostasis  Outcome: Met     Problem: Infection (Postpartum  Delivery)  Goal: Absence of Infection Signs and Symptoms  Outcome: Met     Problem: Pain (Postpartum  Delivery)  Goal: Acceptable Pain Control  Outcome: Met  Intervention: Prevent or Manage Pain  Recent Flowsheet Documentation  Taken 2022 by Arlyn Reagan RN  Pain Management Interventions: no interventions per patient request     Problem: Postoperative Nausea and Vomiting (Postpartum  Delivery)  Goal: Nausea and Vomiting Relief  Outcome: Met     Problem: Postoperative Urinary Retention (Postpartum  Delivery)  Goal: Effective Urinary Elimination  Outcome: Met  Intervention: Monitor and Manage Urinary Retention  Recent Flowsheet Documentation  Taken  1/9/2022 0900 by Arlyn Reagan, RN  Urinary Elimination Promotion: frequent voiding encouraged   Goal Outcome Evaluation:  Patient met goals and is being discharged home.

## 2022-01-13 NOTE — PROGRESS NOTES
Post Delivery EARLY Follow up (1-4weeks)      CC: BP check, taking no meds  Chief Complaint   Patient presents with   • Postpartum Care     BP check     Date of delivery: 2022  Delivery type:    Perineum: NA  Feeding: Breast    HPI:     HA:  No  Vision changes:  No  RUQ/epigastric pain:  No  Swelling:  No  Pain:  Yes,  incision  Vaginal Bleeding:  Yes  Depressed/Anxious:  No  EPDS score: 0   #10: 0  Weight at last OB OV: 162lb      PHYSICAL EXAM:  /85   Pulse 97   Wt 62.6 kg (138 lb)   LMP 2021   Breastfeeding Yes   BMI 25.24 kg/m²  Not found.  General- NAD, alert and oriented, appropriate  Psych- Normal mood, good memory, good eye contact  INCISION: Clean, dry, intact, no evidence of infection, steri strips  Abdomen- Soft, non distended, non tender, no masses  Ext- No edema, bilaterally equal, no signs of DVT  Skin- No lesions, no rashes    ASSESSMENT AND PLAN:  Diagnoses and all orders for this visit:    1. History of  (Primary)    2. Chorioamnionitis in third trimester, single or unspecified fetus- resolved    3. Gestational hypertension, third trimester-resolved    Counseling:    • Return to office for HA unrelieved w rest/hydration/OTC meds, vision changes, increase in edema, RUQ/epigastric pain, any concerns.  • Check BPs at home.  Return to office for systolic >155 OR diastolic >105.  • Postpartum/Postop  precautions reviewed.  • ok to remove steris    Follow Up:  Return in about 4 weeks (around 2022) for Postpartum FU.            Jane Olvera DO  2022    [facility]  [address] [place] [address]  Dept: [phone]  Dept Fax: [phone]  Loc: [phone]  Loc Fax: [phone]

## 2022-01-14 ENCOUNTER — OFFICE VISIT (OUTPATIENT)
Dept: OBSTETRICS AND GYNECOLOGY | Facility: CLINIC | Age: 27
End: 2022-01-14

## 2022-01-14 VITALS
SYSTOLIC BLOOD PRESSURE: 128 MMHG | HEART RATE: 97 BPM | BODY MASS INDEX: 25.24 KG/M2 | DIASTOLIC BLOOD PRESSURE: 85 MMHG | WEIGHT: 138 LBS

## 2022-01-14 DIAGNOSIS — O13.3 GESTATIONAL HYPERTENSION, THIRD TRIMESTER: ICD-10-CM

## 2022-01-14 DIAGNOSIS — Z98.891 HISTORY OF C-SECTION: Primary | ICD-10-CM

## 2022-01-14 DIAGNOSIS — O41.1230 CHORIOAMNIONITIS IN THIRD TRIMESTER, SINGLE OR UNSPECIFIED FETUS: ICD-10-CM

## 2022-01-14 PROCEDURE — 0503F POSTPARTUM CARE VISIT: CPT | Performed by: OBSTETRICS & GYNECOLOGY

## 2022-01-23 DIAGNOSIS — D50.9 IRON DEFICIENCY ANEMIA DURING PREGNANCY: ICD-10-CM

## 2022-01-23 DIAGNOSIS — O99.019 IRON DEFICIENCY ANEMIA DURING PREGNANCY: ICD-10-CM

## 2022-01-24 RX ORDER — FERROUS SULFATE 325(65) MG
325 TABLET ORAL EVERY OTHER DAY
Qty: 15 TABLET | Refills: 1 | Status: SHIPPED | OUTPATIENT
Start: 2022-01-24

## 2022-01-24 NOTE — TELEPHONE ENCOUNTER
Rx Refill Note  Requested Prescriptions     Pending Prescriptions Disp Refills   • ferrous sulfate 325 (65 FE) MG tablet [Pharmacy Med Name: FERROUS SULFATE 325 MG TABLET] 15 tablet 3     Sig: TAKE 1 TABLET BY MOUTH EVERY OTHER DAY      Last office visit with prescribing clinician: Delivered 1-6-22      Next office visit with prescribing clinician: Next appointment pp 2-7-22            Elsa Lee MA  01/24/22, 11:09 EST

## 2022-02-05 PROBLEM — D50.9 IRON DEFICIENCY ANEMIA DURING PREGNANCY: Status: RESOLVED | Noted: 2021-11-05 | Resolved: 2022-02-05

## 2022-02-05 PROBLEM — B00.9 HSV-1 INFECTION: Status: RESOLVED | Noted: 2021-11-05 | Resolved: 2022-02-05

## 2022-02-05 PROBLEM — O13.9 GESTATIONAL HYPERTENSION AFFECTING FIRST PREGNANCY: Status: RESOLVED | Noted: 2022-01-04 | Resolved: 2022-02-05

## 2022-02-05 PROBLEM — O99.019 IRON DEFICIENCY ANEMIA DURING PREGNANCY: Status: RESOLVED | Noted: 2021-11-05 | Resolved: 2022-02-05

## 2022-02-05 PROBLEM — O41.1290 ANTEPARTUM CHORIOAMNIONITIS: Status: RESOLVED | Noted: 2022-01-06 | Resolved: 2022-02-05

## 2022-02-06 NOTE — PROGRESS NOTES
"  POSTPARTUM Follow Up Visit    CC: Postpartum FU  Chief Complaint   Patient presents with   • Postpartum Care       HPI:    • Antepartum or Postpartum complications: GHTN, on no meds  • Date of delivery: 2022  • Delivery type:            Perineum: NA  • Delivering Provider:   Dr. Jane Olvera      • Feeding: Bottle  • Pain:  No  • Vaginal Bleeding:  Spotting   • Depressed/Anxious:  No  EPDS score: 10   #10: 0  • Plans for BC:  Desires to discuss options  • Weight at last OB OV:  162lb  Last Completed Pap Smear          PAP SMEAR (Every 3 Years) Next due on 2021  SCANNED - PAP SMEAR              Discharge Summary by Kenean Coto MD (2022 07:52)       PHYSICAL EXAM:  /73   Pulse (!) 137   Ht 157.5 cm (62\")   Wt 61.7 kg (136 lb)   Breastfeeding Yes   BMI 24.87 kg/m²  Not found.  General- NAD, alert and oriented, appropriate  Psych- Normal mood, good memory  INCISION: Clean, dry, intact, no evidence of infection  Abdomen- Soft, non distended, non tender, no masses    External genitalia- Normal.    Urethra/Bladder/Vagina- Normal, no masses, non-tender, no prolapse     Cvx- Normal, no lesions, no discharge, no CMT  Uterus- Normal size, shape & consistency.  Non tender, mobile, & no prolapse  Adnexa- Normal, no mass, non-tender    Lymphatic- No palpable groin nodes  Ext- No edema, no cyanosis   Skin- No lesions, no rashes, no acanthosis nigricans    ASSESSMENT AND PLAN:  Diagnoses and all orders for this visit:    1. Postpartum follow-up (Primary)    2. Birth control counseling  -     norethindrone (MICRONOR) 0.35 MG tablet; Take 1 tablet by mouth Daily.  Dispense: 90 tablet; Refill: 4      Counseling:    • All birth control options reviewed in detail.  R/B/A/SE/E of each wrt pts PMHx and prior BC use.  • May resume normal activities  • Core strengthening exercises reviewed and recommended  • Kegel exercises reviewed and recommended  • Ok to return to work/school  • Use " backup contraception for 4 weeks after initiating chosen BC.  • Considering IUDs info given, will call if desires    Follow Up:  Return in about 1 year (around 2/7/2023) for WWE.          Jane Olvera,   02/07/2022    [facility] [place] [facility]  [address] [place] [address]  Dept: [phone]  Dept Fax: [phone]  Loc: [phone]  Loc Fax: [phone]

## 2022-02-07 ENCOUNTER — POSTPARTUM VISIT (OUTPATIENT)
Dept: OBSTETRICS AND GYNECOLOGY | Facility: CLINIC | Age: 27
End: 2022-02-07

## 2022-02-07 VITALS
WEIGHT: 136 LBS | BODY MASS INDEX: 25.03 KG/M2 | DIASTOLIC BLOOD PRESSURE: 73 MMHG | HEIGHT: 62 IN | SYSTOLIC BLOOD PRESSURE: 112 MMHG | HEART RATE: 137 BPM

## 2022-02-07 DIAGNOSIS — Z30.09 BIRTH CONTROL COUNSELING: ICD-10-CM

## 2022-02-07 PROCEDURE — 0503F POSTPARTUM CARE VISIT: CPT | Performed by: OBSTETRICS & GYNECOLOGY

## 2022-02-07 RX ORDER — ACETAMINOPHEN AND CODEINE PHOSPHATE 120; 12 MG/5ML; MG/5ML
1 SOLUTION ORAL DAILY
Qty: 90 TABLET | Refills: 4 | Status: SHIPPED | OUTPATIENT
Start: 2022-02-07

## 2022-06-10 NOTE — PLAN OF CARE
Goal Outcome Evaluation: Pt up to BR discussed poc and introduced staff pt vu poc denies any pain.                  Pt resting with no complaints at this time. RR easy and unlabored. Bed locked and in lowest position. Pt updated on care plan/status. Will continue to monitor.

## 2024-12-23 NOTE — PROGRESS NOTES
OB Initial Visit    CC- Here for care of current pregnancy, first visit    Subjective:  29 y.o.  presenting for her first obstetrical visit.    LMP: Patient's last menstrual period was 10/31/2024.     Denies any complaints, is doing well  Prior OBSTETRIC history is significant for: GHTN, PP Hemorrhage,  delivery.    Happy for pregnancy    Reviewed and updated:  OBHx, GYNHx (STDs), PMHx, Medications, Allergies, PSHx, Social Hx, Preventative Hx (PAP), Hx of abuse/safe environment, Vaccine Hx including hx of chickenpox or vaccine, Genetic Hx (pt, FOB, both families).        Objective:  /78   Wt 61.7 kg (136 lb)   LMP 10/31/2024   BMI 24.87 kg/m²      Urine pregnancy test is positive     General- NAD, alert and oriented, appropriate  Psych- Normal mood, good memory  Neck- No masses, no thyroid enlargement  CV- Regular rhythm, no murnurs  Resp- CTA to bases, no wheezes  Abdomen- Soft, non distended, non tender, no masses    Breast left- deferred  Breast right- deferred    External genitalia- Normal, no lesions  Urethra- Normal, no masses, non tender  Vagina- Normal, no discharge  Bladder- Normal, no masses, non tender  Cvx- Normal, no lesions, no discharge, no CMT  Uterus- Normal shape and consistency, non tender, Consistent with dates, Bedside US consistent with dates.  -160..    Adnexa- Normal, no mass, non tender    Lymphatic- No palpable neck, axillary, or groin nodes  Ext- No edema, no cyanosis    Skin- No lesions, no rashes, no acanthosis nigricans    Assessment and Plan:  Unknown  Diagnoses and all orders for this visit:    1. Supervision of other normal pregnancy, antepartum (Primary)  Overview:  COLLINS finalized: 25 per LMP, dating scan ordered    Optional testing NIPS,CF/SMA,AFP:  Patient is considering    COVID: Fully vaccinated, booster recommended  Tdap:  RSV:  Flu: Vaccinated for 24-25 season    Rhogam:  1hr Glucola:  FU RPR w glucola:  ? Desires Sterilization:    Anatomy  US:  FU US:    PROBLEM LIST/PLAN:   Hx of GHTN - baseline labs pending, ASA 81 mg at 12-14 weeks    Previous  - G1 for suspected CPD    Hx of PP hemorrhage - during previous         Orders:  -     POC Urinalysis Dipstick  -     IGP,CtNgTv,Apt HPV,rfx 16 / 18,45  -     OB Panel With HIV and RPR  -     Urine Culture - Urine, Urine, Clean Catch  -     Urine Drug Screen - Urine, Clean Catch  -     Hemoglobinopathy Fractionation [place]  -     POC Pregnancy, Urine  -     US Ob < 14 Weeks Single or First Gestation; Future    2. History of gestational hypertension  Overview:  Baseline labs pending  ASA 81 mg at 12-14 weeks    Orders:  -     Comprehensive Metabolic Panel  -     Protein / Creatinine Ratio, Urine - Urine, Clean Catch    3. Previous  section  Overview:  G1 for suspected CPD      4. Hx of postpartum hemorrhage, currently pregnant  Overview:  Uterine atony during previous           Genetic Screening:   Considering   NIPS    Vaccines:   s/p FLU vaccine this season  s/p COVID vaccine  COVID booster recommended    Counseling:   Nutrition discussed, calories, activity/exercise in pregnancy  Discussed dietary restrictions/safety food preparation in pregnancy  Reviewed what to expect prenatal visits, office providers (female and male) and covering [facility]/Dr. Brunner  Appropriate trimester precautions provided, N/V, vag bleeding, cramping  Questions answered    Labs:   Prenatal labs, cultures, and PAP performed (prn), CMP, Upc ratio    Return in about 4 weeks (around 2025) for OB follow up, Dr. Olvera.      Rashaun Nielson, APRN  2025    [facility]  [facility]  [address] [place] [address]  Dept: [phone]  Dept Fax: [phone]  Loc: [phone]

## 2025-01-07 ENCOUNTER — INITIAL PRENATAL (OUTPATIENT)
Dept: OBSTETRICS AND GYNECOLOGY | Facility: CLINIC | Age: 30
End: 2025-01-07
Payer: COMMERCIAL

## 2025-01-07 VITALS — SYSTOLIC BLOOD PRESSURE: 120 MMHG | DIASTOLIC BLOOD PRESSURE: 78 MMHG | BODY MASS INDEX: 24.87 KG/M2 | WEIGHT: 136 LBS

## 2025-01-07 DIAGNOSIS — Z98.891 PREVIOUS CESAREAN SECTION: ICD-10-CM

## 2025-01-07 DIAGNOSIS — O09.299 HX OF POSTPARTUM HEMORRHAGE, CURRENTLY PREGNANT: ICD-10-CM

## 2025-01-07 DIAGNOSIS — Z87.59 HISTORY OF GESTATIONAL HYPERTENSION: ICD-10-CM

## 2025-01-07 DIAGNOSIS — Z34.80 SUPERVISION OF OTHER NORMAL PREGNANCY, ANTEPARTUM: Primary | ICD-10-CM

## 2025-01-07 LAB
ABO GROUP BLD: NORMAL
ALBUMIN SERPL-MCNC: 4.3 G/DL (ref 3.5–5.2)
ALBUMIN/GLOB SERPL: 1.4 G/DL
ALP SERPL-CCNC: 34 U/L (ref 39–117)
ALT SERPL W P-5'-P-CCNC: 12 U/L (ref 1–33)
AMPHET+METHAMPHET UR QL: NEGATIVE
AMPHETAMINES UR QL: NEGATIVE
ANION GAP SERPL CALCULATED.3IONS-SCNC: 9.5 MMOL/L (ref 5–15)
AST SERPL-CCNC: 17 U/L (ref 1–32)
B-HCG UR QL: POSITIVE
BARBITURATES UR QL SCN: NEGATIVE
BASOPHILS # BLD AUTO: 0.03 10*3/MM3 (ref 0–0.2)
BASOPHILS NFR BLD AUTO: 0.4 % (ref 0–1.5)
BENZODIAZ UR QL SCN: NEGATIVE
BILIRUB SERPL-MCNC: 0.4 MG/DL (ref 0–1.2)
BLD GP AB SCN SERPL QL: NEGATIVE
BUN SERPL-MCNC: 10 MG/DL (ref 6–20)
BUN/CREAT SERPL: 16.9 (ref 7–25)
BUPRENORPHINE SERPL-MCNC: NEGATIVE NG/ML
CALCIUM SPEC-SCNC: 9.2 MG/DL (ref 8.6–10.5)
CANNABINOIDS SERPL QL: NEGATIVE
CHLORIDE SERPL-SCNC: 103 MMOL/L (ref 98–107)
CO2 SERPL-SCNC: 22.5 MMOL/L (ref 22–29)
COCAINE UR QL: NEGATIVE
CREAT SERPL-MCNC: 0.59 MG/DL (ref 0.57–1)
CREAT UR-MCNC: 95.5 MG/DL
DEPRECATED RDW RBC AUTO: 40.4 FL (ref 37–54)
EGFRCR SERPLBLD CKD-EPI 2021: 123.7 ML/MIN/1.73
EOSINOPHIL # BLD AUTO: 0.21 10*3/MM3 (ref 0–0.4)
EOSINOPHIL NFR BLD AUTO: 2.7 % (ref 0.3–6.2)
ERYTHROCYTE [DISTWIDTH] IN BLOOD BY AUTOMATED COUNT: 12.5 % (ref 12.3–15.4)
EXPIRATION DATE: ABNORMAL
FENTANYL UR-MCNC: NEGATIVE NG/ML
GLOBULIN UR ELPH-MCNC: 3 GM/DL
GLUCOSE SERPL-MCNC: 94 MG/DL (ref 65–99)
GLUCOSE UR STRIP-MCNC: NEGATIVE MG/DL
HBV SURFACE AG SERPL QL IA: NORMAL
HCT VFR BLD AUTO: 40.6 % (ref 34–46.6)
HCV AB SER QL: NORMAL
HGB BLD-MCNC: 13.5 G/DL (ref 12–15.9)
HIV 1+2 AB+HIV1 P24 AG SERPL QL IA: NORMAL
IMM GRANULOCYTES # BLD AUTO: 0.02 10*3/MM3 (ref 0–0.05)
IMM GRANULOCYTES NFR BLD AUTO: 0.3 % (ref 0–0.5)
INTERNAL NEGATIVE CONTROL: NEGATIVE
INTERNAL POSITIVE CONTROL: ABNORMAL
LYMPHOCYTES # BLD AUTO: 1.68 10*3/MM3 (ref 0.7–3.1)
LYMPHOCYTES NFR BLD AUTO: 21.5 % (ref 19.6–45.3)
Lab: ABNORMAL
MCH RBC QN AUTO: 29.2 PG (ref 26.6–33)
MCHC RBC AUTO-ENTMCNC: 33.3 G/DL (ref 31.5–35.7)
MCV RBC AUTO: 87.7 FL (ref 79–97)
METHADONE UR QL SCN: NEGATIVE
MONOCYTES # BLD AUTO: 0.48 10*3/MM3 (ref 0.1–0.9)
MONOCYTES NFR BLD AUTO: 6.1 % (ref 5–12)
NEUTROPHILS NFR BLD AUTO: 5.39 10*3/MM3 (ref 1.7–7)
NEUTROPHILS NFR BLD AUTO: 69 % (ref 42.7–76)
NRBC BLD AUTO-RTO: 0 /100 WBC (ref 0–0.2)
OPIATES UR QL: NEGATIVE
OXYCODONE UR QL SCN: NEGATIVE
PCP UR QL SCN: NEGATIVE
PLATELET # BLD AUTO: 185 10*3/MM3 (ref 140–450)
PMV BLD AUTO: 11.3 FL (ref 6–12)
POTASSIUM SERPL-SCNC: 3.6 MMOL/L (ref 3.5–5.2)
PROT ?TM UR-MCNC: 7.8 MG/DL
PROT SERPL-MCNC: 7.3 G/DL (ref 6–8.5)
PROT UR STRIP-MCNC: NEGATIVE MG/DL
PROT/CREAT UR: 0.08 MG/G{CREAT}
RBC # BLD AUTO: 4.63 10*6/MM3 (ref 3.77–5.28)
RH BLD: POSITIVE
SODIUM SERPL-SCNC: 135 MMOL/L (ref 136–145)
TRICYCLICS UR QL SCN: NEGATIVE
WBC NRBC COR # BLD AUTO: 7.81 10*3/MM3 (ref 3.4–10.8)

## 2025-01-07 PROCEDURE — 80081 OBSTETRIC PANEL INC HIV TSTG: CPT | Performed by: NURSE PRACTITIONER

## 2025-01-07 PROCEDURE — 82570 ASSAY OF URINE CREATININE: CPT | Performed by: NURSE PRACTITIONER

## 2025-01-07 PROCEDURE — 84156 ASSAY OF PROTEIN URINE: CPT | Performed by: NURSE PRACTITIONER

## 2025-01-07 PROCEDURE — 87624 HPV HI-RISK TYP POOLED RSLT: CPT | Performed by: NURSE PRACTITIONER

## 2025-01-07 PROCEDURE — 80307 DRUG TEST PRSMV CHEM ANLYZR: CPT | Performed by: NURSE PRACTITIONER

## 2025-01-07 PROCEDURE — G0123 SCREEN CERV/VAG THIN LAYER: HCPCS | Performed by: NURSE PRACTITIONER

## 2025-01-07 PROCEDURE — 87086 URINE CULTURE/COLONY COUNT: CPT | Performed by: NURSE PRACTITIONER

## 2025-01-07 PROCEDURE — 86803 HEPATITIS C AB TEST: CPT | Performed by: NURSE PRACTITIONER

## 2025-01-07 PROCEDURE — 87491 CHLMYD TRACH DNA AMP PROBE: CPT | Performed by: NURSE PRACTITIONER

## 2025-01-07 PROCEDURE — 80053 COMPREHEN METABOLIC PANEL: CPT | Performed by: NURSE PRACTITIONER

## 2025-01-07 PROCEDURE — 83020 HEMOGLOBIN ELECTROPHORESIS: CPT | Performed by: NURSE PRACTITIONER

## 2025-01-07 PROCEDURE — 87591 N.GONORRHOEAE DNA AMP PROB: CPT | Performed by: NURSE PRACTITIONER

## 2025-01-07 PROCEDURE — 87661 TRICHOMONAS VAGINALIS AMPLIF: CPT | Performed by: NURSE PRACTITIONER

## 2025-01-07 NOTE — PROGRESS NOTES
Venipuncture performed in Right Arm (site) by [employer] (employee) with good hemostasis. Patient tolerated well. Date 01/07/25.AF

## 2025-01-08 LAB — RPR SER QL: NORMAL

## 2025-01-09 LAB
BACTERIA SPEC AEROBE CULT: NO GROWTH
HGB A MFR BLD ELPH: 97.4 % (ref 96.4–98.8)
HGB A2 MFR BLD ELPH: 2.6 % (ref 1.8–3.2)
HGB F MFR BLD ELPH: 0 % (ref 0–2)
HGB FRACT BLD-IMP: NORMAL
HGB S MFR BLD ELPH: 0 %
RUBV IGG SERPL IA-ACNC: 7.68 INDEX

## 2025-01-10 LAB
C TRACH RRNA CVX QL NAA+PROBE: NEGATIVE
CYTOLOGIST CVX/VAG CYTO: NORMAL
CYTOLOGY CVX/VAG DOC CYTO: NORMAL
CYTOLOGY CVX/VAG DOC THIN PREP: NORMAL
DX ICD CODE: NORMAL
HPV GENOTYPE REFLEX: NORMAL
HPV I/H RISK 4 DNA CVX QL PROBE+SIG AMP: NEGATIVE
Lab: NORMAL
N GONORRHOEA RRNA CVX QL NAA+PROBE: NEGATIVE
OTHER STN SPEC: NORMAL
STAT OF ADQ CVX/VAG CYTO-IMP: NORMAL
T VAGINALIS RRNA SPEC QL NAA+PROBE: NEGATIVE

## 2025-01-20 ENCOUNTER — TELEPHONE (OUTPATIENT)
Dept: OBSTETRICS AND GYNECOLOGY | Facility: CLINIC | Age: 30
End: 2025-01-20
Payer: COMMERCIAL

## 2025-01-20 NOTE — TELEPHONE ENCOUNTER
Attempted to call patient back, no answer, left VM. Patient left message with answering service over the weekend. Went to  and has the flu, 11 weeks pregnant and wanted to know what she could take. I will [facility] and let her know that she can call or message back with any questions or concerns.

## 2025-01-31 ENCOUNTER — TELEPHONE (OUTPATIENT)
Dept: OBSTETRICS AND GYNECOLOGY | Facility: CLINIC | Age: 30
End: 2025-01-31
Payer: COMMERCIAL

## 2025-01-31 NOTE — TELEPHONE ENCOUNTER
Provider: DR ORTEGA     Caller: Anna Silva    Relationship to Patient: Self        Reason for Call: PT IS WANTING TO FIND OUT THE CPT CODE FOR THE GENETICS TEST SO SHE CAN FIND OUT FROM INSURANCE HOW MUCH IT WILL COST HER PLEASE CALL

## 2025-01-31 NOTE — PROGRESS NOTES
OB FOLLOW UP      Chief Complaint   Patient presents with    Routine Prenatal Visit     Subjective:   Dating US today, LMP sure regular q 24-28 days,  COLLINS by US changed to  per ACOG and is consistent w shorter cycle    Recent flu, eating better now    No complaints.  Denies VB, leaking fluid, current regular cramping or contractions.    Objective:  /83   Wt 59.4 kg (131 lb)   LMP 10/31/2024   BMI 23.96 kg/m²  0.454 kg (1 lb) Body mass index is 23.96 kg/m².  Chaperone present during pelvic exam if performed.  See OB flow sheet for fundal height (not performed if US day of OV), FHT, edema, cvx exam if performed, and Upro/Uglu.       Assessment and Plan:  13w5d     Diagnoses and all orders for this visit:    1. Supervision of other normal pregnancy, antepartum (Primary)  Overview:  COLLINS finalized: 25 per 14week US and per ACOG    Optional testing NIPS,CF/SMA,AFP:  Declines CF.  NIPS 2025     COVID: Recommended  Flu: Vaccinated for - season  Tdap:    1hr Glucola:  FU RPR w glucola:  ? 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  No blood transfusion.  Recommend RCS.  Op Note by Jane Olvera DO (2022 09:44)  Hx cold sores- plan prophylaxis 34-36weeks    Orders:  -     POC Urinalysis Dipstick  -     [facility] Panorama Prenatal Test: Chromosomes 13, 18, 21, X & Y: Triploidy 22Q.11.2 Deletion - Blood,; Future  -     US Ob Detail Fetal Anatomy Single or First Gestation; Future  -     [facility] Panorama Prenatal Test: Chromosomes 13, 18, 21, X & Y: Triploidy 22Q.11.2 Deletion - Blood, Blood, Venous    Other orders  -     aspirin 81 MG EC tablet; Take 1 tablet by mouth Daily.  Dispense: 90 tablet; Refill: 7      Counseling:    Second trimester precautions  VACCINE importance in pregnancy discussed.  Maternal and  fetal risk of not being vaccinated reviewed NLT increased risk maternal/fetal severity of illness/death, PTD, CS, hemorrhage, HTN, possible IUGR and/or IUFD.  Maternal and fetal/infant benefit w vaccination.  FDA approval and ACOG/SMFM/CDC recommendation in pregnancy.  Questions answered.     Reassuring pregnancy progress. Questions answered.  Continue PNV.  Importance of healthy eating, obtaining sufficient sleep, and staying active unless hypertensive- activity modified as directed.    Return in about 4 weeks (around 3/4/2025) for FU OB and then 2weeks later w anatomy US.            Jane Olvera,   02/04/2025    [facility]  [facility] [place] [address]  Dept: [phone]  Dept Fax: [phone]  Loc: [phone]  Loc Fax: [phone]

## 2025-02-04 ENCOUNTER — ROUTINE PRENATAL (OUTPATIENT)
Dept: OBSTETRICS AND GYNECOLOGY | Facility: CLINIC | Age: 30
End: 2025-02-04
Payer: COMMERCIAL

## 2025-02-04 VITALS — SYSTOLIC BLOOD PRESSURE: 129 MMHG | WEIGHT: 131 LBS | BODY MASS INDEX: 23.96 KG/M2 | DIASTOLIC BLOOD PRESSURE: 83 MMHG

## 2025-02-04 DIAGNOSIS — Z34.80 SUPERVISION OF OTHER NORMAL PREGNANCY, ANTEPARTUM: Primary | ICD-10-CM

## 2025-02-04 PROBLEM — Z86.19 HISTORY OF COLD SORES: Status: ACTIVE | Noted: 2025-02-04

## 2025-02-04 LAB
GLUCOSE UR STRIP-MCNC: NEGATIVE MG/DL
PROT UR STRIP-MCNC: NEGATIVE MG/DL

## 2025-02-04 RX ORDER — ASPIRIN 81 MG/1
81 TABLET ORAL DAILY
Qty: 90 TABLET | Refills: 7 | Status: SHIPPED | OUTPATIENT
Start: 2025-02-04

## 2025-02-28 NOTE — PROGRESS NOTES
OB FOLLOW UP      Chief Complaint   Patient presents with    Routine Prenatal Visit     Subjective:     No complaints.  Denies VB, leaking fluid, current regular cramping or contractions.    Objective:  /69   Wt 62.6 kg (138 lb)   LMP 10/31/2024   BMI 25.24 kg/m²  3.629 kg (8 lb) Body mass index is 25.24 kg/m².  Chaperone present during pelvic exam if performed.  See OB flow sheet for fundal height (not performed if US day of OV), FHT, edema, cvx exam if performed, and Upro/Uglu.       Assessment and Plan:  18w5d     Diagnoses and all orders for this visit:    1. Supervision of other normal pregnancy, antepartum (Primary)  Overview:  COLLINS finalized: 25 per 14week US and per ACOG    Optional testing NIPS,CF/SMA,AFP:  Declined CF.  NIPS low risk XY.  Declines AFP 3/3/2025     COVID: Recommended  Flu: Vaccinated for 24-25 season  Tdap:    1hr Glucola:  FU RPR w glucola:  ? 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  No blood transfusion.  Recommend RCS.  Op Note by Jane Olvera DO (2022 09:44)  Hx cold sores- plan prophylaxis 34-36weeks    Orders:  -     POC Urinalysis Dipstick      Counseling:    Second trimester precautions    Reassuring pregnancy progress. Continue PNV.      Return in about 2 weeks (around 3/17/2025) for FU OB w anatomy US, then OV 4weeks later w glucola.            Jane Olvera DO  2025    [facility]  [address] [place] [address]  Dept: [phone]  Dept Fax: [phone]  Loc: [phone]  Loc Fax: [phone]   Non-Graft Cartilage Fenestration Text: The cartilage was fenestrated with a 2mm punch biopsy to help facilitate healing.

## 2025-03-03 ENCOUNTER — ROUTINE PRENATAL (OUTPATIENT)
Dept: OBSTETRICS AND GYNECOLOGY | Facility: CLINIC | Age: 30
End: 2025-03-03
Payer: COMMERCIAL

## 2025-03-03 VITALS — SYSTOLIC BLOOD PRESSURE: 107 MMHG | DIASTOLIC BLOOD PRESSURE: 69 MMHG | BODY MASS INDEX: 25.24 KG/M2 | WEIGHT: 138 LBS

## 2025-03-03 DIAGNOSIS — Z34.80 SUPERVISION OF OTHER NORMAL PREGNANCY, ANTEPARTUM: Primary | ICD-10-CM

## 2025-03-03 LAB
GLUCOSE UR STRIP-MCNC: NEGATIVE MG/DL
PROT UR STRIP-MCNC: NEGATIVE MG/DL

## 2025-03-14 NOTE — PROGRESS NOTES
OB FOLLOW UP      Chief Complaint   Patient presents with    Routine Prenatal Visit     Subjective:   No complaints.  Denies VB, leaking fluid, current regular cramping or contractions.    Objective:  /68   Wt 63.5 kg (140 lb)   LMP 10/31/2024   BMI 25.61 kg/m²  4.536 kg (10 lb) Body mass index is 25.61 kg/m².  Chaperone present during pelvic exam if performed.  See OB flow sheet for fundal height (not performed if US day of OV), FHT, edema, cvx exam if performed, and Upro/Uglu.       Assessment and Plan:  20w5d     Diagnoses and all orders for this visit:    1. Supervision of other normal pregnancy, antepartum (Primary)  Overview:  COLLINS finalized: 25 per 14week US and per ACOG    Declined CF.  NIPS low risk XY.  Declined AFP .    COVID: Recommended  Flu: Vaccinated for 24-25 season  Tdap:    1hr Glucola:  FU RPR w glucola:  ? Desires Sterilization:    Anatomy US: [facility] 3/17/25 cwd, post placenta, CPC bilat.  Boy.   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  No blood transfusion.  Recommend RCS.  Op Note by Jane Olvera DO (2022 09:44)  Hx cold sores- plan prophylaxis 34-36weeks    Orders:  -     POC Urinalysis Dipstick    2. Choroid plexus cyst  -     Ambulatory Referral to MFM/Perinatology      Counseling:    Second trimester precautions  CHOROID PLEXUS CYSTS (CPC) discussed.  Seen in 3% of all fetuses and can be a normal variant.  Seen in 30-50% of T18 fetuses.  Discussed options of NIPS if not already done and/or MFM consult with level II targeted US to look for findings associated w T18.  No association with neurodevelpmental delay.  Questions answered.    Reassuring pregnancy progress. Continue PNV.      Return in about 4 weeks (around 2025) for FU OB w glucola, then OV 3weeks later.            Jane Olvera DO  2025    [facility]  [facility]  [address] [place] [address]  Dept: [phone]  Dept Fax: [phone]  Loc: [phone]  Loc Fax: [phone]

## 2025-03-17 ENCOUNTER — ROUTINE PRENATAL (OUTPATIENT)
Dept: OBSTETRICS AND GYNECOLOGY | Facility: CLINIC | Age: 30
End: 2025-03-17
Payer: COMMERCIAL

## 2025-03-17 ENCOUNTER — TELEPHONE (OUTPATIENT)
Dept: OBSTETRICS AND GYNECOLOGY | Facility: CLINIC | Age: 30
End: 2025-03-17
Payer: COMMERCIAL

## 2025-03-17 VITALS — BODY MASS INDEX: 25.61 KG/M2 | DIASTOLIC BLOOD PRESSURE: 68 MMHG | SYSTOLIC BLOOD PRESSURE: 108 MMHG | WEIGHT: 140 LBS

## 2025-03-17 DIAGNOSIS — Z34.80 SUPERVISION OF OTHER NORMAL PREGNANCY, ANTEPARTUM: Primary | ICD-10-CM

## 2025-03-17 DIAGNOSIS — G93.0 CHOROID PLEXUS CYST: ICD-10-CM

## 2025-03-17 LAB
GLUCOSE UR STRIP-MCNC: NEGATIVE MG/DL
PROT UR STRIP-MCNC: NEGATIVE MG/DL

## 2025-03-17 PROCEDURE — 0502F SUBSEQUENT PRENATAL CARE: CPT | Performed by: OBSTETRICS & GYNECOLOGY

## 2025-03-28 ENCOUNTER — TRANSCRIBE ORDERS (OUTPATIENT)
Dept: ULTRASOUND IMAGING | Facility: HOSPITAL | Age: 30
End: 2025-03-28
Payer: COMMERCIAL

## 2025-03-28 DIAGNOSIS — G93.0 CHOROID PLEXUS CYST: Primary | ICD-10-CM

## 2025-04-02 NOTE — PROGRESS NOTES
MATERNAL FETAL MEDICINE Consult Note    Dear Dr Jane Olvera DO:    Thank you for your kind referral of Anna Silva.  As you know, she is a 29 y.o.   23w2d gestation (Estimated Date of Delivery: 25). This is a consult.      Her antepartum course is complicated by:  - Abnormal fetal ultrasound, choroid plexus cyst    Aneuploidy Screening:  Carrier Screening:    HPI: She is a 29 year old  at 23w2d. In mid-March she had an anatomy scan in [facility] that revealed bilateral choroid plexus cysts.       Review of History:  Past Medical History:   Diagnosis Date    Antepartum chorioamnionitis 2022    Gestational hypertension     History of cold sores     Single delivery by  2022     Past Surgical History:   Procedure Laterality Date     SECTION N/A 2022    Procedure:  SECTION PRIMARY;  Surgeon: Jane Olvera DO;  Location: [facility] LABOR DELIVERY;  Service: Obstetrics/Gynecology;  Laterality: N/A;    WISDOM TOOTH EXTRACTION         OB Hx:  OB History    Para Term  AB Living   2 1 1   1   SAB IAB Ectopic Molar Multiple Live Births       0 1      # Outcome Date GA Lbr Amilcar/2nd Weight Sex Type Anes PTL Lv   2 Current            1 Term 22 39w0d  3630 g (8 lb) F CS-LTranv EPI N LAY      Complications: Intraamniotic Infection, Postpartum Hemorrhage, Cephalopelvic Disproportion       Social History     Socioeconomic History    Marital status:      Spouse name: Arvind Silva     Number of children: 0    Years of education: 14    Highest education level: Associate degree: occupational, technical, or vocational program   Tobacco Use    Smoking status: Never     Passive exposure: Never    Smokeless tobacco: Never   Vaping Use    Vaping status: Never Used   Substance and Sexual Activity    Alcohol use: Never    Drug use: Never    Sexual activity: Yes     Partners: Male     Birth control/protection: None     Family History   Problem Relation Age of  "Onset    Hyperlipidemia Mother     Hypertension Mother     Hyperlipidemia Father     Diabetes Father     Diabetes Maternal Grandfather     Diabetes Paternal Grandmother     Alcohol abuse Neg Hx     Arthritis Neg Hx     Asthma Neg Hx     Birth defects Neg Hx     Bleeding Disorder Neg Hx     Cancer Neg Hx     COPD Neg Hx     Depression Neg Hx     Drug abuse Neg Hx     Early death Neg Hx     Hearing loss Neg Hx     Heart disease Neg Hx     Breast cancer Neg Hx     Ovarian cancer Neg Hx     Uterine cancer Neg Hx     Colon cancer Neg Hx     Melanoma Neg Hx     Prostate cancer Neg Hx     Deep vein thrombosis Neg Hx     Pulmonary embolism Neg Hx       No Known Allergies   Current Outpatient Medications on File Prior to Visit   Medication Sig Dispense Refill    aspirin 81 MG EC tablet Take 1 tablet by mouth Daily. 90 tablet 7    Prenatal Vit-Fe Fumarate-FA (prenatal vitamin 27-0.8) 27-0.8 MG tablet tablet Take 1 tablet by mouth Daily.       No current facility-administered medications on file prior to visit.        Past obstetric, gynecological, medical, surgical, family and social history reviewed.  Relevant lab work and imaging reviewed.    Review of systems  Constitutional:  denies fever, chills, malaise.   ENT/Mouth:  denies sore throat, tinnitus  Eyes: denies vision changes/pain  CV:  denies chest pain  Respiratory:  denies cough/SOB  GI:  denies N/V, diarrhea, abdominal pain.    :   denies dysuria  Skin:  denies lesions or pruritus   Neuro:  denies weakness, focal neurologic symptoms    Vitals:    04/04/25 0915   BP: 120/75   BP Location: Right arm   Patient Position: Sitting   Pulse: 71   Temp: 97.7 °F (36.5 °C)   TempSrc: Temporal   SpO2: 100%   Weight: 64.8 kg (142 lb 12.8 oz)   Height: 157.5 cm (62\")       PHYSICAL EXAM   GENERAL: Not in acute distress, AAOx3, pleasant    LABS:   NIPT - Low risk male      ULTRASOUND   Please view full ultrasound note on Imaging tab in ViewPoint.  Breech -  bpm  Posterior " left placenta, MVP 7cm (normal amniotic fluid)   grams (1 lb 3 oz), 32%, AC 28%  Fetal anatomy appears normal, choroid plexus cysts not seen. Suboptimal s-spine due to fetal position    ASSESSMENT/COUNSELIN y.o.   23w0d gestation (Estimated Date of Delivery: 25).     DISCUSSION:   CPCs are identified in approximately 1% to 2% of fetuses in the second trimester of pregnancy and are commonly isolated findings in normal fetuses. CPCs are associated with trisomy 18, as they are present in 30% to 50% of fetuses with this disorder.62 When a fetus is affected by trisomy 18, multiple structural anomalies are almost always evident, including structural heart defects, clenched hands, talipes deformity of the feet, FGR, and polyhydramnios. When a structural anomaly is present in addition to CPCs, there is a much higher likelihood of trisomy 18 or other chromosome anomalies.  Based on the literature, the best estimate for the Trisomy 18 LR is <2, suggesting a minimal risk. Given the T18 incidence of 1 in 3600 to 10,000 births, the chance for trisomy 18 in a normally grown fetus with isolated choroid plexus cyst(s) approximately 1 in 2000. The presence of a CPC does not alter the risk of trisomy 21.    A CPC is not considered a structural or functional brain abnormality, and nearly all CPCs resolve by 28 weeks.    Neurodevelopmental outcomes in children with euploidy born after a prenatal diagnosis of CPCs have not shown differences in neurocognitive ability, motor function, or behavior.     For pregnant people with no previous aneuploidy screening and isolated CPCs - recommend counseling to estimate the probability of trisomy 18 and a discussion of options for non-invasive aneuploidy screening with cfDNA or quad screen if cfDNA is unavailable or cost-prohibitive.    - Although any patient may request diagnostic testing for any indication, we do not recommend diagnostic testing solely for this  indication.     For pregnant people with negative serum or cfDNA screening results and isolated CPCs - recommend no further aneuploidy evaluation, as this finding is a normal variant of no clinical importance with no indication for follow-up ultrasound imaging or  evaluation.      -Pregnancy  [ X ] stable  [   ] improving [  ] worsening    Diagnoses and all orders for this visit:    1. Suspected fetal anomaly, antepartum, single or unspecified fetus (Primary)    2. Choroid plexus cyst       Summary of Plan  - Further evaluation for choroid plexus cysts is not necessary.  - Given the past history of gestation hypertension, a follow up growth scan should be done at approximately 30 weeks, then q 4 weeks thereafter   - the 30 weeks scan was scheduled with [facility] for reevaluation of anatomy   - Thereafter, the growth can be done in the primary office. But, we are happy continue if preferred.   -Starting at 28 weeks: Fetal movement instructions given continue daily until delivery; instructed to report to labor and delivery if cannot achieve more than 10 kicks in one hour or if she perceives a decrease in fetal movement    Thank you for the consult and opportunity to care for this patient.  Please feel free to reach out with any questions or concerns.      I spent 40 minutes caring for this patient on this date of service. This time includes time spent by me in the following activities: preparing for the visit, reviewing tests, obtaining and/or reviewing a separately obtained history, performing a medically appropriate examination and/or evaluation, counseling and educating the patient/family/caregiver and independently interpreting results and communicating that information with the patient/family/caregiver with greater than 50% spent in counseling and coordination of care. This time did NOT include time for interpretation of imaging or electronic fetal monitoring.     Gary Hernandez MD FACOG  Maternal Fetal  [facility]  Office: [phone]  [email or web address]

## 2025-04-04 ENCOUNTER — HOSPITAL ENCOUNTER (OUTPATIENT)
Dept: ULTRASOUND IMAGING | Facility: HOSPITAL | Age: 30
Discharge: HOME | End: 2025-04-04
Admitting: OBSTETRICS & GYNECOLOGY
Payer: COMMERCIAL

## 2025-04-04 ENCOUNTER — OFFICE VISIT (OUTPATIENT)
Dept: OBSTETRICS AND GYNECOLOGY | Facility: CLINIC | Age: 30
End: 2025-04-04
Payer: COMMERCIAL

## 2025-04-04 VITALS
HEART RATE: 71 BPM | BODY MASS INDEX: 26.28 KG/M2 | TEMPERATURE: 97.7 F | HEIGHT: 62 IN | SYSTOLIC BLOOD PRESSURE: 120 MMHG | OXYGEN SATURATION: 100 % | WEIGHT: 142.8 LBS | DIASTOLIC BLOOD PRESSURE: 75 MMHG

## 2025-04-04 DIAGNOSIS — O35.9XX0 SUSPECTED FETAL ANOMALY, ANTEPARTUM, SINGLE OR UNSPECIFIED FETUS: Primary | ICD-10-CM

## 2025-04-04 DIAGNOSIS — G93.0 CHOROID PLEXUS CYST: ICD-10-CM

## 2025-04-04 PROCEDURE — 76811 OB US DETAILED SNGL FETUS: CPT

## 2025-04-04 NOTE — PROGRESS NOTES
Pt reports that she is doing well and denies vaginal bleeding, cramping, contractions or LOF at this time. Reports active fetal movement. Reviewed when to call OB office or present to L&D for evaluation with symptoms such as decreased fetal movement, vaginal bleeding, LOF or ctxs. Pt verbalized understanding. Denies HA, visual changes or epigastric pain. Denies any additional complaints at time of appointment. Next OB appointment scheduled for 4/14.    Vitals:    04/04/25 0915   BP: 120/75   Pulse: 71   Temp: 97.7 °F (36.5 °C)   SpO2: 100%

## 2025-04-04 NOTE — LETTER
2025     Jane Olvera DO  [address] [place] [address]    Patient: Anna Silva   YOB: 1995   Date of Visit: 2025       Dear Jane Olvera DO,    Thank you for referring Anna Silva to me for evaluation. Below is a copy of my consult note.    If you have questions, please do not hesitate to call me. I look forward to following Anna along with you.         Sincerely,        Gary Hernandez MD        CC: No Recipients    MATERNAL FETAL MEDICINE Consult Note    Dear Dr Jane Olvera DO:    Thank you for your kind referral of Anna Silva.  As you know, she is a 29 y.o.   23w2d gestation (Estimated Date of Delivery: 25). This is a consult.      Her antepartum course is complicated by:  - Abnormal fetal ultrasound, choroid plexus cyst    Aneuploidy Screening:  Carrier Screening:    HPI: She is a 29 year old  at 23w2d. In mid-March she had an anatomy scan in [facility] that revealed bilateral choroid plexus cysts.       Review of History:  Past Medical History:   Diagnosis Date   • Antepartum chorioamnionitis 2022   • Gestational hypertension    • History of cold sores    • Single delivery by  2022     Past Surgical History:   Procedure Laterality Date   •  SECTION N/A 2022    Procedure:  SECTION PRIMARY;  Surgeon: Jane Olvera DO;  Location: [facility] LABOR DELIVERY;  Service: Obstetrics/Gynecology;  Laterality: N/A;   • WISDOM TOOTH EXTRACTION         OB Hx:  OB History    Para Term  AB Living   2 1 1   1   SAB IAB Ectopic Molar Multiple Live Births       0 1      # Outcome Date GA Lbr Amilcar/2nd Weight Sex Type Anes PTL Lv   2 Current            1 Term 22 39w0d  3630 g (8 lb) F CS-LTranv EPI N LAY      Complications: Intraamniotic Infection, Postpartum Hemorrhage, Cephalopelvic Disproportion       Social History     Socioeconomic History   • Marital status:      Spouse name: Arvind Silva    •  Number of children: 0   • Years of education: 14   • Highest education level: Associate degree: occupational, technical, or vocational program   Tobacco Use   • Smoking status: Never     Passive exposure: Never   • Smokeless tobacco: Never   Vaping Use   • Vaping status: Never Used   Substance and Sexual Activity   • Alcohol use: Never   • Drug use: Never   • Sexual activity: Yes     Partners: Male     Birth control/protection: None     Family History   Problem Relation Age of Onset   • Hyperlipidemia Mother    • Hypertension Mother    • Hyperlipidemia Father    • Diabetes Father    • Diabetes Maternal Grandfather    • Diabetes Paternal Grandmother    • Alcohol abuse Neg Hx    • Arthritis Neg Hx    • Asthma Neg Hx    • Birth defects Neg Hx    • Bleeding Disorder Neg Hx    • Cancer Neg Hx    • COPD Neg Hx    • Depression Neg Hx    • Drug abuse Neg Hx    • Early death Neg Hx    • Hearing loss Neg Hx    • Heart disease Neg Hx    • Breast cancer Neg Hx    • Ovarian cancer Neg Hx    • Uterine cancer Neg Hx    • Colon cancer Neg Hx    • Melanoma Neg Hx    • Prostate cancer Neg Hx    • Deep vein thrombosis Neg Hx    • Pulmonary embolism Neg Hx       No Known Allergies   Current Outpatient Medications on File Prior to Visit   Medication Sig Dispense Refill   • aspirin 81 MG EC tablet Take 1 tablet by mouth Daily. 90 tablet 7   • Prenatal Vit-Fe Fumarate-FA (prenatal vitamin 27-0.8) 27-0.8 MG tablet tablet Take 1 tablet by mouth Daily.       No current facility-administered medications on file prior to visit.        Past obstetric, gynecological, medical, surgical, family and social history reviewed.  Relevant lab work and imaging reviewed.    Review of systems  Constitutional:  denies fever, chills, malaise.   ENT/Mouth:  denies sore throat, tinnitus  Eyes: denies vision changes/pain  CV:  denies chest pain  Respiratory:  denies cough/SOB  GI:  denies N/V, diarrhea, abdominal pain.    :   denies dysuria  Skin:  denies  "lesions or pruritus   Neuro:  denies weakness, focal neurologic symptoms    Vitals:    25 0915   BP: 120/75   BP Location: Right arm   Patient Position: Sitting   Pulse: 71   Temp: 97.7 °F (36.5 °C)   TempSrc: Temporal   SpO2: 100%   Weight: 64.8 kg (142 lb 12.8 oz)   Height: 157.5 cm (62\")       PHYSICAL EXAM   GENERAL: Not in acute distress, AAOx3, pleasant    LABS:   NIPT - Low risk male      ULTRASOUND   Please view full ultrasound note on Imaging tab in ViewPoint.  Breech -  bpm  Posterior left placenta, MVP 7cm (normal amniotic fluid)   grams (1 lb 3 oz), 32%, AC 28%  Fetal anatomy appears normal, choroid plexus cysts not seen. Suboptimal s-spine due to fetal position    ASSESSMENT/COUNSELIN y.o.   23w0d gestation (Estimated Date of Delivery: 25).     DISCUSSION:   CPCs are identified in approximately 1% to 2% of fetuses in the second trimester of pregnancy and are commonly isolated findings in normal fetuses. CPCs are associated with trisomy 18, as they are present in 30% to 50% of fetuses with this disorder.62 When a fetus is affected by trisomy 18, multiple structural anomalies are almost always evident, including structural heart defects, clenched hands, talipes deformity of the feet, FGR, and polyhydramnios. When a structural anomaly is present in addition to CPCs, there is a much higher likelihood of trisomy 18 or other chromosome anomalies.  Based on the literature, the best estimate for the Trisomy 18 LR is <2, suggesting a minimal risk. Given the T18 incidence of 1 in 3600 to 10,000 births, the chance for trisomy 18 in a normally grown fetus with isolated choroid plexus cyst(s) approximately 1 in 2000. The presence of a CPC does not alter the risk of trisomy 21.    A CPC is not considered a structural or functional brain abnormality, and nearly all CPCs resolve by 28 weeks.    Neurodevelopmental outcomes in children with euploidy born after a prenatal diagnosis of " CPCs have not shown differences in neurocognitive ability, motor function, or behavior.     For pregnant people with no previous aneuploidy screening and isolated CPCs - recommend counseling to estimate the probability of trisomy 18 and a discussion of options for non-invasive aneuploidy screening with cfDNA or quad screen if cfDNA is unavailable or cost-prohibitive.    - Although any patient may request diagnostic testing for any indication, we do not recommend diagnostic testing solely for this indication.     For pregnant people with negative serum or cfDNA screening results and isolated CPCs - recommend no further aneuploidy evaluation, as this finding is a normal variant of no clinical importance with no indication for follow-up ultrasound imaging or  evaluation.      -Pregnancy  [ X ] stable  [   ] improving [  ] worsening    Diagnoses and all orders for this visit:    1. Suspected fetal anomaly, antepartum, single or unspecified fetus (Primary)    2. Choroid plexus cyst       Summary of Plan  - Further evaluation for choroid plexus cysts is not necessary.  - Given the past history of gestation hypertension, a follow up growth scan should be done at approximately 30 weeks, then q 4 weeks thereafter   - the 30 weeks scan was scheduled with [facility] for reevaluation of anatomy   - Thereafter, the growth can be done in the primary office. But, we are happy continue if preferred.   -Starting at 28 weeks: Fetal movement instructions given continue daily until delivery; instructed to report to labor and delivery if cannot achieve more than 10 kicks in one hour or if she perceives a decrease in fetal movement    Thank you for the consult and opportunity to care for this patient.  Please feel free to reach out with any questions or concerns.      I spent 40 minutes caring for this patient on this date of service. This time includes time spent by me in the following activities: preparing for the visit, reviewing  tests, obtaining and/or reviewing a separately obtained history, performing a medically appropriate examination and/or evaluation, counseling and educating the patient/family/caregiver and independently interpreting results and communicating that information with the patient/family/caregiver with greater than 50% spent in counseling and coordination of care. This time did NOT include time for interpretation of imaging or electronic fetal monitoring.     Gary Hernandez MD FACOG  Maternal Fetal Medicine[facility]  Office: [phone]  [email or web address]          Pt reports that she is doing well and denies vaginal bleeding, cramping, contractions or LOF at this time. Reports active fetal movement. Reviewed when to call OB office or present to L&D for evaluation with symptoms such as decreased fetal movement, vaginal bleeding, LOF or ctxs. Pt verbalized understanding. Denies HA, visual changes or epigastric pain. Denies any additional complaints at time of appointment. Next OB appointment scheduled for 4/14.    Vitals:    04/04/25 0915   BP: 120/75   Pulse: 71   Temp: 97.7 °F (36.5 °C)   SpO2: 100%

## 2025-04-11 NOTE — PROGRESS NOTES
OB FOLLOW UP      Chief Complaint   Patient presents with    Routine Prenatal Visit     Subjective:   No complaints.  Denies VB, leaking fluid, current regular cramping or contractions.    Objective:  /71   Wt 65.8 kg (145 lb)   LMP 10/31/2024   BMI 26.52 kg/m²  6.804 kg (15 lb) Body mass index is 26.52 kg/m².  Chaperone present during pelvic exam if performed.  See OB flow sheet for fundal height (not performed if US day of OV), FHT, edema, cvx exam if performed, and Upro/Uglu.       Assessment and Plan:  24w5d     Diagnoses and all orders for this visit:    1. Supervision of other normal pregnancy, antepartum (Primary)  Overview:  COLLINS finalized: 25 per 14week US and per ACOG    Declined CF.  NIPS low risk XY.  Declined AFP    COVID: Recommended  Flu: Vaccinated for - season  Tdap:  Recommended, s/p Rx 2025     1hr Glucola: 2025   FU RPR w glucola: 2025   ? Desires Sterilization: Considering 2025     Anatomy US: [facility] 3/17/25 cwd, post placenta, CPC bilat.  Boy.   FU US: [facility] 25 breech, 1 pound 3 ounce, 32%, no choroid plexus cyst seen  FU US:  fu ordered 2025     PROBLEM LIST/PLAN:   Hx of GHTN - baseline labs normal, UPC 0.08, ASA 81 mg- continue   MFM recommends growth ultrasound at 30 weeks and then every 4 weeks afterwards  Hx  w PPH - G1 deep transverse arrest, chorio, 1200ebl, Blynch stitch.  No blood transfusion.  Recommend RCS.  Op Note by Jane Olvera DO (2022 09:44)  Hx cold sores- plan prophylaxis 34-36weeks    Bilat CPC- not seen w MFM    Orders:  -     POC Urinalysis Dipstick  -     Gestational Diabetes Screen 1 Hour; Future  -     CBC (No Diff); Future  -     Treponema pallidum AB w/Reflex RPR  -     US Ob 14 + Weeks Single or First Gestation; Future      Counseling:    OB precautions, leaking, VB, mare zacarias vs PTL/Labor  FKC    Reassuring pregnancy progress. Continue PNV.      Return in about 3 weeks (around 2025) for FU OB then  [id number] x2.            Jane Olvera,   04/14/2025    [facility] [address] [facility]  [address] [place] [address] [address] [place] [address]  Dept: [phone]  Dept Fax: [phone]  Loc: [phone]  Loc Fax: [phone]

## 2025-04-12 PROBLEM — O35.9XX0 SUSPECTED FETAL ANOMALY, ANTEPARTUM: Status: RESOLVED | Noted: 2025-04-04 | Resolved: 2025-04-12

## 2025-04-12 PROBLEM — G93.0 CHOROID PLEXUS CYST: Status: RESOLVED | Noted: 2025-03-17 | Resolved: 2025-04-12

## 2025-04-14 ENCOUNTER — RESULTS FOLLOW-UP (OUTPATIENT)
Dept: OBSTETRICS AND GYNECOLOGY | Age: 30
End: 2025-04-14

## 2025-04-14 ENCOUNTER — ROUTINE PRENATAL (OUTPATIENT)
Dept: OBSTETRICS AND GYNECOLOGY | Age: 30
End: 2025-04-14
Payer: COMMERCIAL

## 2025-04-14 VITALS — WEIGHT: 145 LBS | BODY MASS INDEX: 26.52 KG/M2 | DIASTOLIC BLOOD PRESSURE: 71 MMHG | SYSTOLIC BLOOD PRESSURE: 114 MMHG

## 2025-04-14 DIAGNOSIS — R73.09 ELEVATED GLUCOSE TOLERANCE TEST: Primary | ICD-10-CM

## 2025-04-14 DIAGNOSIS — Z34.80 SUPERVISION OF OTHER NORMAL PREGNANCY, ANTEPARTUM: Primary | ICD-10-CM

## 2025-04-14 LAB
DEPRECATED RDW RBC AUTO: 42.4 FL (ref 37–54)
ERYTHROCYTE [DISTWIDTH] IN BLOOD BY AUTOMATED COUNT: 12.9 % (ref 12.3–15.4)
GLUCOSE 1H P GLC SERPL-MCNC: 130 MG/DL (ref 65–139)
GLUCOSE UR STRIP-MCNC: NEGATIVE MG/DL
HCT VFR BLD AUTO: 33.5 % (ref 34–46.6)
HGB BLD-MCNC: 11.4 G/DL (ref 12–15.9)
MCH RBC QN AUTO: 30.6 PG (ref 26.6–33)
MCHC RBC AUTO-ENTMCNC: 34 G/DL (ref 31.5–35.7)
MCV RBC AUTO: 90.1 FL (ref 79–97)
PLATELET # BLD AUTO: 157 10*3/MM3 (ref 140–450)
PMV BLD AUTO: 10.7 FL (ref 6–12)
PROT UR STRIP-MCNC: NEGATIVE MG/DL
RBC # BLD AUTO: 3.72 10*6/MM3 (ref 3.77–5.28)
TREPONEMA PALLIDUM IGG+IGM AB [PRESENCE] IN SERUM OR PLASMA BY IMMUNOASSAY: NORMAL
WBC NRBC COR # BLD AUTO: 8.26 10*3/MM3 (ref 3.4–10.8)

## 2025-04-14 PROCEDURE — 82950 GLUCOSE TEST: CPT | Performed by: OBSTETRICS & GYNECOLOGY

## 2025-04-14 PROCEDURE — 86780 TREPONEMA PALLIDUM: CPT | Performed by: OBSTETRICS & GYNECOLOGY

## 2025-04-14 PROCEDURE — 85027 COMPLETE CBC AUTOMATED: CPT | Performed by: OBSTETRICS & GYNECOLOGY

## 2025-04-15 PROBLEM — R89.9 ABNORMAL LABORATORY TEST: Status: ACTIVE | Noted: 2025-04-15

## 2025-04-16 ENCOUNTER — TELEPHONE (OUTPATIENT)
Dept: OBSTETRICS AND GYNECOLOGY | Age: 30
End: 2025-04-16
Payer: COMMERCIAL

## 2025-04-16 NOTE — TELEPHONE ENCOUNTER
Result Note 1 (CBC) from Dr Olvera:  Blood counts are normal consistent with pregnancy.  Platelets are normal but borderline low.  She did have low platelets with her first pregnancy.  I would recommend we recheck this level at 32-36 weeks.    Result Note 2 (Gestational Diabetes Screen 1 Hour) from Dr Olvera:  Elevated 1hr PG (130 or higher is ABNORMAL even if not flagged as such on Epic).    3hr GTT has been ordered.  Please schedule in the next week.    Please instruct on appropriate diet prior and expectations for 3hr glucola test.     Left a message for the patient to discuss her results.

## 2025-04-18 NOTE — TELEPHONE ENCOUNTER
Informed of her results and recommendations. Patient voiced understanding of instructions for testing. Appointment scheduled 4/2525 for 3 hour GTT.

## 2025-04-25 ENCOUNTER — RESULTS FOLLOW-UP (OUTPATIENT)
Facility: HOSPITAL | Age: 30
End: 2025-04-25
Payer: COMMERCIAL

## 2025-04-25 ENCOUNTER — LAB (OUTPATIENT)
Facility: HOSPITAL | Age: 30
End: 2025-04-25
Payer: COMMERCIAL

## 2025-04-25 ENCOUNTER — TELEPHONE (OUTPATIENT)
Dept: OBSTETRICS AND GYNECOLOGY | Age: 30
End: 2025-04-25
Payer: COMMERCIAL

## 2025-04-25 DIAGNOSIS — R73.09 ELEVATED GLUCOSE TOLERANCE TEST: ICD-10-CM

## 2025-04-25 LAB
GLUCOSE BLDC GLUCOMTR-MCNC: 91 MG/DL (ref 70–99)
GLUCOSE P FAST SERPL-MCNC: 92 MG/DL (ref 65–94)
GTT GEST 2H PNL UR+SERPL: 136 MG/DL (ref 65–179)
GTT GEST 3H PNL SERPL: 117 MG/DL (ref 65–154)
GTT GEST 3H PNL SERPL: 45 MG/DL (ref 65–139)

## 2025-04-25 PROCEDURE — 36415 COLL VENOUS BLD VENIPUNCTURE: CPT

## 2025-04-25 PROCEDURE — 82951 GLUCOSE TOLERANCE TEST (GTT): CPT

## 2025-04-25 PROCEDURE — 82952 GTT-ADDED SAMPLES: CPT

## 2025-04-25 NOTE — TELEPHONE ENCOUNTER
Pt did 3 hour glucose tolerance test this morning. Lab results have come back showing a critical value for the last hour. Called pt to check and make sure she was feeling well. Pt stated that she felt shaky after the test, but went and got something to eat. She now feels much better.

## 2025-04-30 NOTE — PROGRESS NOTES
OB FOLLOW UP      Chief Complaint   Patient presents with    Routine Prenatal Visit     Subjective:   Denies VB, leaking fluid, current regular cramping or contractions.    Leg cramps come and go    Objective:  /72   Wt 67.1 kg (148 lb)   LMP 10/31/2024   BMI 27.07 kg/m²  8.165 kg (18 lb) Body mass index is 27.07 kg/m².  Chaperone present during pelvic exam if performed.  See OB flow sheet for fundal height (not performed if US day of OV), FHT, edema, cvx exam if performed, and Upro/Uglu.       Assessment and Plan:  27w5d     Diagnoses and all orders for this visit:    1. Supervision of other normal pregnancy, antepartum (Primary)  Overview:  COLLINS finalized: 25 per 14week US and per ACOG    Declined CF.  NIPS low risk XY.  Declined AFP    COVID: Recommended, declines  Flu: Vaccinated for 24-25 season  Tdap:  Vaccinated 2025     1hr Glucola: 130, 3hr all wnl  FU RPR w glucola: Negative  ? Desires Sterilization: Declines 2025     Anatomy US: [facility] 3/17/25 cwd, post placenta, CPC bilat.  Boy.   FU US: [facility] 25 breech, 1 pound 3 ounce, 32%, no choroid plexus cyst seen  FU US:  [facility] 25    PROBLEM LIST/PLAN:   Hx of GHTN - baseline labs normal, UPC 0.08, ASA 81 mg- continue   [facility] recommends growth ultrasound at 30 weeks and then every 4 weeks afterwards  Hx  w PPH - G1 deep transverse arrest, chorio, 1200ebl, Blynch stitch.  No blood transfusion.  Recommend RCS.  Op Note by Jane Olvera DO (2022 09:44)  Hx cold sores- plan prophylaxis 34-36weeks    Bilat CPC- not seen w M    Orders:  -     POC Urinalysis Dipstick    2. Need for Tdap vaccination  -     Tdap Vaccine => 8yo IM (BOOSTRIX/ADACEL)      Counseling:    OB precautions, leaking, VB, mare zacarias vs PTL/Labor  FKC  LEG MUSCLE CRAMPS reviewed conservative options to include increasing potassium in diet, over-the-counter magnesium supplementation and resetting muscle spindles with dany into the cramp.    Reassuring  pregnancy progress. Continue PNV.      Return in about 2 weeks (around 5/19/2025) for FU OB q2wks to 36weeks.            Jane Olvera, DO  05/05/2025    [facility] [address] [facility]  [address] [place] [address] [address] [place] [address]  Dept: [phone]  Dept Fax: [phone]  Loc: [phone]  Loc Fax: [phone]

## 2025-05-05 ENCOUNTER — ROUTINE PRENATAL (OUTPATIENT)
Dept: OBSTETRICS AND GYNECOLOGY | Age: 30
End: 2025-05-05
Payer: COMMERCIAL

## 2025-05-05 VITALS — DIASTOLIC BLOOD PRESSURE: 72 MMHG | SYSTOLIC BLOOD PRESSURE: 116 MMHG | BODY MASS INDEX: 27.07 KG/M2 | WEIGHT: 148 LBS

## 2025-05-05 DIAGNOSIS — Z23 NEED FOR TDAP VACCINATION: ICD-10-CM

## 2025-05-05 DIAGNOSIS — Z34.80 SUPERVISION OF OTHER NORMAL PREGNANCY, ANTEPARTUM: Primary | ICD-10-CM

## 2025-05-05 LAB
GLUCOSE UR STRIP-MCNC: NEGATIVE MG/DL
PROT UR STRIP-MCNC: NEGATIVE MG/DL

## 2025-05-05 PROCEDURE — 90715 TDAP VACCINE 7 YRS/> IM: CPT | Performed by: OBSTETRICS & GYNECOLOGY

## 2025-05-05 PROCEDURE — 90471 IMMUNIZATION ADMIN: CPT | Performed by: OBSTETRICS & GYNECOLOGY

## 2025-05-05 PROCEDURE — 0502F SUBSEQUENT PRENATAL CARE: CPT | Performed by: OBSTETRICS & GYNECOLOGY

## 2025-05-16 NOTE — PROGRESS NOTES
OB FOLLOW UP      Chief Complaint   Patient presents with    Routine Prenatal Visit     Subjective:   No complaints.  Denies VB, leaking fluid, current regular cramping or contractions.    Objective:  /72   Wt 68.9 kg (151 lb 12.8 oz)   LMP 10/31/2024   BMI 27.76 kg/m²  9.888 kg (21 lb 12.8 oz) Body mass index is 27.76 kg/m².  Chaperone present during pelvic exam if performed.  See OB flow sheet for fundal height (not performed if US day of OV), FHT, edema, cvx exam if performed, and Upro/Uglu.       Assessment and Plan:  29w5d     Diagnoses and all orders for this visit:    1. Supervision of other normal pregnancy, antepartum (Primary)  Overview:  COLLINS finalized: 25 per 14week US and per ACOG    Declined CF.  NIPS low risk XY.  Declined AFP    COVID: Recommended, declines  Flu: Vaccinated for 24-25 season  Tdap:  Vaccinated     1hr Glucola: 130, 3hr all wnl  FU RPR w glucola: Negative  ? Desires Sterilization: Declined    Anatomy US: [facility] 3/17/25 cwd, post placenta, CPC bilat.  Boy.   FU US: [facility] 25 breech, 1 pound 3 ounce, 32%, no choroid plexus cyst seen  FU US:  [facility] 25 3#4oz, 43%, AC 60%, JULIETA 21, gd1 placenta  FU US: ordered 2025     PROBLEM LIST/PLAN:   Hx of GHTN - baseline labs normal, UPC 0.08, ASA 81 mg- continue   [facility] recommends growth ultrasound at 30 weeks and then every 4 weeks afterwards  Hx  w PPH - G1 deep transverse arrest, chorio, 1200ebl, Blynch stitch.  No blood transfusion.  Recommend RCS.  Op Note by Jane Olvera DO (2022 09:44)  Hx cold sores- plan prophylaxis 34-36weeks    Bilat CPC- not seen w [facility]    Orders:  -     POC Urinalysis Dipstick  -     US Ob 14 + Weeks Single or First Gestation; Future    2. Borderline platelets at Glucola and history of low platelets with G1  Overview:  Recheck at 32-36 weeks        Counseling:    OB precautions, leaking, VB, mare zacarias vs PTL/Labor  FKC    Reassuring pregnancy progress. Continue PNV.      Return in  about 2 weeks (around 6/2/2025) for FU OB q2wks to 36weeks, US growth 4weeks.            Jane Olvera, DO  05/19/2025    [facility] [address] [facility]  [address] [place] [address] [address] [place] [address]  Dept: [phone]  Dept Fax: [phone]  Loc: [phone]  Loc Fax: [phone]

## 2025-05-19 ENCOUNTER — ROUTINE PRENATAL (OUTPATIENT)
Dept: OBSTETRICS AND GYNECOLOGY | Age: 30
End: 2025-05-19
Payer: COMMERCIAL

## 2025-05-19 VITALS — SYSTOLIC BLOOD PRESSURE: 107 MMHG | BODY MASS INDEX: 27.76 KG/M2 | WEIGHT: 151.8 LBS | DIASTOLIC BLOOD PRESSURE: 72 MMHG

## 2025-05-19 DIAGNOSIS — Z34.80 SUPERVISION OF OTHER NORMAL PREGNANCY, ANTEPARTUM: Primary | ICD-10-CM

## 2025-05-19 DIAGNOSIS — R89.9 ABNORMAL LABORATORY TEST: ICD-10-CM

## 2025-05-19 LAB
GLUCOSE UR STRIP-MCNC: NEGATIVE MG/DL
PROT UR STRIP-MCNC: NEGATIVE MG/DL

## 2025-05-30 NOTE — PROGRESS NOTES
OB FOLLOW UP      Chief Complaint   Patient presents with    Routine Prenatal Visit     Subjective:   No complaints.  Denies VB, leaking fluid, current regular cramping or contractions.    Objective:  /76   Wt 69.4 kg (153 lb)   LMP 10/31/2024   BMI 27.98 kg/m²  10.4 kg (23 lb) Body mass index is 27.98 kg/m².  Chaperone present during pelvic exam if performed.  See OB flow sheet for fundal height (not performed if US day of OV), FHT, edema, cvx exam if performed, and Upro/Uglu.       Assessment and Plan:  31w5d     Diagnoses and all orders for this visit:    1. Supervision of other normal pregnancy, antepartum (Primary)  Overview:  COLLINS finalized: 25 per 14week US and per ACOG    Declined CF.  NIPS low risk XY.  Declined AFP    COVID: Recommended, declines  Flu: Vaccinated for 24- season  Tdap:  Vaccinated     1hr Glucola: 130, 3hr all wnl  FU RPR w glucola: Negative  ? Desires Sterilization: Declined    Anatomy US: [facility] 3/17/25 cwd, post placenta, CPC bilat.  Boy.   FU US: [facility] 25 breech, 1 pound 3 ounce, 32%, no choroid plexus cyst seen  FU US:  [facility] 25 3#4oz, 43%, AC 60%, JULIETA 21, gd1 placenta  FU US: [facility] 25    PROBLEM LIST/PLAN:   Hx of GHTN - baseline labs normal, UPC 0.08, ASA 81 mg- continue   [facility] recommends growth ultrasound at 30 weeks and then every 4 weeks afterwards  Hx  w PPH - G1 deep transverse arrest, chorio, 1200ebl, Blynch stitch.  No blood transfusion.  Recommend RCS.  Op Note by Jane Olvera DO (2022 09:44)  Hx cold sores- plan prophylaxis 34-36weeks    Bilat CPC- not seen w [facility]    Orders:  -     POC Urinalysis Dipstick    2. Borderline platelets at Glucola and history of low platelets with G1  Overview:  Recheck at 32-36 weeks        Counseling:    OB precautions, leaking, VB, mare zacarias vs PTL/Labor  FKC    Reassuring pregnancy progress. Continue PNV.      Return in about 2 weeks (around 2025) for FU OB q2wks to 36weeks then weekly to  erica Olvera,   06/02/2025    [facility] [address] [facility]  [address] [place] [address] [address] [place] [address]  Dept: [phone]  Dept Fax: [phone]  Loc: [phone]  Loc Fax: [phone]

## 2025-06-02 ENCOUNTER — ROUTINE PRENATAL (OUTPATIENT)
Dept: OBSTETRICS AND GYNECOLOGY | Age: 30
End: 2025-06-02
Payer: COMMERCIAL

## 2025-06-02 VITALS — WEIGHT: 153 LBS | BODY MASS INDEX: 27.98 KG/M2 | SYSTOLIC BLOOD PRESSURE: 115 MMHG | DIASTOLIC BLOOD PRESSURE: 76 MMHG

## 2025-06-02 DIAGNOSIS — Z34.80 SUPERVISION OF OTHER NORMAL PREGNANCY, ANTEPARTUM: Primary | ICD-10-CM

## 2025-06-02 DIAGNOSIS — R89.9 ABNORMAL LABORATORY TEST: ICD-10-CM

## 2025-06-02 LAB
GLUCOSE UR STRIP-MCNC: NEGATIVE MG/DL
PROT UR STRIP-MCNC: NEGATIVE MG/DL

## 2025-06-02 PROCEDURE — 0502F SUBSEQUENT PRENATAL CARE: CPT | Performed by: OBSTETRICS & GYNECOLOGY

## 2025-06-12 NOTE — PROGRESS NOTES
OB FOLLOW UP      Chief Complaint   Patient presents with    Routine Prenatal Visit     Subjective:   Anna Silva is a 29 y.o.  33w5d patient being seen today for her routine obstetrical follow up visit.     No complaints.  Denies VB, leaking fluid, current regular cramping or contractions.    Objective:  /66   Wt 69.4 kg (153 lb)   LMP 10/31/2024   BMI 27.98 kg/m²  10.4 kg (23 lb) Body mass index is 27.98 kg/m².  Chaperone present during pelvic exam if performed.  See OB flow sheet for fundal height (not performed if US day of OV), FHT, edema, cvx exam if performed, and Upro/Uglu.       Assessment and Plan:  33w5d     Diagnoses and all orders for this visit:    1. Supervision of other normal pregnancy, antepartum (Primary)  Overview:  COLLINS finalized: 25 per 14week US and per ACOG    Declined CF.  NIPS low risk XY.  Declined AFP    COVID: Recommended, declines  Flu: Vaccinated for -25 season  Tdap:  Vaccinated     1hr Glucola: 130, 3hr all wnl  FU RPR w glucola: Negative  ? Desires Sterilization: Declined    Anatomy US: [facility] 3/17/25 cwd, post placenta, CPC bilat.  Boy.   FU US: [facility] 25 breech, 1 pound 3 ounce, 32%, no choroid plexus cyst seen  FU US:  [facility] 25 3#4oz, 43%, AC 60%, JULIETA 21, gd1 placenta  FU US: [facility] 25 5#2oz, 49%, AC 67%, JULIETA 17.9, gd2 placenta, variable presentation    PROBLEM LIST/PLAN:   Hx of GHTN - baseline labs normal, UPC 0.08, ASA 81 mg- continue   [facility] recommends growth ultrasound at 30 weeks and then every 4 weeks afterwards- done  Hx  w PPH - G1 deep transverse arrest, chorio, 1200ebl, Blynch stitch.  No blood transfusion.  Recommend RCS.  Op Note by Jane Olvera DO (2022 09:44)  Hx cold sores- plan prophylaxis 34-36weeks, ordered 2025     Bilat CPC- not seen w MFM    Orders:  -     POC Urinalysis Dipstick    2. History of cold sores  -     valACYclovir (VALTREX) 1000 MG tablet; Take 1 tablet by mouth Daily.  Dispense: 30 tablet;  Refill: 1    3. Borderline platelets at Glucola and history of low platelets with G1  Overview:  Recheck at 32-36 weeks    Orders:  -     CBC (No Diff)      Counseling:    OB precautions, leaking, VB, mare zacarias vs PTL/Labor  FKC    Reassuring pregnancy progress. Continue PNV.      Return in about 2 weeks (around 6/30/2025) for FU OB, then q1week to COLLINS.            Jaen Olvera,   06/16/2025    [facility] [address] [place] [address]  Dept: [phone]  Dept Fax: [phone]  Loc: [phone]  Loc Fax: [phone]

## 2025-06-16 ENCOUNTER — ROUTINE PRENATAL (OUTPATIENT)
Dept: OBSTETRICS AND GYNECOLOGY | Age: 30
End: 2025-06-16
Payer: COMMERCIAL

## 2025-06-16 VITALS — DIASTOLIC BLOOD PRESSURE: 66 MMHG | SYSTOLIC BLOOD PRESSURE: 118 MMHG | BODY MASS INDEX: 27.98 KG/M2 | WEIGHT: 153 LBS

## 2025-06-16 DIAGNOSIS — R89.9 ABNORMAL LABORATORY TEST: ICD-10-CM

## 2025-06-16 DIAGNOSIS — Z86.19 HISTORY OF COLD SORES: ICD-10-CM

## 2025-06-16 DIAGNOSIS — Z34.80 SUPERVISION OF OTHER NORMAL PREGNANCY, ANTEPARTUM: Primary | ICD-10-CM

## 2025-06-16 PROBLEM — O32.0XX0 UNSTABLE LIE OF FETUS: Status: ACTIVE | Noted: 2025-06-16

## 2025-06-16 LAB
DEPRECATED RDW RBC AUTO: 40.5 FL (ref 37–54)
ERYTHROCYTE [DISTWIDTH] IN BLOOD BY AUTOMATED COUNT: 12.1 % (ref 12.3–15.4)
GLUCOSE UR STRIP-MCNC: NEGATIVE MG/DL
HCT VFR BLD AUTO: 36.4 % (ref 34–46.6)
HGB BLD-MCNC: 11.5 G/DL (ref 12–15.9)
MCH RBC QN AUTO: 28.9 PG (ref 26.6–33)
MCHC RBC AUTO-ENTMCNC: 31.6 G/DL (ref 31.5–35.7)
MCV RBC AUTO: 91.5 FL (ref 79–97)
PLATELET # BLD AUTO: 157 10*3/MM3 (ref 140–450)
PMV BLD AUTO: 10.8 FL (ref 6–12)
PROT UR STRIP-MCNC: NEGATIVE MG/DL
RBC # BLD AUTO: 3.98 10*6/MM3 (ref 3.77–5.28)
WBC NRBC COR # BLD AUTO: 7.69 10*3/MM3 (ref 3.4–10.8)

## 2025-06-16 PROCEDURE — 85027 COMPLETE CBC AUTOMATED: CPT | Performed by: OBSTETRICS & GYNECOLOGY

## 2025-06-16 RX ORDER — VALACYCLOVIR HYDROCHLORIDE 1 G/1
1000 TABLET, FILM COATED ORAL DAILY
Qty: 30 TABLET | Refills: 1 | Status: SHIPPED | OUTPATIENT
Start: 2025-06-16

## 2025-06-30 ENCOUNTER — ROUTINE PRENATAL (OUTPATIENT)
Dept: OBSTETRICS AND GYNECOLOGY | Age: 30
End: 2025-06-30
Payer: COMMERCIAL

## 2025-06-30 VITALS — SYSTOLIC BLOOD PRESSURE: 126 MMHG | BODY MASS INDEX: 28.35 KG/M2 | DIASTOLIC BLOOD PRESSURE: 87 MMHG | WEIGHT: 155 LBS

## 2025-06-30 DIAGNOSIS — Z34.80 SUPERVISION OF OTHER NORMAL PREGNANCY, ANTEPARTUM: Primary | ICD-10-CM

## 2025-06-30 DIAGNOSIS — Z3A.35 35 WEEKS GESTATION OF PREGNANCY: ICD-10-CM

## 2025-06-30 LAB
GLUCOSE UR STRIP-MCNC: NEGATIVE MG/DL
PROT UR STRIP-MCNC: NEGATIVE MG/DL

## 2025-06-30 PROCEDURE — 87653 STREP B DNA AMP PROBE: CPT | Performed by: NURSE PRACTITIONER

## 2025-06-30 NOTE — PROGRESS NOTES
OB FOLLOW UP        Chief Complaint   Patient presents with    Routine Prenatal Visit       Subjective:   No complaints.  Denies VB, leaking fluid, current regular cramping or contractions.    Objective:  /87   Wt 70.3 kg (155 lb)   LMP 10/31/2024   BMI 28.35 kg/m²   Uterine Size: size equals dates  FHT: 110-160 BPM  GBS RV swab performed today  See OB flow for LE edema, cvx exam if performed, and Upro/Uglu    Assessment and Plan:  35w5d  Reassuring pregnancy progress.  Questions answered.  Diagnoses and all orders for this visit:    1. Supervision of other normal pregnancy, antepartum (Primary)  Overview:  COLLINS finalized: 25 per 14week US and per ACOG    Declined CF.  NIPS low risk XY.  Declined AFP    COVID: Recommended, declines  Flu: Vaccinated for -25 season  Tdap:  Vaccinated     1hr Glucola: 130, 3hr all wnl  FU RPR w glucola: Negative  ? Desires Sterilization: Declined    Anatomy US: [facility] 3/17/25 cwd, post placenta, CPC bilat.  Boy.   FU US: MFM 25 breech, 1 pound 3 ounce, 32%, no choroid plexus cyst seen  FU US:  [facility] 25 3#4oz, 43%, AC 60%, JULIETA 21, gd1 placenta  FU US: [facility] 25 5#2oz, 49%, AC 67%, JULIETA 17.9, gd2 placenta, variable presentation    PROBLEM LIST/PLAN:   Hx of GHTN - baseline labs normal, UPC 0.08, ASA 81 mg- continue   [facility] recommends growth ultrasound at 30 weeks and then every 4 weeks afterwards- done  Hx  w PPH - G1 deep transverse arrest, chorio, 1200ebl, Blynch stitch.  No blood transfusion.  Recommend RCS.  Op Note by Jane Olvera DO (2022 09:44)  Hx cold sores- plan prophylaxis 34-36weeks, ordered 2025     Bilat CPC- not seen w [facility]    Assessment & Plan:  Doing well, no complaints  Reports good fetal movement  Fetal kick counts   labor precautions    Orders:  -     POC Urinalysis Dipstick  -     Group B Strep Molecular by PCR - Swab, Vaginal/Rectum    2. 35 weeks gestation of pregnancy      Counseling:    OB precautions, leaking, VB,  mare zacarias vs PTL/Labor  FKC  Continue PNV.  Importance of healthy eating and exercise.    Return in about 1 week (around 7/7/2025) for OB follow up, Dr. Olvera.        Rashaun Nielson, APRN  06/30/2025    [facility]  [facility]  [address] [place] [address] [address] [place] [address]  Dept: [phone]  Dept Fax: [phone]  Loc: [phone]

## 2025-07-01 LAB — GP B STREP DNA VAG+RECTUM QL NAA+PROBE: POSITIVE

## 2025-07-02 PROBLEM — O99.820 GBS (GROUP B STREPTOCOCCUS CARRIER), +RV CULTURE, CURRENTLY PREGNANT: Status: ACTIVE | Noted: 2025-07-02

## 2025-07-02 NOTE — PROGRESS NOTES
OB FOLLOW UP      Chief Complaint   Patient presents with    Routine Prenatal Visit     Subjective:   Anna Silva is a 29 y.o.  36w5d patient being seen today for her routine obstetrical follow up visit.     No complaints.  Denies VB, leaking fluid, current regular cramping or contractions.    Objective:  /79   Wt 71.2 kg (157 lb)   LMP 10/31/2024   BMI 28.72 kg/m²  12.2 kg (27 lb) Body mass index is 28.72 kg/m².  Chaperone present during pelvic exam if performed.  See OB flow sheet for fundal height (not performed if US day of OV), FHT, edema, cvx exam if performed, and Upro/Uglu.       Assessment and Plan:  36w5d     Diagnoses and all orders for this visit:    1. Supervision of other normal pregnancy, antepartum (Primary)  Overview:  COLLINS finalized: 25 per 14week US and per ACOG    Declined CF.  NIPS low risk XY.  Declined AFP    COVID: Recommended, declines  Flu: Vaccinated for -25 season  Tdap:  Vaccinated     1hr Glucola: 130, 3hr all wnl  FU RPR w glucola: Negative  ? Desires Sterilization: Declined    Anatomy US: [facility] 3/17/25 cwd, post placenta, CPC bilat.  Boy.   FU US: MFM 25 breech, 1 pound 3 ounce, 32%, no choroid plexus cyst seen  FU US:  [facility] 25 3#4oz, 43%, AC 60%, JULIETA 21, gd1 placenta  FU US: [facility] 25 5#2oz, 49%, AC 67%, JULIETA 17.9, gd2 placenta, variable presentation    PROBLEM LIST/PLAN:   Hx of GHTN - baseline labs normal, UPC 0.08, ASA 81 mg- continue   MFM recommends growth ultrasound at 30 weeks and then every 4 weeks afterwards- done  Hx  w PPH - G1 deep transverse arrest, chorio, 1200ebl, Blynch stitch.  No blood transfusion.  Recommend RCS.  Op Note by Jane Olvera DO (2022 09:44)  Hx cold sores- plan prophylaxis 34-36weeks, Valtrex, continue    Bilat CPC- not seen w MFM    Orders:  -     POC Urinalysis Dipstick      Counseling:    OB precautions, leaking, VB, mare zacarias vs PTL/Labor  FKC    Reassuring pregnancy progress. Continue PNV.       Return in about 1 week (around 7/14/2025) for FU OB q1wk to COLLINS/Delivery.            Jane Olvera,   07/07/2025    [facility] [address] [facility]  [address] [place] [address] [address] [place] [address]  Dept: [phone]  Dept Fax: [phone]  Loc: [phone]  Loc Fax: [phone]

## 2025-07-07 ENCOUNTER — ROUTINE PRENATAL (OUTPATIENT)
Dept: OBSTETRICS AND GYNECOLOGY | Age: 30
End: 2025-07-07
Payer: COMMERCIAL

## 2025-07-07 VITALS — WEIGHT: 157 LBS | BODY MASS INDEX: 28.72 KG/M2 | SYSTOLIC BLOOD PRESSURE: 115 MMHG | DIASTOLIC BLOOD PRESSURE: 79 MMHG

## 2025-07-07 DIAGNOSIS — Z34.80 SUPERVISION OF OTHER NORMAL PREGNANCY, ANTEPARTUM: Primary | ICD-10-CM

## 2025-07-07 LAB
GLUCOSE UR STRIP-MCNC: NEGATIVE MG/DL
PROT UR STRIP-MCNC: NEGATIVE MG/DL

## 2025-07-11 NOTE — PROGRESS NOTES
OB FOLLOW UP      Chief Complaint   Patient presents with    Routine Prenatal Visit     Subjective:   Anna Silva is a 30 y.o.  37w5d patient being seen today for her routine obstetrical follow up visit.     No complaints.  Denies VB, leaking fluid, current regular cramping or contractions.    Objective:  /85   Wt 72.1 kg (159 lb)   LMP 10/31/2024   BMI 29.08 kg/m²  Body mass index is 29.08 kg/m².  Chaperone present during pelvic exam if performed.  See OB flow sheet for fundal height (not performed if US day of OV), FHT, edema, cvx exam if performed, and Upro/Uglu.       Assessment and Plan:  37w5d     Diagnoses and all orders for this visit:    1. Supervision of other normal pregnancy, antepartum (Primary)  Overview:  COLLINS finalized: 25 per 14week US and per ACOG    Declined CF.  NIPS low risk XY.  Declined AFP    COVID: Recommended, declines  Flu: Vaccinated for 24-25 season  Tdap:  Vaccinated     1hr Glucola: 130, 3hr all wnl  FU RPR w glucola: Negative  ? Desires Sterilization: Declined    Anatomy US: [facility] 3/17/25 cwd, post placenta, CPC bilat.  Boy.   FU US: MFM 25 breech, 1 pound 3 ounce, 32%, no choroid plexus cyst seen  FU US:  [facility] 25 3#4oz, 43%, AC 60%, JULIETA 21, gd1 placenta  FU US: [facility] 25 5#2oz, 49%, AC 67%, JULIETA 17.9, gd2 placenta, variable presentation    PROBLEM LIST/PLAN:   Hx of GHTN - baseline labs normal, UPC 0.08, ASA 81 mg- continue   MFM recommends growth ultrasound at 30 weeks and then every 4 weeks afterwards- done  Hx  w PPH - G1 deep transverse arrest, chorio, 1200ebl, Blynch stitch.  No blood transfusion.  Recommend RCS.  Op Note by Jane Olvera DO (2022 09:44)  Hx cold sores- Valtrex 34-36weeks,  continue    Bilat CPC- not seen w MFM    Orders:  -     POC Urinalysis Dipstick      Counseling:    OB precautions, leaking, VB, mare zacarias vs PTL/Labor  FKC    Reassuring pregnancy progress. Continue PNV.      Return for 1week OB FU and  28Jul FU OB w me.            Jane Olvera, DO  07/14/2025    [facility] [place] [facility] [address] [facility]  [address] [place] [address] [address] [place] [address]  Dept: [phone]  Dept Fax: [phone]  Loc: [phone]  Loc Fax: [phone]

## 2025-07-14 ENCOUNTER — ROUTINE PRENATAL (OUTPATIENT)
Dept: OBSTETRICS AND GYNECOLOGY | Age: 30
End: 2025-07-14
Payer: COMMERCIAL

## 2025-07-14 VITALS — SYSTOLIC BLOOD PRESSURE: 128 MMHG | DIASTOLIC BLOOD PRESSURE: 85 MMHG | WEIGHT: 159 LBS | BODY MASS INDEX: 29.08 KG/M2

## 2025-07-14 DIAGNOSIS — Z34.80 SUPERVISION OF OTHER NORMAL PREGNANCY, ANTEPARTUM: Primary | ICD-10-CM

## 2025-07-14 LAB
GLUCOSE UR STRIP-MCNC: NEGATIVE MG/DL
PROT UR STRIP-MCNC: NEGATIVE MG/DL

## 2025-07-16 ENCOUNTER — TELEPHONE (OUTPATIENT)
Dept: OBSTETRICS AND GYNECOLOGY | Age: 30
End: 2025-07-16

## 2025-07-16 NOTE — PROGRESS NOTES
OB FOLLOW UP      Chief Complaint   Patient presents with    Routine Prenatal Visit     Subjective:   Anna Silva is a 30 y.o.  38w5d patient being seen today for her routine obstetrical follow up visit.     Sat had VB seen on L+ and BP mildly elev.  Sexual acitviity without any penetration on Saturday.  Bleeding was bright red, then passed clot.  Has had no more red bleeding.  Dark now.  No LOF.  AFM.  More cxns since Saturday.      Objective:  /89   Wt 72.1 kg (159 lb)   LMP 10/31/2024   BMI 29.08 kg/m²  Body mass index is 29.08 kg/m².  Chaperone present during pelvic exam if performed.  See OB flow sheet for fundal height (not performed if US day of OV), FHT, edema, cvx exam if performed, and Upro/Uglu.     Speculum exam: Mucousy brown at cervix and upper vaginal vault, quarter sized dark brown clot, cvx 2cm   Visibly dany during office visit.      Assessment and Plan:  38w5d     Diagnoses and all orders for this visit:    1. Supervision of other normal pregnancy, antepartum (Primary)  Overview:  COLLINS finalized: 25 per 14week US and per ACOG    Declined CF.  NIPS low risk XY.  Declined AFP    COVID: Recommended, declines  Flu: Vaccinated for 24-25 season  Tdap:  Vaccinated     1hr Glucola: 130, 3hr all wnl  FU RPR w glucola: Negative  ? Desires Sterilization: Declined    Anatomy US: [facility] 3/17/25 cwd, post placenta, CPC bilat.  Boy.   FU US: [facility] 25 breech, 1 pound 3 ounce, 32%, no choroid plexus cyst seen  FU US:  [facility] 25 3#4oz, 43%, AC 60%, JULIETA 21, gd1 placenta  FU US: [facility] 25 5#2oz, 49%, AC 67%, JULIETA 17.9, gd2 placenta, variable presentation    PROBLEM LIST/PLAN:   Hx of GHTN - baseline labs normal, UPC 0.08, ASA 81 mg- continue   [facility] recommends growth ultrasound at 30 weeks and then every 4 weeks afterwards- done  Hx  w PPH - G1 deep transverse arrest, chorio, 1200ebl, Blynch stitch.  No blood transfusion.  Recommend RCS.  Op Note by Jane Olvera,   (2022 09:44)  Hx cold sores- Valtrex 34-36weeks,  continue    Bilat CPC- not seen w MFM    Orders:  -     POC Urinalysis Dipstick    2. Borderline platelets at Glucola and history of low platelets with G1  Overview:  Recheck at 34 wks wnl 157      3. Early labor w VB  Comments:  Plan CS today        To labor and delivery for .  Preop  counseling performed.  See history and physical and orders.      Return for 1 week BP check and early PP OV.            Jane Olvera, DO  2025    [facility] [place] [address] [facility]  [address] [place] [address] [address] [place] [address]  Dept: [phone]  Dept Fax: [phone]  Loc: [phone]  Loc Fax: [phone]

## 2025-07-19 ENCOUNTER — HOSPITAL ENCOUNTER (OUTPATIENT)
Facility: HOSPITAL | Age: 30
Setting detail: SURGERY ADMIT
Discharge: HOME | End: 2025-07-19
Attending: OBSTETRICS & GYNECOLOGY | Admitting: OBSTETRICS & GYNECOLOGY
Payer: COMMERCIAL

## 2025-07-19 VITALS
OXYGEN SATURATION: 99 % | RESPIRATION RATE: 18 BRPM | SYSTOLIC BLOOD PRESSURE: 125 MMHG | HEART RATE: 82 BPM | DIASTOLIC BLOOD PRESSURE: 88 MMHG

## 2025-07-19 LAB
ALBUMIN SERPL-MCNC: 3.9 G/DL (ref 3.5–5.2)
ALBUMIN/GLOB SERPL: 1.3 G/DL
ALP SERPL-CCNC: 206 U/L (ref 39–117)
ALT SERPL W P-5'-P-CCNC: 11 U/L (ref 1–33)
ANION GAP SERPL CALCULATED.3IONS-SCNC: 13.6 MMOL/L (ref 5–15)
AST SERPL-CCNC: 17 U/L (ref 1–32)
BILIRUB BLD-MCNC: NEGATIVE MG/DL
BILIRUB SERPL-MCNC: 0.3 MG/DL (ref 0–1.2)
BUN SERPL-MCNC: 10.9 MG/DL (ref 6–20)
BUN/CREAT SERPL: 15.6 (ref 7–25)
CALCIUM SPEC-SCNC: 9.1 MG/DL (ref 8.6–10.5)
CHLORIDE SERPL-SCNC: 102 MMOL/L (ref 98–107)
CLARITY, POC: CLEAR
CO2 SERPL-SCNC: 20.4 MMOL/L (ref 22–29)
COLOR UR: ABNORMAL
CREAT SERPL-MCNC: 0.7 MG/DL (ref 0.57–1)
CREAT UR-MCNC: 20.3 MG/DL
DEPRECATED RDW RBC AUTO: 41.2 FL (ref 37–54)
EGFRCR SERPLBLD CKD-EPI 2021: 118 ML/MIN/1.73
ERYTHROCYTE [DISTWIDTH] IN BLOOD BY AUTOMATED COUNT: 13.2 % (ref 12.3–15.4)
GLOBULIN UR ELPH-MCNC: 3 GM/DL
GLUCOSE SERPL-MCNC: 72 MG/DL (ref 65–99)
GLUCOSE UR STRIP-MCNC: NEGATIVE MG/DL
HCT VFR BLD AUTO: 37.2 % (ref 34–46.6)
HGB BLD-MCNC: 12.3 G/DL (ref 12–15.9)
KETONES UR QL: ABNORMAL
LEUKOCYTE EST, POC: NEGATIVE
MCH RBC QN AUTO: 28.9 PG (ref 26.6–33)
MCHC RBC AUTO-ENTMCNC: 33.1 G/DL (ref 31.5–35.7)
MCV RBC AUTO: 87.3 FL (ref 79–97)
NITRITE UR-MCNC: NEGATIVE MG/ML
PH UR: 6 [PH] (ref 5–8)
PLATELET # BLD AUTO: 171 10*3/MM3 (ref 140–450)
PMV BLD AUTO: 11 FL (ref 6–12)
POTASSIUM SERPL-SCNC: 3.9 MMOL/L (ref 3.5–5.2)
PROT ?TM UR-MCNC: 6.1 MG/DL
PROT SERPL-MCNC: 6.9 G/DL (ref 6–8.5)
PROT UR STRIP-MCNC: NEGATIVE MG/DL
PROT/CREAT UR: 0.3 MG/G{CREAT}
RBC # BLD AUTO: 4.26 10*6/MM3 (ref 3.77–5.28)
RBC # UR STRIP: ABNORMAL /UL
SODIUM SERPL-SCNC: 136 MMOL/L (ref 136–145)
SP GR UR: 1.01 (ref 1–1.03)
UROBILINOGEN UR QL: ABNORMAL
WBC NRBC COR # BLD AUTO: 9.17 10*3/MM3 (ref 3.4–10.8)

## 2025-07-19 PROCEDURE — 81002 URINALYSIS NONAUTO W/O SCOPE: CPT | Performed by: OBSTETRICS & GYNECOLOGY

## 2025-07-19 PROCEDURE — 84156 ASSAY OF PROTEIN URINE: CPT | Performed by: OBSTETRICS & GYNECOLOGY

## 2025-07-19 PROCEDURE — G0463 HOSPITAL OUTPT CLINIC VISIT: HCPCS

## 2025-07-19 PROCEDURE — 85027 COMPLETE CBC AUTOMATED: CPT | Performed by: OBSTETRICS & GYNECOLOGY

## 2025-07-19 PROCEDURE — 82570 ASSAY OF URINE CREATININE: CPT | Performed by: OBSTETRICS & GYNECOLOGY

## 2025-07-19 PROCEDURE — 80053 COMPREHEN METABOLIC PANEL: CPT | Performed by: OBSTETRICS & GYNECOLOGY

## 2025-07-19 NOTE — NURSING NOTE
MD notified of patient arrival.  at 38w3d, Repeat C/S scheduled with Dr. Olvera 25, presents with Vaginal Bleeding. Patient states her bleeding started at approximately 1800 this evening. Patient states she had intercourse at approximately 1300. Positive fetal movement. Fetal Tracing, Reactive. Irregular contractions noted, mild to palpation. Patients states she is not currently feeling contractions. No bleeding noted on external exam. SVE, /2. Dark red blood noted to exam glove. VS, reviewed. Notified of elevated /86, 150/91. Urine dip entered into system for MD viewing. Verbal order for CBC, Comp, PC Ratio.

## 2025-07-20 NOTE — DISCHARGE INSTR - OTHER ORDERS
Keep scheduled NST on 7/21/2025 at 0900, [facility], Labor and Delivery.   Keep next scheduled appointment in office with Dr. Olvera.

## 2025-07-20 NOTE — H&P
MARIAD Montez  Obstetric History and Physical    Chief Complaint   Patient presents with    Vaginal Bleeding       Subjective     PNC provided by:  [facility]    HPI:    Patient is a 30 y.o. female  currently at 38w3d, who presents with vb today;  pt had intercourse at approx 1300 and had bleeding at approx 1800;  occ ctx, no LOF, good FM;  pt nervous when came in and is scheduled for RCS on .    Her prenatal care is complicated by  Prior  desires repeat ;  CS with PPH after deep tr arrest with chorio;    Her previous obstetric/gynecological history is noted for is remarkable for h/o GHTN    The following portions of the patients history were reviewed and updated as appropriate:   current medications, allergies, past medical history, past surgical history, past family history, past social history and current problem list.     Prenatal Information:  Prenatal Results       Initial Prenatal Labs       Test Value Reference Range Date Time    Hemoglobin  13.5 g/dL 12.0 - 15.9 25 1046    Hematocrit  40.6 % 34.0 - 46.6 25 1046    Platelets  185 10*3/mm3 140 - 450 25 1046    Rubella IgG  7.68 index Immune >0.99 25 1046    Hepatitis B SAg  Non-Reactive  Non-Reactive 25 1046    Hepatitis C Ab  Non-Reactive  Non-Reactive 25 1046    RPR  Non-Reactive  Non-Reactive 25 1046    T. Pallidum Ab   Non-Reactive  Non-Reactive 25 1408    ABO  O   25 1046    Rh  Positive   25 1046    Antibody Screen  Negative   25 1046    HIV  Non-Reactive  Non-Reactive 25 1046    Urine Culture  No growth   25 1046    Gonorrhea  Negative  Negative 25 1046    Chlamydia  Negative  Negative 25 1046    TSH        HgB A1c         Varicella IgG        Hemoglobinopathy Fractionation  Comment   25 1046    Hemoglobinopathy (genetic testing)        Cystic fibrosis         Spinal muscular atrophy        Fragile X                  Fetal testing         Test Value Reference Range Date Time    NIPT        MSAFP        AFP-4                  2nd and 3rd Trimester       Test Value Reference Range Date Time    Hemoglobin (repeated)  12.3 g/dL 12.0 - 15.9 07/19/25 1958       11.5 g/dL 12.0 - 15.9 06/16/25 1505       11.4 g/dL 12.0 - 15.9 04/14/25 1408    Hematocrit (repeated)  37.2 % 34.0 - 46.6 07/19/25 1958       36.4 % 34.0 - 46.6 06/16/25 1505       33.5 % 34.0 - 46.6 04/14/25 1408    Platelets   171 10*3/mm3 140 - 450 07/19/25 1958       157 10*3/mm3 140 - 450 06/16/25 1505       157 10*3/mm3 140 - 450 04/14/25 1408       185 10*3/mm3 140 - 450 01/07/25 1046    1 hour GTT   130 mg/dL 65 - 139 04/14/25 1408    Antibody Screen (repeated)        3rd TM syphilis scrn (repeated)  RPR         3rd TM syphilis scrn (repeated) TP-Ab        3rd TM syphilis screen TB-Ab (FTA)        Syphilis cascade test TP-Ab (EIA)        Syphilis cascade TPPA        GTT Fasting  92 mg/dL 65 - 94 04/25/25 0812    GTT 1 Hr  136 mg/dL 65 - 179 04/25/25 0929    GTT 2 Hr  117 mg/dL 65 - 154 04/25/25 1020    GTT 3 Hr  45 mg/dL 65 - 139 04/25/25 1134    Group B Strep  Positive  Negative 06/30/25 1318              Other testing        Test Value Reference Range Date Time    Parvo IgG         CMV IgG                   Drug Screening       Test Value Reference Range Date Time    Amphetamine Screen  Negative  Negative 01/07/25 1046    Barbiturate Screen  Negative  Negative 01/07/25 1046    Benzodiazepine Screen  Negative  Negative 01/07/25 1046    Methadone Screen  Negative  Negative 01/07/25 1046    Phencyclidine Screen  Negative  Negative 01/07/25 1046    Opiates Screen  Negative  Negative 01/07/25 1046    THC Screen  Negative  Negative 01/07/25 1046    Cocaine Screen  Negative  Negative 01/07/25 1046    Propoxyphene Screen        Buprenorphine Screen  Negative  Negative 01/07/25 1046    Methamphetamine Screen  Negative  Negative 01/07/25 1046    Oxycodone Screen  Negative  Negative 01/07/25 1042     Tricyclic Antidepressants Screen  Negative  Negative 01/07/25 1046              Legend    ^: Historical                          External Prenatal Results       Pregnancy Outside Results - Transcribed From Office Records - See Scanned Records For Details       Test Value Date Time    ABO  O  01/07/25 1046    Rh  Positive  01/07/25 1046    Antibody Screen  Negative  01/07/25 1046    Varicella IgG       Rubella  7.68 index 01/07/25 1046    Hgb  12.3 g/dL 07/19/25 1958       11.5 g/dL 06/16/25 1505       11.4 g/dL 04/14/25 1408       13.5 g/dL 01/07/25 1046    Hct  37.2 % 07/19/25 1958       36.4 % 06/16/25 1505       33.5 % 04/14/25 1408       40.6 % 01/07/25 1046    HgB A1c        1h GTT  130 mg/dL 04/14/25 1408    3h GTT Fasting  92 mg/dL 04/25/25 0812    3h GTT 1 hour  136 mg/dL 04/25/25 0929    3h GTT 2 hour  117 mg/dL 04/25/25 1020    3h GTT 3 hour   45 mg/dL 04/25/25 1134    Gonorrhea (discrete)  Negative  01/07/25 1046    Chlamydia (discrete)  Negative  01/07/25 1046    RPR  Non-Reactive  01/07/25 1046    Syphils cascade: TP-Ab (FTA)  Non-Reactive  04/14/25 1408    TP-Ab  Non-Reactive  04/14/25 1408    TP-Ab (EIA)       TPPA       HBsAg  Non-Reactive  01/07/25 1046    Herpes Simplex Virus PCR       Herpes Simplex VIrus Culture       HIV  Non-Reactive  01/07/25 1046    Hep C RNA Quant PCR       Hep C Antibody  Non-Reactive  01/07/25 1046    AFP       NIPT       Cystic Fibrosis (Cyrus)       Cystic Fibroisis        Spinal Muscular atrophy       Fragile X       Group B Strep  Positive  06/30/25 1318    GBS Susceptibility to Clindamycin       GBS Susceptibility to Erythromycin       Fetal Fibronectin       Genetic Testing, Maternal Blood                 Drug Screening       Test Value Date Time    Urine Drug Screen       Amphetamine Screen  Negative  01/07/25 1046    Barbiturate Screen  Negative  01/07/25 1046    Benzodiazepine Screen  Negative  01/07/25 1046    Methadone Screen  Negative  01/07/25 1046     Phencyclidine Screen  Negative  25 1046    Opiates Screen  Negative  25 1046    THC Screen  Negative  25 1046    Cocaine Screen       Propoxyphene Screen       Buprenorphine Screen  Negative  25 1046    Methamphetamine Screen       Oxycodone Screen  Negative  25 1046    Tricyclic Antidepressants Screen  Negative  25 1046              Legend    ^: Historical                             Problem List:     Patient Active Problem List   Diagnosis    Supervision of other normal pregnancy, antepartum    History of gestational hypertension    Previous  section    Hx of postpartum hemorrhage, currently pregnant    History of cold sores    Elevated glucose tolerance test    Borderline platelets at Glucola and history of low platelets with G1    Unstable lie of fetus    GBS (group B Streptococcus carrier), +RV culture, currently pregnant        Past OB History:     OB History    Para Term  AB Living   2 1 1 0 0 1   SAB IAB Ectopic Molar Multiple Live Births   0 0 0 0 0 1      # Outcome Date GA Lbr Amilcar/2nd Weight Sex Type Anes PTL Lv   2 Current            1 Term 22 39w0d  3630 g (8 lb) F CS-LTranv EPI N LAY      Complications: Intraamniotic Infection, Postpartum Hemorrhage, Cephalopelvic Disproportion      Name: ARACELY TOUREFRANKLIN      Apgar1: 7  Apgar5: 9       Past Medical History: Past Medical History:   Diagnosis Date    Anemia     Antepartum chorioamnionitis 2022    Gestational hypertension     Herpes     History of cold sores     Single delivery by  2022      Past Surgical History Past Surgical History:   Procedure Laterality Date     SECTION N/A 2022    Procedure:  SECTION PRIMARY;  Surgeon: Jane Olvera DO;  Location: [facility] LABOR DELIVERY;  Service: Obstetrics/Gynecology;  Laterality: N/A;    WISDOM TOOTH EXTRACTION        Family History: Family History   Problem Relation Age of Onset    Hyperlipidemia Mother      Hypertension Mother     Hyperlipidemia Father     Diabetes Father     Diabetes Maternal Grandfather     Diabetes Paternal Grandmother     Diabetes Maternal Grandmother     Alcohol abuse Neg Hx     Arthritis Neg Hx     Asthma Neg Hx     Birth defects Neg Hx     Bleeding Disorder Neg Hx     Cancer Neg Hx     COPD Neg Hx     Depression Neg Hx     Drug abuse Neg Hx     Early death Neg Hx     Hearing loss Neg Hx     Heart disease Neg Hx     Breast cancer Neg Hx     Ovarian cancer Neg Hx     Uterine cancer Neg Hx     Colon cancer Neg Hx     Melanoma Neg Hx     Prostate cancer Neg Hx     Deep vein thrombosis Neg Hx     Pulmonary embolism Neg Hx       Social History:  reports that she has never smoked. She has never been exposed to tobacco smoke. She has never used smokeless tobacco.   reports no history of alcohol use.   reports no history of drug use.        General ROS: Pertinent items are noted in HPI        Home Medications:  Magnesium, aspirin, prenatal vitamin 27-0.8, and valACYclovir    Allergies:  No Known Allergies    Objective       Vital Signs Range for the last 24 hours  Temperature:     Temp Source:     BP: BP: (128-150)/(83-91) 128/85   Pulse: Heart Rate:  [80-87] 84   Respirations: Resp:  [20] 20   SPO2: SpO2:  [100 %] 100 %     Physical Examination:   General appearance - alert, well appearing, and in no distress  Mental status - alert, oriented to person, place, and time  Abdomen - soft, nontender, nondistended,   Pelvic - normal external genitalia, vulva, vagina, cervix, and uterus   Back exam - full range of motion, no tenderness or pain on motion  Neurological - alert, oriented, normal speech  Extremities - peripheral pulses normal  Skin - normal coloration and turgor, no suspicious skin lesions noted    Presentation: vtx   Cervix: Method: sterile vaginal exam performed   Dilation: Cervical Dilation (cm): 1   Effacement: Cervical Effacement: 30   Station:         Fetal Heart Rate Assessment :  140s, R,  GLTV, cat 1  Method:     Beats/min:     Baseline:     Variability:     Accels:     Decels:     Tracing Category:       Uterine Assessment :  occ  Method:     Frequency (min):     Ctx Count in 10 min:     Duration:     Intensity:     Logan Units:       Lab Results (last 24 hours)       Procedure Component Value Units Date/Time    POC Urinalysis Dipstick [[id number]]  (Abnormal) Collected: 07/19/25 1954    Specimen: Urine Updated: 07/19/25 1957     Color Other     Comment: pink tinged        Clarity, UA Clear     Glucose, UA Negative mg/dL      Bilirubin Negative     Ketones, UA 40 mg/dL     Specific Gravity  1.010     Blood, UA Large     pH, Urine 6.0     Protein, POC Negative mg/dL      Urobilinogen, UA 0.2 E.U./dL     Leukocytes Negative     Nitrite, UA Negative    Comprehensive Metabolic Panel [[id number]]  (Abnormal) Collected: 07/19/25 1958    Specimen: Blood Updated: 07/19/25 2031     Glucose 72 mg/dL      BUN 10.9 mg/dL      Creatinine 0.70 mg/dL      Sodium 136 mmol/L      Potassium 3.9 mmol/L      Chloride 102 mmol/L      CO2 20.4 mmol/L      Calcium 9.1 mg/dL      Total Protein 6.9 g/dL      Albumin 3.9 g/dL      ALT (SGPT) 11 U/L      AST (SGOT) 17 U/L      Alkaline Phosphatase 206 U/L      Total Bilirubin 0.3 mg/dL      Globulin 3.0 gm/dL      A/G Ratio 1.3 g/dL      BUN/Creatinine Ratio 15.6     Anion Gap 13.6 mmol/L      eGFR 118.0 mL/min/1.73     Narrative:      GFR Categories in Chronic Kidney Disease (CKD)              GFR Category          GFR (mL/min/1.73)    Interpretation  G1                    90 or greater        Normal or high (1)  G2                    60-89                Mild decrease (1)  G3a                   45-59                Mild to moderate decrease  G3b                   30-44                Moderate to severe decrease  G4                    15-29                Severe decrease  G5                    14 or less           Kidney failure    (1)In the absence of evidence of kidney  disease, neither GFR category G1 or G2 fulfill the criteria for CKD.    eGFR calculation 2021 CKD-EPI creatinine equation, which does not include race as a factor    CBC (No Diff) [[id number]]  (Normal) Collected: 07/19/25 1958    Specimen: Blood Updated: 07/19/25 2008     WBC 9.17 10*3/mm3      RBC 4.26 10*6/mm3      Hemoglobin 12.3 g/dL      Hematocrit 37.2 %      MCV 87.3 fL      MCH 28.9 pg      MCHC 33.1 g/dL      RDW 13.2 %      RDW-SD 41.2 fl      MPV 11.0 fL      Platelets 171 10*3/mm3     Protein / Creatinine Ratio, Urine - Urine, Clean Catch [[id number]] Collected: 07/19/25 1958    Specimen: Urine, Clean Catch Updated: 07/19/25 2029     Creatinine, Urine 20.3 mg/dL      Total Protein, Urine 6.1 mg/dL      Protein/Creatinine Ratio, Urine 0.30             GBS is positive     Assessment & Plan     Vaginal bleeding  Elevated BP reading    Assessment:  1.  Intrauterine pregnancy at 38w3d gestation with reactive fetal status.    2.  vaginal bleeding secondary to SI;  elevated BP but not 20 min apart and now is normal  3.  Obstetrical history significant for is remarkable for .  4.  GBS status:   Group B Strep, DNA   Date Value Ref Range Status   06/30/2025 Positive (A) Negative Final       Plan:  1. Discharge to home.  D/w Dr. Ferrara, she wants pt to return Monday for an NST as doesn't meet criteria for GHTN (145/86 and 150/91 only 15 min apart);  will notify Dr. Olvera  2.  Plan of care has been reviewed with patient and patient agrees.   3.  Risks, benefits of treatment plan have been discussed.  4.  All questions have been answered.        Electronically signed by Estelle Denise MD, 07/19/25, 8:48 PM EDT.

## 2025-07-20 NOTE — NON STRESS TEST
Obstetrical Non-stress Test Interpretation     Name:  Anna Silva  MRN: [id number]    30 y.o. female  at 38w3d    Indication: Vaginal Bleeding      Fetal Assessment  Fetal Movement: active  Fetal HR Assessment Method: external  Fetal HR (beats/min): 135  Fetal HR Baseline: normal range  Fetal HR Variability: moderate (amplitude range 6 to 25 bpm)  Fetal HR Accelerations: greater than/equal to 15 bpm, lasting at least 15 seconds  Fetal HR Decelerations: absent  Sinusoidal Pattern Present: absent    /88 (BP Location: Right arm, Patient Position: Sitting)   Pulse 82   Resp 18   LMP 10/31/2024   SpO2 99%     Reason for test: Vaginal Bleeding  Date of Test: 2025  Time frame of test: 5856-5862  RN NST Interpretation: Reactive      Leti Magana RN  2025  22:00 EDT

## 2025-07-20 NOTE — NURSING NOTE
Verbal discharge order received. Discharge instruction sheet printed, reviewed. Labor precautions, reviewed. Third trimester, Fetal movement education printed, reviewed, sent home with patient. Patient states she has no questions regarding her current medications. Instructed to return to [facility], Labor and Delivery for a NST, 7/21/2025 at 0900. Patient verbalizes understanding. Pt ambulatory, undelivered, in stable condition upon discharge home.

## 2025-07-21 ENCOUNTER — ROUTINE PRENATAL (OUTPATIENT)
Dept: OBSTETRICS AND GYNECOLOGY | Age: 30
End: 2025-07-21
Payer: COMMERCIAL

## 2025-07-21 ENCOUNTER — PREP FOR SURGERY (OUTPATIENT)
Dept: OTHER | Facility: HOSPITAL | Age: 30
End: 2025-07-21
Payer: COMMERCIAL

## 2025-07-21 ENCOUNTER — HOSPITAL ENCOUNTER (OUTPATIENT)
Facility: HOSPITAL | Age: 30
Discharge: HOME | End: 2025-07-21
Attending: OBSTETRICS & GYNECOLOGY | Admitting: OBSTETRICS & GYNECOLOGY
Payer: COMMERCIAL

## 2025-07-21 ENCOUNTER — ANESTHESIA EVENT (OUTPATIENT)
Dept: LABOR AND DELIVERY | Facility: HOSPITAL | Age: 30
End: 2025-07-21
Payer: COMMERCIAL

## 2025-07-21 ENCOUNTER — HOSPITAL ENCOUNTER (OUTPATIENT)
Dept: LABOR AND DELIVERY | Facility: HOSPITAL | Age: 30
Discharge: HOME | End: 2025-07-21
Payer: COMMERCIAL

## 2025-07-21 ENCOUNTER — HOSPITAL ENCOUNTER (INPATIENT)
Facility: HOSPITAL | Age: 30
LOS: 2 days | Discharge: HOME | End: 2025-07-23
Attending: OBSTETRICS & GYNECOLOGY | Admitting: OBSTETRICS & GYNECOLOGY
Payer: COMMERCIAL

## 2025-07-21 ENCOUNTER — ANESTHESIA (OUTPATIENT)
Dept: LABOR AND DELIVERY | Facility: HOSPITAL | Age: 30
End: 2025-07-21
Payer: COMMERCIAL

## 2025-07-21 VITALS — HEART RATE: 88 BPM | OXYGEN SATURATION: 98 % | DIASTOLIC BLOOD PRESSURE: 75 MMHG | SYSTOLIC BLOOD PRESSURE: 129 MMHG

## 2025-07-21 VITALS — BODY MASS INDEX: 29.08 KG/M2 | WEIGHT: 159 LBS | SYSTOLIC BLOOD PRESSURE: 132 MMHG | DIASTOLIC BLOOD PRESSURE: 89 MMHG

## 2025-07-21 DIAGNOSIS — Z34.80 SUPERVISION OF OTHER NORMAL PREGNANCY, ANTEPARTUM: Primary | ICD-10-CM

## 2025-07-21 DIAGNOSIS — Z37.9 NORMAL LABOR: ICD-10-CM

## 2025-07-21 DIAGNOSIS — R89.9 ABNORMAL LABORATORY TEST: ICD-10-CM

## 2025-07-21 PROBLEM — O13.3 GESTATIONAL HYPERTENSION, THIRD TRIMESTER: Status: ACTIVE | Noted: 2025-07-21

## 2025-07-21 PROBLEM — O46.93 VAGINAL BLEEDING IN PREGNANCY, THIRD TRIMESTER: Status: ACTIVE | Noted: 2025-07-21

## 2025-07-21 PROBLEM — R03.0 ELEVATED BLOOD PRESSURE READING: Status: ACTIVE | Noted: 2025-07-21

## 2025-07-21 LAB
ABO GROUP BLD: NORMAL
ALBUMIN SERPL-MCNC: 3.7 G/DL (ref 3.5–5.2)
ALBUMIN/GLOB SERPL: 1.3 G/DL
ALP SERPL-CCNC: 199 U/L (ref 39–117)
ALT SERPL W P-5'-P-CCNC: 10 U/L (ref 1–33)
AMPHET+METHAMPHET UR QL: NEGATIVE
AMPHETAMINES UR QL: NEGATIVE
ANION GAP SERPL CALCULATED.3IONS-SCNC: 11.6 MMOL/L (ref 5–15)
AST SERPL-CCNC: 16 U/L (ref 1–32)
ATMOSPHERIC PRESS: 740.9 MMHG
ATMOSPHERIC PRESS: 742.7 MMHG
BARBITURATES UR QL SCN: NEGATIVE
BASE EXCESS BLDCOA CALC-SCNC: -3.6 MMOL/L (ref -2–2)
BASE EXCESS BLDCOV CALC-SCNC: -2.2 MMOL/L (ref -2–2)
BENZODIAZ UR QL SCN: NEGATIVE
BILIRUB SERPL-MCNC: 0.2 MG/DL (ref 0–1.2)
BLD GP AB SCN SERPL QL: NEGATIVE
BUN SERPL-MCNC: 10.1 MG/DL (ref 6–20)
BUN/CREAT SERPL: 18.4 (ref 7–25)
BUPRENORPHINE SERPL-MCNC: NEGATIVE NG/ML
CALCIUM SPEC-SCNC: 8.8 MG/DL (ref 8.6–10.5)
CANNABINOIDS SERPL QL: NEGATIVE
CHLORIDE SERPL-SCNC: 104 MMOL/L (ref 98–107)
CO2 SERPL-SCNC: 18.4 MMOL/L (ref 22–29)
COCAINE UR QL: NEGATIVE
CREAT SERPL-MCNC: 0.55 MG/DL (ref 0.57–1)
CREAT UR-MCNC: 31.2 MG/DL
DEPRECATED RDW RBC AUTO: 40 FL (ref 37–54)
EGFRCR SERPLBLD CKD-EPI 2021: 125.1 ML/MIN/1.73
ERYTHROCYTE [DISTWIDTH] IN BLOOD BY AUTOMATED COUNT: 13.2 % (ref 12.3–15.4)
FENTANYL UR-MCNC: NEGATIVE NG/ML
GLOBULIN UR ELPH-MCNC: 2.9 GM/DL
GLUCOSE SERPL-MCNC: 77 MG/DL (ref 65–99)
GLUCOSE UR STRIP-MCNC: NEGATIVE MG/DL
HCO3 BLDCOA-SCNC: 22.2 MMOL/L
HCO3 BLDCOV-SCNC: 24.1 MMOL/L
HCT VFR BLD AUTO: 36.6 % (ref 34–46.6)
HGB BLD-MCNC: 12.4 G/DL (ref 12–15.9)
MCH RBC QN AUTO: 29 PG (ref 26.6–33)
MCHC RBC AUTO-ENTMCNC: 33.9 G/DL (ref 31.5–35.7)
MCV RBC AUTO: 85.5 FL (ref 79–97)
METHADONE UR QL SCN: NEGATIVE
MODALITY: ABNORMAL
MODALITY: ABNORMAL
OPIATES UR QL: NEGATIVE
OXYCODONE UR QL SCN: NEGATIVE
PCO2 BLDCOA: 42 MMHG (ref 33–49)
PCO2 BLDCOV: 46.3 MM HG (ref 35–51.3)
PCP UR QL SCN: NEGATIVE
PH BLDCOA: 7.33 PH UNITS (ref 7.18–7.34)
PH BLDCOV: 7.32 PH UNITS (ref 7.26–7.4)
PLATELET # BLD AUTO: 157 10*3/MM3 (ref 140–450)
PMV BLD AUTO: 10.8 FL (ref 6–12)
PO2 BLDCOA: 32.1 MMHG
PO2 BLDCOV: 31.6 MM HG (ref 19–39)
POTASSIUM SERPL-SCNC: 3.8 MMOL/L (ref 3.5–5.2)
PROT ?TM UR-MCNC: 5.5 MG/DL
PROT SERPL-MCNC: 6.6 G/DL (ref 6–8.5)
PROT UR STRIP-MCNC: NEGATIVE MG/DL
PROT/CREAT UR: 0.18 MG/G{CREAT}
RBC # BLD AUTO: 4.28 10*6/MM3 (ref 3.77–5.28)
RH BLD: POSITIVE
SAO2 % BLDCOA: 57.4 %
SAO2 % BLDCOV: 55.6 %
SODIUM SERPL-SCNC: 134 MMOL/L (ref 136–145)
T&S EXPIRATION DATE: NORMAL
TREPONEMA PALLIDUM IGG+IGM AB [PRESENCE] IN SERUM OR PLASMA BY IMMUNOASSAY: NORMAL
TRICYCLICS UR QL SCN: NEGATIVE
WBC NRBC COR # BLD AUTO: 7.59 10*3/MM3 (ref 3.4–10.8)

## 2025-07-21 PROCEDURE — 25010000002 OXYTOCIN PER 10 UNITS: Performed by: REGISTERED NURSE

## 2025-07-21 PROCEDURE — 25810000003 LACTATED RINGERS PER 1000 ML: Performed by: OBSTETRICS & GYNECOLOGY

## 2025-07-21 PROCEDURE — C1765 ADHESION BARRIER: HCPCS | Performed by: OBSTETRICS & GYNECOLOGY

## 2025-07-21 PROCEDURE — 80053 COMPREHEN METABOLIC PANEL: CPT | Performed by: OBSTETRICS & GYNECOLOGY

## 2025-07-21 PROCEDURE — 86900 BLOOD TYPING SEROLOGIC ABO: CPT | Performed by: OBSTETRICS & GYNECOLOGY

## 2025-07-21 PROCEDURE — 85027 COMPLETE CBC AUTOMATED: CPT | Performed by: OBSTETRICS & GYNECOLOGY

## 2025-07-21 PROCEDURE — 25010000002 CEFAZOLIN PER 500 MG: Performed by: OBSTETRICS & GYNECOLOGY

## 2025-07-21 PROCEDURE — 25810000003 SODIUM CHLORIDE 0.9 % SOLUTION 250 ML FLEX CONT: Performed by: OBSTETRICS & GYNECOLOGY

## 2025-07-21 PROCEDURE — 25010000002 BUPIVACAINE PF 0.75 % SOLUTION: Performed by: REGISTERED NURSE

## 2025-07-21 PROCEDURE — 25010000002 KETOROLAC TROMETHAMINE PER 15 MG: Performed by: OBSTETRICS & GYNECOLOGY

## 2025-07-21 PROCEDURE — 25010000002 FAMOTIDINE 10 MG/ML SOLUTION: Performed by: OBSTETRICS & GYNECOLOGY

## 2025-07-21 PROCEDURE — 86780 TREPONEMA PALLIDUM: CPT | Performed by: OBSTETRICS & GYNECOLOGY

## 2025-07-21 PROCEDURE — 59025 FETAL NON-STRESS TEST: CPT

## 2025-07-21 PROCEDURE — G0463 HOSPITAL OUTPT CLINIC VISIT: HCPCS

## 2025-07-21 PROCEDURE — 82570 ASSAY OF URINE CREATININE: CPT | Performed by: OBSTETRICS & GYNECOLOGY

## 2025-07-21 PROCEDURE — 25010000002 ONDANSETRON PER 1 MG: Performed by: REGISTERED NURSE

## 2025-07-21 PROCEDURE — 80307 DRUG TEST PRSMV CHEM ANLYZR: CPT | Performed by: OBSTETRICS & GYNECOLOGY

## 2025-07-21 PROCEDURE — 86850 RBC ANTIBODY SCREEN: CPT | Performed by: OBSTETRICS & GYNECOLOGY

## 2025-07-21 PROCEDURE — 25810000003 LACTATED RINGERS SOLUTION: Performed by: OBSTETRICS & GYNECOLOGY

## 2025-07-21 PROCEDURE — 82803 BLOOD GASES ANY COMBINATION: CPT

## 2025-07-21 PROCEDURE — 84156 ASSAY OF PROTEIN URINE: CPT | Performed by: OBSTETRICS & GYNECOLOGY

## 2025-07-21 PROCEDURE — 59510 CESAREAN DELIVERY: CPT | Performed by: OBSTETRICS & GYNECOLOGY

## 2025-07-21 PROCEDURE — 86901 BLOOD TYPING SEROLOGIC RH(D): CPT | Performed by: OBSTETRICS & GYNECOLOGY

## 2025-07-21 PROCEDURE — 25010000002 PROCHLORPERAZINE 10 MG/2ML SOLUTION: Performed by: REGISTERED NURSE

## 2025-07-21 PROCEDURE — 25010000002 AZITHROMYCIN PER 500 MG: Performed by: OBSTETRICS & GYNECOLOGY

## 2025-07-21 PROCEDURE — 25010000002 MORPHINE PER 10 MG: Performed by: REGISTERED NURSE

## 2025-07-21 PROCEDURE — 25810000003 LACTATED RINGERS PER 1000 ML: Performed by: REGISTERED NURSE

## 2025-07-21 DEVICE — SEAL HEMO SURG ARISTA/AH ABS/PWDR 3GM: Type: IMPLANTABLE DEVICE | Site: UTERUS | Status: FUNCTIONAL

## 2025-07-21 DEVICE — ABSORBABLE ADHESION BARRIER
Type: IMPLANTABLE DEVICE | Site: UTERUS | Status: FUNCTIONAL
Brand: GYNECARE INTERCEED

## 2025-07-21 RX ORDER — OXYTOCIN 10 [USP'U]/ML
INJECTION, SOLUTION INTRAMUSCULAR; INTRAVENOUS AS NEEDED
Status: DISCONTINUED | OUTPATIENT
Start: 2025-07-21 | End: 2025-07-21 | Stop reason: SURG

## 2025-07-21 RX ORDER — MISOPROSTOL 200 UG/1
800 TABLET ORAL AS NEEDED
Status: CANCELLED | OUTPATIENT
Start: 2025-07-21

## 2025-07-21 RX ORDER — LIDOCAINE HYDROCHLORIDE 10 MG/ML
0.5 INJECTION, SOLUTION EPIDURAL; INFILTRATION; INTRACAUDAL; PERINEURAL ONCE AS NEEDED
Status: CANCELLED | OUTPATIENT
Start: 2025-07-21

## 2025-07-21 RX ORDER — ONDANSETRON 4 MG/1
4 TABLET, ORALLY DISINTEGRATING ORAL EVERY 6 HOURS PRN
Status: DISCONTINUED | OUTPATIENT
Start: 2025-07-21 | End: 2025-07-23 | Stop reason: HOSPADM

## 2025-07-21 RX ORDER — SODIUM CHLORIDE, SODIUM LACTATE, POTASSIUM CHLORIDE, CALCIUM CHLORIDE 600; 310; 30; 20 MG/100ML; MG/100ML; MG/100ML; MG/100ML
INJECTION, SOLUTION INTRAVENOUS CONTINUOUS PRN
Status: DISCONTINUED | OUTPATIENT
Start: 2025-07-21 | End: 2025-07-21 | Stop reason: SURG

## 2025-07-21 RX ORDER — MISOPROSTOL 100 UG/1
TABLET ORAL AS NEEDED
Status: DISCONTINUED | OUTPATIENT
Start: 2025-07-21 | End: 2025-07-23 | Stop reason: HOSPADM

## 2025-07-21 RX ORDER — HYDROCORTISONE 25 MG/G
CREAM TOPICAL 3 TIMES DAILY PRN
Status: DISCONTINUED | OUTPATIENT
Start: 2025-07-21 | End: 2025-07-23 | Stop reason: HOSPADM

## 2025-07-21 RX ORDER — ACETAMINOPHEN 500 MG
1000 TABLET ORAL ONCE
Status: CANCELLED | OUTPATIENT
Start: 2025-07-21 | End: 2025-07-21

## 2025-07-21 RX ORDER — CARBOPROST TROMETHAMINE 250 UG/ML
250 INJECTION, SOLUTION INTRAMUSCULAR AS NEEDED
Status: CANCELLED | OUTPATIENT
Start: 2025-07-21

## 2025-07-21 RX ORDER — ALUMINA, MAGNESIA, AND SIMETHICONE 2400; 2400; 240 MG/30ML; MG/30ML; MG/30ML
15 SUSPENSION ORAL EVERY 4 HOURS PRN
Status: DISCONTINUED | OUTPATIENT
Start: 2025-07-21 | End: 2025-07-23 | Stop reason: HOSPADM

## 2025-07-21 RX ORDER — NALOXONE HCL 0.4 MG/ML
0.4 VIAL (ML) INJECTION
Status: DISCONTINUED | OUTPATIENT
Start: 2025-07-21 | End: 2025-07-23 | Stop reason: HOSPADM

## 2025-07-21 RX ORDER — SIMETHICONE 80 MG
80 TABLET,CHEWABLE ORAL 4 TIMES DAILY PRN
Status: DISCONTINUED | OUTPATIENT
Start: 2025-07-21 | End: 2025-07-23 | Stop reason: HOSPADM

## 2025-07-21 RX ORDER — SODIUM CHLORIDE 0.9 % (FLUSH) 0.9 %
10 SYRINGE (ML) INJECTION AS NEEDED
Status: DISCONTINUED | OUTPATIENT
Start: 2025-07-21 | End: 2025-07-23 | Stop reason: HOSPADM

## 2025-07-21 RX ORDER — ONDANSETRON 4 MG/1
4 TABLET, ORALLY DISINTEGRATING ORAL EVERY 6 HOURS PRN
Status: DISCONTINUED | OUTPATIENT
Start: 2025-07-21 | End: 2025-07-21 | Stop reason: HOSPADM

## 2025-07-21 RX ORDER — AMOXICILLIN 250 MG
1 CAPSULE ORAL 2 TIMES DAILY PRN
Status: DISCONTINUED | OUTPATIENT
Start: 2025-07-21 | End: 2025-07-23 | Stop reason: HOSPADM

## 2025-07-21 RX ORDER — SODIUM CHLORIDE 0.9 % (FLUSH) 0.9 %
10 SYRINGE (ML) INJECTION EVERY 12 HOURS SCHEDULED
Status: CANCELLED | OUTPATIENT
Start: 2025-07-21

## 2025-07-21 RX ORDER — SODIUM CHLORIDE 0.9 % (FLUSH) 0.9 %
10 SYRINGE (ML) INJECTION AS NEEDED
Status: CANCELLED | OUTPATIENT
Start: 2025-07-21

## 2025-07-21 RX ORDER — ACETAMINOPHEN 325 MG/1
650 TABLET ORAL EVERY 6 HOURS PRN
Qty: 30 TABLET | Refills: 0 | Status: SHIPPED | OUTPATIENT
Start: 2025-07-21 | End: 2025-08-04

## 2025-07-21 RX ORDER — ONDANSETRON 2 MG/ML
4 INJECTION INTRAMUSCULAR; INTRAVENOUS EVERY 6 HOURS PRN
Status: DISCONTINUED | OUTPATIENT
Start: 2025-07-21 | End: 2025-07-21 | Stop reason: HOSPADM

## 2025-07-21 RX ORDER — ACETAMINOPHEN 500 MG
1000 TABLET ORAL EVERY 6 HOURS
Status: DISCONTINUED | OUTPATIENT
Start: 2025-07-21 | End: 2025-07-22

## 2025-07-21 RX ORDER — OXYTOCIN/0.9 % SODIUM CHLORIDE 30/500 ML
999 PLASTIC BAG, INJECTION (ML) INTRAVENOUS ONCE
Status: DISCONTINUED | OUTPATIENT
Start: 2025-07-21 | End: 2025-07-21 | Stop reason: HOSPADM

## 2025-07-21 RX ORDER — IBUPROFEN 600 MG/1
600 TABLET, FILM COATED ORAL EVERY 6 HOURS PRN
Qty: 30 TABLET | Refills: 0 | Status: SHIPPED | OUTPATIENT
Start: 2025-07-21 | End: 2025-08-04

## 2025-07-21 RX ORDER — SODIUM CHLORIDE 0.9 % (FLUSH) 0.9 %
10 SYRINGE (ML) INJECTION EVERY 12 HOURS SCHEDULED
Status: DISCONTINUED | OUTPATIENT
Start: 2025-07-21 | End: 2025-07-21 | Stop reason: HOSPADM

## 2025-07-21 RX ORDER — ACETAMINOPHEN 325 MG/1
650 TABLET ORAL EVERY 6 HOURS
Status: DISCONTINUED | OUTPATIENT
Start: 2025-07-22 | End: 2025-07-22

## 2025-07-21 RX ORDER — VALACYCLOVIR HYDROCHLORIDE 500 MG/1
1000 TABLET, FILM COATED ORAL DAILY
Status: DISCONTINUED | OUTPATIENT
Start: 2025-07-22 | End: 2025-07-23 | Stop reason: HOSPADM

## 2025-07-21 RX ORDER — MORPHINE SULFATE 0.5 MG/ML
INJECTION, SOLUTION EPIDURAL; INTRATHECAL; INTRAVENOUS
Status: COMPLETED | OUTPATIENT
Start: 2025-07-21 | End: 2025-07-21

## 2025-07-21 RX ORDER — CARBOPROST TROMETHAMINE 250 UG/ML
250 INJECTION, SOLUTION INTRAMUSCULAR AS NEEDED
Status: DISCONTINUED | OUTPATIENT
Start: 2025-07-21 | End: 2025-07-21 | Stop reason: HOSPADM

## 2025-07-21 RX ORDER — ONDANSETRON 2 MG/ML
4 INJECTION INTRAMUSCULAR; INTRAVENOUS EVERY 6 HOURS PRN
Status: CANCELLED | OUTPATIENT
Start: 2025-07-21

## 2025-07-21 RX ORDER — OXYTOCIN/0.9 % SODIUM CHLORIDE 30/500 ML
250 PLASTIC BAG, INJECTION (ML) INTRAVENOUS CONTINUOUS
Status: CANCELLED | OUTPATIENT
Start: 2025-07-21 | End: 2025-07-21

## 2025-07-21 RX ORDER — KETOROLAC TROMETHAMINE 15 MG/ML
15 INJECTION, SOLUTION INTRAMUSCULAR; INTRAVENOUS EVERY 6 HOURS
Status: DISCONTINUED | OUTPATIENT
Start: 2025-07-21 | End: 2025-07-22

## 2025-07-21 RX ORDER — FAMOTIDINE 10 MG/ML
20 INJECTION, SOLUTION INTRAVENOUS ONCE AS NEEDED
Status: CANCELLED | OUTPATIENT
Start: 2025-07-21

## 2025-07-21 RX ORDER — TRANEXAMIC ACID 10 MG/ML
1000 INJECTION, SOLUTION INTRAVENOUS ONCE AS NEEDED
Status: COMPLETED | OUTPATIENT
Start: 2025-07-21 | End: 2025-07-21

## 2025-07-21 RX ORDER — OXYCODONE HYDROCHLORIDE 5 MG/1
5 TABLET ORAL EVERY 6 HOURS PRN
Status: DISCONTINUED | OUTPATIENT
Start: 2025-07-21 | End: 2025-07-23 | Stop reason: HOSPADM

## 2025-07-21 RX ORDER — PROMETHAZINE HYDROCHLORIDE 25 MG/1
12.5 TABLET ORAL EVERY 4 HOURS PRN
Status: DISCONTINUED | OUTPATIENT
Start: 2025-07-21 | End: 2025-07-23 | Stop reason: HOSPADM

## 2025-07-21 RX ORDER — SODIUM CHLORIDE 9 MG/ML
40 INJECTION, SOLUTION INTRAVENOUS AS NEEDED
Status: CANCELLED | OUTPATIENT
Start: 2025-07-21 | End: 2025-07-23

## 2025-07-21 RX ORDER — ONDANSETRON 2 MG/ML
4 INJECTION INTRAMUSCULAR; INTRAVENOUS EVERY 6 HOURS PRN
Status: DISCONTINUED | OUTPATIENT
Start: 2025-07-21 | End: 2025-07-23 | Stop reason: HOSPADM

## 2025-07-21 RX ORDER — SODIUM CHLORIDE 0.9 % (FLUSH) 0.9 %
10 SYRINGE (ML) INJECTION AS NEEDED
Status: DISCONTINUED | OUTPATIENT
Start: 2025-07-21 | End: 2025-07-21 | Stop reason: HOSPADM

## 2025-07-21 RX ORDER — ONDANSETRON 2 MG/ML
INJECTION INTRAMUSCULAR; INTRAVENOUS AS NEEDED
Status: DISCONTINUED | OUTPATIENT
Start: 2025-07-21 | End: 2025-07-21 | Stop reason: SURG

## 2025-07-21 RX ORDER — SODIUM CHLORIDE, SODIUM LACTATE, POTASSIUM CHLORIDE, CALCIUM CHLORIDE 600; 310; 30; 20 MG/100ML; MG/100ML; MG/100ML; MG/100ML
125 INJECTION, SOLUTION INTRAVENOUS CONTINUOUS
Status: ACTIVE | OUTPATIENT
Start: 2025-07-21 | End: 2025-07-22

## 2025-07-21 RX ORDER — IBUPROFEN 600 MG/1
600 TABLET, FILM COATED ORAL EVERY 6 HOURS
Status: DISCONTINUED | OUTPATIENT
Start: 2025-07-22 | End: 2025-07-22

## 2025-07-21 RX ORDER — FAMOTIDINE 20 MG/1
20 TABLET, FILM COATED ORAL ONCE AS NEEDED
Status: CANCELLED | OUTPATIENT
Start: 2025-07-21

## 2025-07-21 RX ORDER — SODIUM CHLORIDE 0.9 % (FLUSH) 0.9 %
10 SYRINGE (ML) INJECTION EVERY 12 HOURS SCHEDULED
Status: DISCONTINUED | OUTPATIENT
Start: 2025-07-21 | End: 2025-07-23 | Stop reason: HOSPADM

## 2025-07-21 RX ORDER — DOCUSATE SODIUM 100 MG/1
100 CAPSULE, LIQUID FILLED ORAL DAILY
Status: DISCONTINUED | OUTPATIENT
Start: 2025-07-22 | End: 2025-07-23 | Stop reason: HOSPADM

## 2025-07-21 RX ORDER — SODIUM CHLORIDE 9 MG/ML
40 INJECTION, SOLUTION INTRAVENOUS AS NEEDED
Status: DISCONTINUED | OUTPATIENT
Start: 2025-07-21 | End: 2025-07-23 | Stop reason: HOSPADM

## 2025-07-21 RX ORDER — LIDOCAINE HYDROCHLORIDE 10 MG/ML
0.5 INJECTION, SOLUTION EPIDURAL; INFILTRATION; INTRACAUDAL; PERINEURAL ONCE AS NEEDED
Status: DISCONTINUED | OUTPATIENT
Start: 2025-07-21 | End: 2025-07-21 | Stop reason: HOSPADM

## 2025-07-21 RX ORDER — SODIUM CHLORIDE, SODIUM LACTATE, POTASSIUM CHLORIDE, CALCIUM CHLORIDE 600; 310; 30; 20 MG/100ML; MG/100ML; MG/100ML; MG/100ML
150 INJECTION, SOLUTION INTRAVENOUS CONTINUOUS
Status: CANCELLED | OUTPATIENT
Start: 2025-07-21 | End: 2025-07-22

## 2025-07-21 RX ORDER — ACETAMINOPHEN 500 MG
1000 TABLET ORAL ONCE
Status: COMPLETED | OUTPATIENT
Start: 2025-07-21 | End: 2025-07-21

## 2025-07-21 RX ORDER — PROCHLORPERAZINE EDISYLATE 5 MG/ML
2.5 INJECTION INTRAMUSCULAR; INTRAVENOUS ONCE
Status: COMPLETED | OUTPATIENT
Start: 2025-07-21 | End: 2025-07-21

## 2025-07-21 RX ORDER — FAMOTIDINE 10 MG/ML
20 INJECTION, SOLUTION INTRAVENOUS ONCE AS NEEDED
Status: COMPLETED | OUTPATIENT
Start: 2025-07-21 | End: 2025-07-21

## 2025-07-21 RX ORDER — CALCIUM CARBONATE 500 MG/1
1 TABLET, CHEWABLE ORAL EVERY 4 HOURS PRN
Status: DISCONTINUED | OUTPATIENT
Start: 2025-07-21 | End: 2025-07-23 | Stop reason: HOSPADM

## 2025-07-21 RX ORDER — ONDANSETRON 4 MG/1
4 TABLET, ORALLY DISINTEGRATING ORAL EVERY 6 HOURS PRN
Status: CANCELLED | OUTPATIENT
Start: 2025-07-21

## 2025-07-21 RX ORDER — SODIUM CHLORIDE, SODIUM LACTATE, POTASSIUM CHLORIDE, CALCIUM CHLORIDE 600; 310; 30; 20 MG/100ML; MG/100ML; MG/100ML; MG/100ML
150 INJECTION, SOLUTION INTRAVENOUS CONTINUOUS
Status: DISCONTINUED | OUTPATIENT
Start: 2025-07-21 | End: 2025-07-22

## 2025-07-21 RX ORDER — BUPIVACAINE HYDROCHLORIDE 7.5 MG/ML
INJECTION, SOLUTION EPIDURAL; RETROBULBAR
Status: COMPLETED | OUTPATIENT
Start: 2025-07-21 | End: 2025-07-21

## 2025-07-21 RX ORDER — TRANEXAMIC ACID 10 MG/ML
1000 INJECTION, SOLUTION INTRAVENOUS ONCE AS NEEDED
Status: CANCELLED | OUTPATIENT
Start: 2025-07-21

## 2025-07-21 RX ORDER — FAMOTIDINE 20 MG/1
20 TABLET, FILM COATED ORAL ONCE AS NEEDED
Status: DISCONTINUED | OUTPATIENT
Start: 2025-07-21 | End: 2025-07-21 | Stop reason: HOSPADM

## 2025-07-21 RX ORDER — OXYTOCIN/0.9 % SODIUM CHLORIDE 30/500 ML
999 PLASTIC BAG, INJECTION (ML) INTRAVENOUS ONCE
Status: CANCELLED | OUTPATIENT
Start: 2025-07-21 | End: 2025-07-21

## 2025-07-21 RX ORDER — SODIUM CHLORIDE 9 MG/ML
40 INJECTION, SOLUTION INTRAVENOUS AS NEEDED
Status: DISCONTINUED | OUTPATIENT
Start: 2025-07-21 | End: 2025-07-21 | Stop reason: HOSPADM

## 2025-07-21 RX ORDER — FAMOTIDINE 10 MG/ML
20 INJECTION, SOLUTION INTRAVENOUS ONCE AS NEEDED
Status: DISCONTINUED | OUTPATIENT
Start: 2025-07-21 | End: 2025-07-21 | Stop reason: HOSPADM

## 2025-07-21 RX ORDER — HYDROXYZINE HYDROCHLORIDE 25 MG/1
50 TABLET, FILM COATED ORAL EVERY 6 HOURS PRN
Status: DISCONTINUED | OUTPATIENT
Start: 2025-07-21 | End: 2025-07-23 | Stop reason: HOSPADM

## 2025-07-21 RX ORDER — OXYCODONE HYDROCHLORIDE 5 MG/1
10 TABLET ORAL EVERY 6 HOURS PRN
Status: DISCONTINUED | OUTPATIENT
Start: 2025-07-21 | End: 2025-07-23 | Stop reason: HOSPADM

## 2025-07-21 RX ORDER — KETOROLAC TROMETHAMINE 30 MG/ML
30 INJECTION, SOLUTION INTRAMUSCULAR; INTRAVENOUS ONCE
Status: CANCELLED | OUTPATIENT
Start: 2025-07-21 | End: 2025-07-21

## 2025-07-21 RX ORDER — BUPIVACAINE HCL/0.9 % NACL/PF 0.125 %
PLASTIC BAG, INJECTION (ML) EPIDURAL AS NEEDED
Status: DISCONTINUED | OUTPATIENT
Start: 2025-07-21 | End: 2025-07-21 | Stop reason: SURG

## 2025-07-21 RX ORDER — MISOPROSTOL 200 UG/1
800 TABLET ORAL AS NEEDED
Status: DISCONTINUED | OUTPATIENT
Start: 2025-07-21 | End: 2025-07-21 | Stop reason: HOSPADM

## 2025-07-21 RX ORDER — OXYTOCIN/0.9 % SODIUM CHLORIDE 30/500 ML
250 PLASTIC BAG, INJECTION (ML) INTRAVENOUS CONTINUOUS
Status: ACTIVE | OUTPATIENT
Start: 2025-07-21 | End: 2025-07-21

## 2025-07-21 RX ORDER — KETOROLAC TROMETHAMINE 30 MG/ML
30 INJECTION, SOLUTION INTRAMUSCULAR; INTRAVENOUS ONCE
Status: COMPLETED | OUTPATIENT
Start: 2025-07-21 | End: 2025-07-21

## 2025-07-21 RX ADMIN — Medication 50 MCG: at 17:20

## 2025-07-21 RX ADMIN — FAMOTIDINE 20 MG: 10 INJECTION INTRAVENOUS at 16:51

## 2025-07-21 RX ADMIN — AZITHROMYCIN DIHYDRATE 500 MG: 500 INJECTION, POWDER, LYOPHILIZED, FOR SOLUTION INTRAVENOUS at 16:47

## 2025-07-21 RX ADMIN — Medication 100 MCG: at 17:19

## 2025-07-21 RX ADMIN — MISOPROSTOL 200 MCG: 200 TABLET ORAL at 19:43

## 2025-07-21 RX ADMIN — ACETAMINOPHEN 1000 MG: 500 TABLET ORAL at 16:45

## 2025-07-21 RX ADMIN — BUPIVACAINE HYDROCHLORIDE 1.5 ML: 7.5 INJECTION, SOLUTION EPIDURAL; RETROBULBAR at 17:14

## 2025-07-21 RX ADMIN — SODIUM CHLORIDE, POTASSIUM CHLORIDE, SODIUM LACTATE AND CALCIUM CHLORIDE: 600; 310; 30; 20 INJECTION, SOLUTION INTRAVENOUS at 18:45

## 2025-07-21 RX ADMIN — ONDANSETRON 8 MG: 2 INJECTION, SOLUTION INTRAMUSCULAR; INTRAVENOUS at 17:11

## 2025-07-21 RX ADMIN — KETOROLAC TROMETHAMINE 30 MG: 30 INJECTION, SOLUTION INTRAMUSCULAR; INTRAVENOUS at 20:18

## 2025-07-21 RX ADMIN — SODIUM CHLORIDE, POTASSIUM CHLORIDE, SODIUM LACTATE AND CALCIUM CHLORIDE 125 ML/HR: 600; 310; 30; 20 INJECTION, SOLUTION INTRAVENOUS at 23:12

## 2025-07-21 RX ADMIN — TRANEXAMIC ACID 1000 MG: 10 INJECTION, SOLUTION INTRAVENOUS at 19:43

## 2025-07-21 RX ADMIN — ACETAMINOPHEN 1000 MG: 500 TABLET ORAL at 22:38

## 2025-07-21 RX ADMIN — Medication 100 MCG: at 18:05

## 2025-07-21 RX ADMIN — AZITHROMYCIN DIHYDRATE 500 MG: 500 INJECTION, POWDER, LYOPHILIZED, FOR SOLUTION INTRAVENOUS at 17:17

## 2025-07-21 RX ADMIN — SODIUM CHLORIDE, POTASSIUM CHLORIDE, SODIUM LACTATE AND CALCIUM CHLORIDE 1000 ML: 600; 310; 30; 20 INJECTION, SOLUTION INTRAVENOUS at 16:54

## 2025-07-21 RX ADMIN — MORPHINE SULFATE 0.1 MG: 0.5 INJECTION, SOLUTION EPIDURAL; INTRATHECAL; INTRAVENOUS at 17:14

## 2025-07-21 RX ADMIN — SODIUM CHLORIDE, POTASSIUM CHLORIDE, SODIUM LACTATE AND CALCIUM CHLORIDE: 600; 310; 30; 20 INJECTION, SOLUTION INTRAVENOUS at 17:42

## 2025-07-21 RX ADMIN — SODIUM CHLORIDE 2 G: 9 INJECTION, SOLUTION INTRAVENOUS at 16:48

## 2025-07-21 RX ADMIN — OXYTOCIN 40 UNITS: 10 INJECTION, SOLUTION INTRAMUSCULAR; INTRAVENOUS at 17:42

## 2025-07-21 RX ADMIN — PROCHLORPERAZINE EDISYLATE 2.5 MG: 5 INJECTION INTRAMUSCULAR; INTRAVENOUS at 20:39

## 2025-07-21 NOTE — SIGNIFICANT NOTE
Dr Olvera notified of pts bp and reactive nst. Md states may d/c home keep office visit this afternoon

## 2025-07-21 NOTE — NON STRESS TEST
Obstetrical Non-stress Test Interpretation     Name:  Anna Silva  MRN: [id number]    30 y.o. female  at 38w5d    Indication: elevated BP      Fetal Assessment  Fetal Movement: active  Fetal HR Assessment Method: external  Fetal HR (beats/min): 145  Fetal HR Baseline: normal range  Fetal HR Variability: moderate (amplitude range 6 to 25 bpm)  Fetal HR Accelerations: greater than/equal to 15 bpm, lasting at least 15 seconds  Fetal HR Decelerations: absent  Sinusoidal Pattern Present: absent  Fetal HR Tracing Category: Category I    /75   Pulse 88   LMP 10/31/2024   SpO2 98%     Reason for test: OB Triage, Hypertension  Date of Test: 2025  Time frame of test:   RN NST Interpretation: Reactive      Izaiah Kaur RN  2025  09:45 EDT

## 2025-07-21 NOTE — ANESTHESIA PREPROCEDURE EVALUATION
Anesthesia Evaluation     Patient summary reviewed and Nursing notes reviewed   no history of anesthetic complications:   NPO Solid Status: > 8 hours  NPO Liquid Status: > 2 hours           Airway   Mallampati: II  TM distance: >3 FB  Dental - normal exam     Pulmonary - negative pulmonary ROS and normal exam   Cardiovascular - normal exam  Exercise tolerance: good (4-7 METS)        Neuro/Psych- negative ROS  GI/Hepatic/Renal/Endo    (+) GERD well controlled    Musculoskeletal (-) negative ROS    Abdominal    Substance History - negative use     OB/GYN    (+) Pregnant, pregnancy induced hypertension        Other - negative ROS       ROS/Med Hx Other: PAT Nursing Notes unavailable.                     Anesthesia Plan    ASA 3     spinal     (RCS scheduled 7/29 moved to 7/21  PI BP at NST on 7/21 129/75  H/O low PLT, on 7/19 171  ASA 81mg)  intravenous induction     Anesthetic plan, risks, benefits, and alternatives have been provided, discussed and informed consent has been obtained with: patient.  Pre-procedure education provided  Plan discussed with CRNA.        CODE STATUS:

## 2025-07-21 NOTE — OP NOTE
OB Operative Note        Date of Service:  2025    Pre-Operative Dx:   IUP at Gestational Age: 38w5d weeks    indication: GHTN  Prior , declines   Early labor    Postoperative dx:   Same as above  Extremely blunted tissue margins rectus and fascia    Procedure:   Repeat LTCS    Surgeon:  Jane Olvera DO    Assistant:  Dr. Estelle Densie [facility] Hospitalist was responsible for performing the following activities:   Retraction, Suction, Irrigation, Fundal pressure to assist with delivery of the fetus, Closure of the right side of the fascia and their skilled assistance was necessary for the success of this case.     No resident assist available.    Anesthesia:  Spinal  CRNA: Darlyn Shane CRNA    Estimated Blood Loss: 800 mls    Findings:   Normal uterus, Normal tubes, Normal ovaries, Normal placenta, centrally inserting cord, blunted tissue margins fascia and rectus muscles    Infant: Gender:  male infant   Weight:   3620 g (7 lb 15.7 oz)   APGARS:  5  @ 1 minute / 8  @ 5 minutes    Specimens:    Cord blood, cord gases     Procedure Details:  The patient was brought to the operating room and spinal anesthesia was deemed to be adequate.  She was prepped and draped in a normal sterile fashion and placed in supine position with a leftward tilt.  A Pfannenstiel skin incision was made approximately 2 fingerbreadths above the symphysis pubis and carried down to the underlying layer of fascia.  Her old skin incision was excised and discarded.  The fascia was nicked in the midline and extended laterally with Durbin scissors.  The superior and inferior aspects of the fascial incision were grasped with Kocher clamps and the underlying rectus muscle was dissected off bluntly and sharply.  This is where a significant amount of adhesions and blunted tissue margins were noted.  The midline was identified and opened in a sharp fashion with no noted damage to underlying bowel, bladder, blood vessels, or organs.   The vesicouterine peritoneum was incised and the bladder flap was created.  The lower uterine segment was incised in a transverse fashion and extended laterally with bandage scissors.  Fluid was noted to be clear.  The fetal vertex was brought up atraumatically through the uterine incision.  The mouth and nares were bulb suctioned.  There was a loose nuchal cord.  It was easily reduced over the fetal vertex.  The left anterior and right posterior shoulders were delivered easily.  The remainder of the body delivered.  The infant was not vigorous and handed to baby care after the cord was clamped and cut.  A segment of cord was handed off to the technician for collection of cord blood and cord gases.  The placenta delivered with the assistance of fundal massage and was discarded.  The uterus was exteriorized and cleared of all clots and debris.  The uterine incision was repaired with 0 Vicryl in a running fashion.  A second imbricating stitch was placed.  Several figure-of-eight stitches for hemostasis.  Left corner with small amount of persistent oozing repaired with 4-0 Monocryl in a running fashion.  Excellent hemostasis was assured.  Cytotec 200mcg SL and 200mcg PO given to assist w uterine tone.     The uterus was placed back into the pelvic cavity.  Copious irrigation was performed.  The gutters were cleared of all clot and debris.  The uterine incision was reinspected off tension and noted to be hemostatic.  Interceed was placed over the uterine incision and anterior uterus.  The parietal peritoneum was closed.  The rectus muscles were reapproximated in the midline.  The rectus muscles and fascia were noted to be hemostatic.  The fascia was closed with 0 Vicryl in a running fashion starting at the bilateral angles and to the midline.  The subcutaneous tissue was copiously irrigated and made hemostatic.  Persistent oozing throughout the subcutaneous tissue was encountered.  Bovie cautery was used.  Glenn was  used.  It was reapproximated using monocryl and the skin was closed with staples.  Urine was clear at the end of the procedure.     The patient tolerated the procedure well.  Instrument, lap, and needle counts were correct.  The patient received antibiotics prior to the procedure, Azithromycin.  She was taken to the recovery room in stable condition.      Complications: None     Condition: Good     Disposition:  PACU           Electronically signed by:  Jane Olvera DO, 07/21/25, 7:27 PM EDT.

## 2025-07-21 NOTE — H&P (VIEW-ONLY)
OB HISTORY AND PHYSICAL      SUBJECTIVE:    30 y.o. female  currently at 38w5d [facility] complicated by:      Patient Active Problem List    Diagnosis     Elevated blood pressure reading [R03.0]     Normal labor [O80, Z37.9]     Vaginal bleeding in pregnancy, third trimester [O46.93]     GBS (group B Streptococcus carrier), +RV culture, currently pregnant [O99.820]     Unstable lie of fetus [O32.0XX0]     Borderline platelets at Glucola and history of low platelets with G1 [R89.9]     Elevated glucose tolerance test [R73.09]     History of cold sores [Z86.19]     Supervision of other normal pregnancy, antepartum [Z34.80]     History of gestational hypertension [Z87.59]     Previous  section [Z98.891]     Hx of postpartum hemorrhage, currently pregnant [O09.299]        CC/HPI:  Presents with vaginal bleeding, cramping and contractions and elevated blood pressure.     Was seen on labor and delivery on Saturday,  for vaginal bleeding.  Had sexual activity at 1:00 that day but no penetration.  Was cramping prior to sexual activity.  And then started having vaginal bleeding about 6 PM.  It was bright red and in the toilet.  Passed a small clot was evaluated on labor and delivery.  Had no further vaginal bleeding cervix was 1 cm dilated.  Blood pressures mildly elevated but anxious.  Labs normal except urine PC 0.3.  Follow-up blood pressures have been within normal limits including today.  Denies hypertensive symptoms.  Has had brownish thick mucousy discharge and continues to have cramping and contractions.  Cervix is changed to 2 cm dilated.    ROS: No leaking fluid, Is feeling adequate FM, No HA, No scotomata or vision changes, No RUQ/epigastric pain, and No swelling    Past OB History:   OB History    Para Term  AB Living   2 1 1   1   SAB IAB Ectopic Molar Multiple Live Births       0 1      # Outcome Date GA Lbr Amilcar/2nd Weight Sex Type Anes PTL Lv   2 Current            1 Term  01/06/22 39w0d  3630 g (8 lb) F CS-LTranv EPI N LAY      Complications: Intraamniotic Infection, Postpartum Hemorrhage, Cephalopelvic Disproportion        Prenatal Labs:    Prenatal Results       Initial Prenatal Labs       Test Value Reference Range Date Time    Hemoglobin  13.5 g/dL 12.0 - 15.9 01/07/25 1046    Hematocrit  40.6 % 34.0 - 46.6 01/07/25 1046    Platelets  185 10*3/mm3 140 - 450 01/07/25 1046    Rubella IgG  7.68 index Immune >0.99 01/07/25 1046    Hepatitis B SAg  Non-Reactive  Non-Reactive 01/07/25 1046    Hepatitis C Ab  Non-Reactive  Non-Reactive 01/07/25 1046    RPR  Non-Reactive  Non-Reactive 01/07/25 1046    T. Pallidum Ab   Non-Reactive  Non-Reactive 04/14/25 1408    ABO  O   01/07/25 1046    Rh  Positive   01/07/25 1046    Antibody Screen  Negative   01/07/25 1046    HIV  Non-Reactive  Non-Reactive 01/07/25 1046    Urine Culture  No growth   01/07/25 1046    Gonorrhea  Negative  Negative 01/07/25 1046    Chlamydia  Negative  Negative 01/07/25 1046    TSH        HgB A1c         Varicella IgG        Hemoglobinopathy Fractionation  Comment   01/07/25 1046    Hemoglobinopathy (genetic testing)        Cystic fibrosis         Spinal muscular atrophy        Fragile X                  Fetal testing        Test Value Reference Range Date Time    NIPT        MSAFP        AFP-4                  2nd and 3rd Trimester       Test Value Reference Range Date Time    Hemoglobin (repeated)  12.3 g/dL 12.0 - 15.9 07/19/25 1958       11.5 g/dL 12.0 - 15.9 06/16/25 1505       11.4 g/dL 12.0 - 15.9 04/14/25 1408    Hematocrit (repeated)  37.2 % 34.0 - 46.6 07/19/25 1958       36.4 % 34.0 - 46.6 06/16/25 1505       33.5 % 34.0 - 46.6 04/14/25 1408    Platelets   171 10*3/mm3 140 - 450 07/19/25 1958       157 10*3/mm3 140 - 450 06/16/25 1505       157 10*3/mm3 140 - 450 04/14/25 1408       185 10*3/mm3 140 - 450 01/07/25 1046    1 hour GTT   130 mg/dL 65 - 139 04/14/25 1408    Antibody Screen (repeated)        3rd  TM syphilis scrn (repeated)  RPR         3rd TM syphilis scrn (repeated) TP-Ab        3rd TM syphilis screen TB-Ab (FTA)        Syphilis cascade test TP-Ab (EIA)        Syphilis cascade TPPA        GTT Fasting  92 mg/dL 65 - 94 25 0812    GTT 1 Hr  136 mg/dL 65 - 179 25 0929    GTT 2 Hr  117 mg/dL 65 - 154 25 1020    GTT 3 Hr  45 mg/dL 65 - 139 25 1134    Group B Strep  Positive  Negative 25 1318              Other testing        Test Value Reference Range Date Time    Parvo IgG         CMV IgG                   Drug Screening       Test Value Reference Range Date Time    Amphetamine Screen  Negative  Negative 25 1046    Barbiturate Screen  Negative  Negative 25 1046    Benzodiazepine Screen  Negative  Negative 25 1046    Methadone Screen  Negative  Negative 25 1046    Phencyclidine Screen  Negative  Negative 25 1046    Opiates Screen  Negative  Negative 25 1046    THC Screen  Negative  Negative 25 1046    Cocaine Screen  Negative  Negative 25 1046    Propoxyphene Screen        Buprenorphine Screen  Negative  Negative 25 1046    Methamphetamine Screen  Negative  Negative 25 1046    Oxycodone Screen  Negative  Negative 25 1046    Tricyclic Antidepressants Screen  Negative  Negative 25 1046                 PMHx:    Past Medical History:   Diagnosis Date    Anemia     Antepartum chorioamnionitis 2022    Gestational hypertension     Herpes     History of cold sores     Single delivery by  2022       Medications:  Magnesium, prenatal vitamin 27-0.8, and valACYclovir    Allergies:  No Known Allergies    PSHx:    Past Surgical History:   Procedure Laterality Date     SECTION N/A 2022    Procedure:  SECTION PRIMARY;  Surgeon: Jane Olvera DO;  Location: [facility] LABOR DELIVERY;  Service: Obstetrics/Gynecology;  Laterality: N/A;    WISDOM TOOTH EXTRACTION         Social History:     reports that she has never smoked. She has never been exposed to tobacco smoke. She has never used smokeless tobacco. She reports that she does not drink alcohol and does not use drugs.    Family History: Non contributory    Immunizations: See prenatal record for Tdap, Flu, Covid and/or other vaccinations    PHYSICAL EXAM:   Heart Rate:  [88-96] 88  BP: (129-132)/(75-89) 132/89  Chaperone present during breast and/or pelvic/cervical exam if performed.  General- NAD, alert and oriented, appropriate  Psych- normal mood, good memory  Cardiovascular- Regular rhythm, no murnurs  Respiratory- CTA to bases, no wheezes  Abdomen- Gravid, non tender  Fundus-  Non tender.  Size: consistent with dates  Cervix- 2cm / 50% / -3, VTX, thick mucous with old blood   Presentation- VTX  Extremities/DTRs- No edema, bilaterally equal, no signs of DVT    Fetal HR: NST today  Contractions: Irregular    Lab/Imaging/Other:   Results from last 7 days   Lab Units 25   WBC 10*3/mm3 9.17   HEMOGLOBIN g/dL 12.3   HEMATOCRIT % 37.2   PLATELETS 10*3/mm3 171       Results from last 7 days   Lab Units 25   GLUCOSE mg/dL 72   CREATININE mg/dL 0.70   SODIUM mmol/L 136   POTASSIUM mmol/L 3.9   CHLORIDE mmol/L 102   AST (SGOT) U/L 17   ALT (SGPT) U/L 11       Lab Results   Component Value Date    CREATININEUR 20.3 2025    PROTEINUR 6.1 2025    UTPCR 0.30 2025        ASSESSMENT:  38w5d  Early labor, vag bleeding in third trimester  Prior CS declines     Lab Results   Component Value Date    STREPGPB Positive (A) 2025       PLAN:  Admit  Delivery:     Ancef and Azithro    Plan of care [facility] hospital course, R/B/A/potential SE, suspected length have been reviewed with patient and any family or friends present, questions answered to her/his/their satisfaction.  Pt desires to proceed as above.    Counseling:The patient was counseled on the risks, benefits and alternatives of a .  Risks  reviewed, but are not limited to: anesthesia, bleeding, transfusion, hysterectomy, infection, damage to organs, reoperation, wound separation, blood clots, and death.  She declines the alternatives and desires a .  All her questions have been answered to her satisfaction and she desires to proceed.         Electronically signed by Jane Olvera DO, 25, 1:28 PM EDT.    MARIA D [facility]INGTON [facility] ORDERS ONLY  [address] [place] [address]  Dept: [phone]  Loc: [phone]

## 2025-07-21 NOTE — H&P
OB HISTORY AND PHYSICAL      SUBJECTIVE:    30 y.o. female  currently at 38w5d [facility] complicated by:      Patient Active Problem List    Diagnosis     Elevated blood pressure reading [R03.0]     Normal labor [O80, Z37.9]     Vaginal bleeding in pregnancy, third trimester [O46.93]     GBS (group B Streptococcus carrier), +RV culture, currently pregnant [O99.820]     Unstable lie of fetus [O32.0XX0]     Borderline platelets at Glucola and history of low platelets with G1 [R89.9]     Elevated glucose tolerance test [R73.09]     History of cold sores [Z86.19]     Supervision of other normal pregnancy, antepartum [Z34.80]     History of gestational hypertension [Z87.59]     Previous  section [Z98.891]     Hx of postpartum hemorrhage, currently pregnant [O09.299]        CC/HPI:  Presents with vaginal bleeding, cramping and contractions and elevated blood pressure.     Was seen on labor and delivery on Saturday,  for vaginal bleeding.  Had sexual activity at 1:00 that day but no penetration.  Was cramping prior to sexual activity.  And then started having vaginal bleeding about 6 PM.  It was bright red and in the toilet.  Passed a small clot was evaluated on labor and delivery.  Had no further vaginal bleeding cervix was 1 cm dilated.  Blood pressures mildly elevated but anxious.  Labs normal except urine PC 0.3.  Follow-up blood pressures have been within normal limits including today.  Denies hypertensive symptoms.  Has had brownish thick mucousy discharge and continues to have cramping and contractions.  Cervix is changed to 2 cm dilated.    ROS: No leaking fluid, Is feeling adequate FM, No HA, No scotomata or vision changes, No RUQ/epigastric pain, and No swelling    Past OB History:   OB History    Para Term  AB Living   2 1 1   1   SAB IAB Ectopic Molar Multiple Live Births       0 1      # Outcome Date GA Lbr Amilcar/2nd Weight Sex Type Anes PTL Lv   2 Current            1 Term  01/06/22 39w0d  3630 g (8 lb) F CS-LTranv EPI N LAY      Complications: Intraamniotic Infection, Postpartum Hemorrhage, Cephalopelvic Disproportion        Prenatal Labs:    Prenatal Results       Initial Prenatal Labs       Test Value Reference Range Date Time    Hemoglobin  13.5 g/dL 12.0 - 15.9 01/07/25 1046    Hematocrit  40.6 % 34.0 - 46.6 01/07/25 1046    Platelets  185 10*3/mm3 140 - 450 01/07/25 1046    Rubella IgG  7.68 index Immune >0.99 01/07/25 1046    Hepatitis B SAg  Non-Reactive  Non-Reactive 01/07/25 1046    Hepatitis C Ab  Non-Reactive  Non-Reactive 01/07/25 1046    RPR  Non-Reactive  Non-Reactive 01/07/25 1046    T. Pallidum Ab   Non-Reactive  Non-Reactive 04/14/25 1408    ABO  O   01/07/25 1046    Rh  Positive   01/07/25 1046    Antibody Screen  Negative   01/07/25 1046    HIV  Non-Reactive  Non-Reactive 01/07/25 1046    Urine Culture  No growth   01/07/25 1046    Gonorrhea  Negative  Negative 01/07/25 1046    Chlamydia  Negative  Negative 01/07/25 1046    TSH        HgB A1c         Varicella IgG        Hemoglobinopathy Fractionation  Comment   01/07/25 1046    Hemoglobinopathy (genetic testing)        Cystic fibrosis         Spinal muscular atrophy        Fragile X                  Fetal testing        Test Value Reference Range Date Time    NIPT        MSAFP        AFP-4                  2nd and 3rd Trimester       Test Value Reference Range Date Time    Hemoglobin (repeated)  12.3 g/dL 12.0 - 15.9 07/19/25 1958       11.5 g/dL 12.0 - 15.9 06/16/25 1505       11.4 g/dL 12.0 - 15.9 04/14/25 1408    Hematocrit (repeated)  37.2 % 34.0 - 46.6 07/19/25 1958       36.4 % 34.0 - 46.6 06/16/25 1505       33.5 % 34.0 - 46.6 04/14/25 1408    Platelets   171 10*3/mm3 140 - 450 07/19/25 1958       157 10*3/mm3 140 - 450 06/16/25 1505       157 10*3/mm3 140 - 450 04/14/25 1408       185 10*3/mm3 140 - 450 01/07/25 1046    1 hour GTT   130 mg/dL 65 - 139 04/14/25 1408    Antibody Screen (repeated)        3rd  TM syphilis scrn (repeated)  RPR         3rd TM syphilis scrn (repeated) TP-Ab        3rd TM syphilis screen TB-Ab (FTA)        Syphilis cascade test TP-Ab (EIA)        Syphilis cascade TPPA        GTT Fasting  92 mg/dL 65 - 94 25 0812    GTT 1 Hr  136 mg/dL 65 - 179 25 0929    GTT 2 Hr  117 mg/dL 65 - 154 25 1020    GTT 3 Hr  45 mg/dL 65 - 139 25 1134    Group B Strep  Positive  Negative 25 1318              Other testing        Test Value Reference Range Date Time    Parvo IgG         CMV IgG                   Drug Screening       Test Value Reference Range Date Time    Amphetamine Screen  Negative  Negative 25 1046    Barbiturate Screen  Negative  Negative 25 1046    Benzodiazepine Screen  Negative  Negative 25 1046    Methadone Screen  Negative  Negative 25 1046    Phencyclidine Screen  Negative  Negative 25 1046    Opiates Screen  Negative  Negative 25 1046    THC Screen  Negative  Negative 25 1046    Cocaine Screen  Negative  Negative 25 1046    Propoxyphene Screen        Buprenorphine Screen  Negative  Negative 25 1046    Methamphetamine Screen  Negative  Negative 25 1046    Oxycodone Screen  Negative  Negative 25 1046    Tricyclic Antidepressants Screen  Negative  Negative 25 1046                 PMHx:    Past Medical History:   Diagnosis Date    Anemia     Antepartum chorioamnionitis 2022    Gestational hypertension     Herpes     History of cold sores     Single delivery by  2022       Medications:  Magnesium, prenatal vitamin 27-0.8, and valACYclovir    Allergies:  No Known Allergies    PSHx:    Past Surgical History:   Procedure Laterality Date     SECTION N/A 2022    Procedure:  SECTION PRIMARY;  Surgeon: Jane Olvera DO;  Location: [facility] LABOR DELIVERY;  Service: Obstetrics/Gynecology;  Laterality: N/A;    WISDOM TOOTH EXTRACTION         Social History:     reports that she has never smoked. She has never been exposed to tobacco smoke. She has never used smokeless tobacco. She reports that she does not drink alcohol and does not use drugs.    Family History: Non contributory    Immunizations: See prenatal record for Tdap, Flu, Covid and/or other vaccinations    PHYSICAL EXAM:   Heart Rate:  [88-96] 88  BP: (129-132)/(75-89) 132/89  Chaperone present during breast and/or pelvic/cervical exam if performed.  General- NAD, alert and oriented, appropriate  Psych- normal mood, good memory  Cardiovascular- Regular rhythm, no murnurs  Respiratory- CTA to bases, no wheezes  Abdomen- Gravid, non tender  Fundus-  Non tender.  Size: consistent with dates  Cervix- 2cm / 50% / -3, VTX, thick mucous with old blood   Presentation- VTX  Extremities/DTRs- No edema, bilaterally equal, no signs of DVT    Fetal HR: NST today  Contractions: Irregular    Lab/Imaging/Other:   Results from last 7 days   Lab Units 25   WBC 10*3/mm3 9.17   HEMOGLOBIN g/dL 12.3   HEMATOCRIT % 37.2   PLATELETS 10*3/mm3 171       Results from last 7 days   Lab Units 25   GLUCOSE mg/dL 72   CREATININE mg/dL 0.70   SODIUM mmol/L 136   POTASSIUM mmol/L 3.9   CHLORIDE mmol/L 102   AST (SGOT) U/L 17   ALT (SGPT) U/L 11       Lab Results   Component Value Date    CREATININEUR 20.3 2025    PROTEINUR 6.1 2025    UTPCR 0.30 2025        ASSESSMENT:  38w5d  Early labor, vag bleeding in third trimester  Prior CS declines     Lab Results   Component Value Date    STREPGPB Positive (A) 2025       PLAN:  Admit  Delivery:     Ancef and Azithro    Plan of care [facility] hospital course, R/B/A/potential SE, suspected length have been reviewed with patient and any family or friends present, questions answered to her/his/their satisfaction.  Pt desires to proceed as above.    Counseling:The patient was counseled on the risks, benefits and alternatives of a .  Risks  reviewed, but are not limited to: anesthesia, bleeding, transfusion, hysterectomy, infection, damage to organs, reoperation, wound separation, blood clots, and death.  She declines the alternatives and desires a .  All her questions have been answered to her satisfaction and she desires to proceed.         Electronically signed by Jane Olvera DO, 25, 1:28 PM EDT.    [facility] ORDERS ONLY  [address] [place] [address]  Dept: [phone]  Loc: [phone]

## 2025-07-21 NOTE — DISCHARGE INSTRUCTIONS
DR. ORTEGA'S POSTOP  DISCHARGE PRECAUTIONS and Answers to FAQs     NO SEX or tampons for SIX weeks.     NO DRIVING for TWO weeks. or while taking narcotic pain medications.     NO TUB BATH or POOL for FOUR weeks, shower only.       NO LIFTING more than 20 pounds for 2 week(s).     STAPLES (if present):  follow up at the office in the next 3-7 days to have them removed if they were not removed before discharge.  If your staples were removed already and steri-strips placed (or you just had steri-strips) REMOVE YOUR STERI STRIPS in TEN DAYS.      INCISION CARE:   WASH DAILY in the shower with soap and water (any type of soap is fine, it does not need to be antibacterial soap).  Look (or have a family member/friend look) at your incision EVERY DAY when you get home.  Keeping the incision DRY is extremely important.  Continue to keep a clean, dry wash cloth (to help wick away moisture) on your incision for 10 days.  Change out the wash cloth frequently (approximately every 2-8 hrs as needed to prevent it from getting moist).  REDNESS, PUS, increase in PAIN, FEVER or CHILLS are all reasons to be seen in our office immediately.  Go to the ER, if it is after hours or a weekend.         VAGINAL BLEEDING:  may continue on and off over the next several weeks after delivery and may increase slightly once you go home.  You should not be bleeding more than 1 large pad soaked every hour or two.  Clots (even the size of a lemon or larger) may be normal as long as the bleeding is not heavy and the clots do not continue.       FEVER or CHILLS or NOT FEELING WELL: call our office.  If the office is closed, you need to be seen in acute care or ER.       CHEST PAIN or SHORTNESS OF BREATH/AIR: you need to GO TO THE NEAREST ER or CALL 911.      SWELLING:  can increase over the next 7-10 days and then should slowly improve.  Your legs/ankles should be fairly similar in size.  A red, painful, hot, swollen leg (usually just one side)  can be a sign of a blood clot and should be evaluated immediately.  Call our office.  If it is after hours or a weekend, you must be seen IMMEDIATELY IN THE ER.      ELEVATED BLOOD PRESSURE:  you need to contact us if you are having  persistent elevated BP systolic (top number) more than 155 or diastolic (bottom number) more than 95, or a headache (not relieved with rest, hydration or over the counter pain reliever), an increase in your swelling (usually hands and face), changes in your vision (typically flashing white or black spots) or severe persistent pain in the location of the upper right side of your belly (under your right breast).  Call our office or go to ER if after hours or a weekend.     LACTATION QUESTIONS or CONCERNS?  Call [facility] Lactation support [phone].     WORK and SCHOOL TIME OFF: depends on your specific delivery type, surrounding circumstances, and your work insurance/school rules.  If you have questions, please call Mel or Leigh at [phone] (ext. [phone]).  Or email Mel at [email or web address].  They will assist in required paperwork for you and/or family members.      For further QUESTIONS or CONCERNS, please call [facility] ANDREA Mina at [phone].

## 2025-07-21 NOTE — PLAN OF CARE
Goal Outcome Evaluation:         Delivered by repeat C/S without complications

## 2025-07-21 NOTE — ANESTHESIA PROCEDURE NOTES
Spinal Block    Pre-sedation assessment completed: 7/21/2025 3:30 PM    Patient reassessed immediately prior to procedure    Patient location during procedure: OB  Start Time: 7/21/2025 5:12 PM  Stop Time: 7/21/2025 5:14 PM  Indication:at surgeon's request and post-op pain management  Performed By  CRNA/CAA: Darlyn Shane CRNA  Preanesthetic Checklist  Completed: patient identified, IV checked, risks and benefits discussed, surgical consent, monitors and equipment checked, pre-op evaluation and timeout performed  Spinal Block Prep:  Patient Position:sitting  Sterile Tech:cap, gloves, mask and sterile barriers  Prep:Betadine  Patient Monitoring:blood pressure monitoring, continuous pulse oximetry and EKG    Spinal Block Procedure  Approach:midline  Guidance:landmark technique and palpation technique  Location:L3-L4  Needle Type:Pencan  Needle Gauge:24 G  Placement of Spinal needle event:cerebrospinal fluid aspirated  Paresthesia: no  Fluid Appearance:clear  Medications: bupivacaine PF (MARCAINE) injection 0.75% - Intrathecal   1.5 mL - 7/21/2025 5:14:00 PM  morphine PF (DURAMORPH) injection - Intrathecal   0.1 mg - 7/21/2025 5:14:00 PM   Post Assessment  Patient Tolerance:patient tolerated the procedure well with no apparent complications  Complications no  Additional Notes  Skin infiltration with Lidocaine 1%. Introducer inserted, followed by spinal needle. + CSF return. Intrathecal marcaine and PF duramorph/fentanyl injected (see eMAR). Spinal needle/introducer removed. Site clean/dry/intact.    Spinal placed with ease on first attempt, no redirecting

## 2025-07-21 NOTE — DISCHARGE SUMMARY
OB Discharge Summary        Admit Date:  2025  Date of Delivery: 2025  Discharge Date: 2025    Reason for Admission/Chief Complaint: Contractions and vaginal bleeding    Final Diagnosis:  38+5, early labor  GHTN without severe features  Prior , declines   Repeat , blunted tissue margins of fascia and muscle  Gestational thrombocytopenia  Hx herpes labialis on prophylaxis  Baby NICU- pneumothorax    To do at FU: Blood pressure check, remove staples, FU CBC, routine postpartum  Pending Results       None            Antepartum:  Prenatal care is complicated by:  See above Final Diagnosis for list    Intrapartum/Delivery:  OB Surgeon:  Dr. Jane Olvera, DO  Anesthesia: Spinal  Delivery Type:  LTCS  Feeding method: Breast    Infant: male infant; Robert    Weight: 3620 g (7 lb 15.7 oz)     APGARS: 5  @ 1 minute / 8  @ 5 minutes    Hospital Course/Significant Findings:  Patient arrived for scheduled office visit.  Had been seen a couple days prior for vaginal bleeding and not associated with any penetrative intercourse.  Bleeding had resolved but contractions persisted.  Cervix had changed from 1 to 2 cm dilated.  Met criteria for GHTN without severe features.  Surgery uncomplicated.  Findings significant for extremely blunted tissue margins in the subcutaneous tissue to the rectus muscles through the fascia.  No significant adhesions intra-abdominal.  Infant required PPV after delivery.  Uncomplicated Postop.  Bps occas mild range only.  Baby NICU for pneumothorax.  Desired dc to rooming in on postop day 2.       Results from last 7 days   Lab Units 25  0509 25  1448 25   WBC 10*3/mm3 12.32* 7.59 9.17   HEMOGLOBIN g/dL 9.9* 12.4 12.3   HEMATOCRIT % 29.7* 36.6 37.2   PLATELETS 10*3/mm3 127* 157 171       Results from last 7 days   Lab Units 25  1448 25   GLUCOSE mg/dL 77 72   CREATININE mg/dL 0.55* 0.70   SODIUM mmol/L 134* 136    POTASSIUM mmol/L 3.8 3.9   CHLORIDE mmol/L 104 102   AST (SGOT) U/L 16 17   ALT (SGPT) U/L 10 11       Results from last 7 days   Lab Units 07/21/25  1448   TREPONEMA PALLIDUM AB TOTAL  Non-Reactive       Results for orders placed or performed during the hospital encounter of 07/21/25   CBC (No Diff)    Collection Time: 07/21/25  2:48 PM    Specimen: Blood   Result Value Ref Range    WBC 7.59 3.40 - 10.80 10*3/mm3    RBC 4.28 3.77 - 5.28 10*6/mm3    Hemoglobin 12.4 12.0 - 15.9 g/dL    Hematocrit 36.6 34.0 - 46.6 %    MCV 85.5 79.0 - 97.0 fL    MCH 29.0 26.6 - 33.0 pg    MCHC 33.9 31.5 - 35.7 g/dL    RDW 13.2 12.3 - 15.4 %    RDW-SD 40.0 37.0 - 54.0 fl    MPV 10.8 6.0 - 12.0 fL    Platelets 157 140 - 450 10*3/mm3   Treponema pallidum AB w/Reflex RPR    Collection Time: 07/21/25  2:48 PM    Specimen: Blood   Result Value Ref Range    Treponemal AB Total Non-Reactive Non-Reactive   Urine Drug Screen - Urine, Clean Catch    Collection Time: 07/21/25  2:48 PM    Specimen: Urine, Clean Catch   Result Value Ref Range    THC, Screen, Urine Negative Negative    Phencyclidine (PCP), Urine Negative Negative    Cocaine Screen, Urine Negative Negative    Methamphetamine, Ur Negative Negative    Opiate Screen Negative Negative    Amphetamine Screen, Urine Negative Negative    Benzodiazepine Screen, Urine Negative Negative    Tricyclic Antidepressants Screen Negative Negative    Methadone Screen, Urine Negative Negative    Barbiturates Screen, Urine Negative Negative    Oxycodone Screen, Urine Negative Negative    Buprenorphine, Screen, Urine Negative Negative   Comprehensive Metabolic Panel    Collection Time: 07/21/25  2:48 PM    Specimen: Blood   Result Value Ref Range    Glucose 77 65 - 99 mg/dL    BUN 10.1 6.0 - 20.0 mg/dL    Creatinine 0.55 (L) 0.57 - 1.00 mg/dL    Sodium 134 (L) 136 - 145 mmol/L    Potassium 3.8 3.5 - 5.2 mmol/L    Chloride 104 98 - 107 mmol/L    CO2 18.4 (L) 22.0 - 29.0 mmol/L    Calcium 8.8 8.6 - 10.5  mg/dL    Total Protein 6.6 6.0 - 8.5 g/dL    Albumin 3.7 3.5 - 5.2 g/dL    ALT (SGPT) 10 1 - 33 U/L    AST (SGOT) 16 1 - 32 U/L    Alkaline Phosphatase 199 (H) 39 - 117 U/L    Total Bilirubin 0.2 0.0 - 1.2 mg/dL    Globulin 2.9 gm/dL    A/G Ratio 1.3 g/dL    BUN/Creatinine Ratio 18.4 7.0 - 25.0    Anion Gap 11.6 5.0 - 15.0 mmol/L    eGFR 125.1 >60.0 mL/min/1.73   Protein / Creatinine Ratio, Urine - Urine, Clean Catch    Collection Time: 07/21/25  2:48 PM    Specimen: Urine, Clean Catch   Result Value Ref Range    Creatinine, Urine 31.2 mg/dL    Total Protein, Urine 5.5 mg/dL    Protein/Creatinine Ratio, Urine 0.18    Fentanyl, Urine - Urine, Clean Catch    Collection Time: 07/21/25  2:48 PM    Specimen: Urine, Clean Catch   Result Value Ref Range    Fentanyl, Urine Negative Negative   Type & Screen    Collection Time: 07/21/25  2:48 PM    Specimen: Blood   Result Value Ref Range    ABO Type O     RH type Positive     Antibody Screen Negative     T&S Expiration Date 7/24/2025 11:59:59 PM    Blood Gas, Venous, Cord    Collection Time: 07/21/25  5:55 PM    Specimen: Umbilical Cord; Cord Blood Venous   Result Value Ref Range    pH, Cord Venous 7.324 7.260 - 7.400 pH Units    pCO2, Cord Venous 46.3 35.0 - 51.3 mm Hg    pO2, Cord Venous 31.6 19.0 - 39.0 mm Hg    HCO3, Cord Venous 24.1 mmol/L    Base Excess, Cord Venous -2.2 (L) -2.0 - 2.0 mmol/L    O2 Sat, Cord Venous 55.6 %    Barometric Pressure for Blood Gas 742.7000 mmHg    Modality Room Air    Blood Gas, Arterial, Cord    Collection Time: 07/21/25  5:57 PM    Specimen: Umbilical Cord; Cord Blood Arterial   Result Value Ref Range    pH, Cord Arterial 7.33 7.18 - 7.34 pH Units    pCO2, Cord Arterial 42.0 33.0 - 49.0 mmHg    pO2, Cord Arterial 32.1 mmHg    HCO3, Cord Arterial 22.2 mmol/L    Base Exc, Cord Arterial -3.6 (L) -2.0 - 2.0 mmol/L    O2 Sat, Cord Arterial 57.4 %    Barometric Pressure for Blood Gas 740.9000 mmHg    Modality Room Air    CBC Auto Differential     Collection Time: 07/22/25  5:09 AM    Specimen: Blood   Result Value Ref Range    WBC 12.32 (H) 3.40 - 10.80 10*3/mm3    RBC 3.40 (L) 3.77 - 5.28 10*6/mm3    Hemoglobin 9.9 (L) 12.0 - 15.9 g/dL    Hematocrit 29.7 (L) 34.0 - 46.6 %    MCV 87.4 79.0 - 97.0 fL    MCH 29.1 26.6 - 33.0 pg    MCHC 33.3 31.5 - 35.7 g/dL    RDW 13.0 12.3 - 15.4 %    RDW-SD 40.9 37.0 - 54.0 fl    MPV 11.1 6.0 - 12.0 fL    Platelets 127 (L) 140 - 450 10*3/mm3    Neutrophil % 81.7 (H) 42.7 - 76.0 %    Lymphocyte % 10.8 (L) 19.6 - 45.3 %    Monocyte % 6.3 5.0 - 12.0 %    Eosinophil % 0.6 0.3 - 6.2 %    Basophil % 0.3 0.0 - 1.5 %    Immature Grans % 0.3 0.0 - 0.5 %    Neutrophils, Absolute 10.07 (H) 1.70 - 7.00 10*3/mm3    Lymphocytes, Absolute 1.33 0.70 - 3.10 10*3/mm3    Monocytes, Absolute 0.77 0.10 - 0.90 10*3/mm3    Eosinophils, Absolute 0.07 0.00 - 0.40 10*3/mm3    Basophils, Absolute 0.04 0.00 - 0.20 10*3/mm3    Immature Grans, Absolute 0.04 0.00 - 0.05 10*3/mm3    nRBC 0.0 0.0 - 0.2 /100 WBC       Lab Results   Component Value Date    CREATININEUR 31.2 07/21/2025    PROTEINUR 5.5 07/21/2025    UTPCR 0.18 07/21/2025          Discharge:         Discharge Medications        New Medications        Instructions Start Date   acetaminophen 325 MG tablet  Commonly known as: Tylenol   650 mg, Oral, Every 6 Hours PRN      docusate sodium 100 MG capsule  Commonly known as: Colace   100 mg, Oral, 2 Times Daily PRN      FeroSul 325 (65 Fe) MG tablet  Generic drug: ferrous sulfate   325 mg, Oral, Every Other Day      ibuprofen 600 MG tablet  Commonly known as: ADVIL,MOTRIN   600 mg, Oral, Every 6 Hours PRN             Continue These Medications        Instructions Start Date   MAGNESIUM PO   Take  by mouth.      prenatal vitamin 27-0.8 27-0.8 MG tablet tablet   1 tablet, Daily      valACYclovir 1000 MG tablet  Commonly known as: VALTREX   1,000 mg, Oral, Daily                 Disposition: Home  Diet: Regular    Activity: Pelvic rest 6 weeks     Condition at discharge: Good    Follow up with: Dr. Jane Olvera DO or provider of her choice    Follow up in: BP check 1 week.  Staple removal 1 week      Complications: None       Signature: Electronically signed by Jane Olvera DO, 07/23/25, 1:12 PM EDT.        [facility] LABOR AND DELIVERY  [address] [place] [address] [place] [address]  Dept: [phone]  Dept Fax: [phone]  Loc: [phone]

## 2025-07-22 PROBLEM — D69.6 BENIGN GESTATIONAL THROMBOCYTOPENIA IN THIRD TRIMESTER: Status: ACTIVE | Noted: 2025-07-22

## 2025-07-22 PROBLEM — O99.113 BENIGN GESTATIONAL THROMBOCYTOPENIA IN THIRD TRIMESTER: Status: ACTIVE | Noted: 2025-07-22

## 2025-07-22 LAB
BASOPHILS # BLD AUTO: 0.04 10*3/MM3 (ref 0–0.2)
BASOPHILS NFR BLD AUTO: 0.3 % (ref 0–1.5)
DEPRECATED RDW RBC AUTO: 40.9 FL (ref 37–54)
EOSINOPHIL # BLD AUTO: 0.07 10*3/MM3 (ref 0–0.4)
EOSINOPHIL NFR BLD AUTO: 0.6 % (ref 0.3–6.2)
ERYTHROCYTE [DISTWIDTH] IN BLOOD BY AUTOMATED COUNT: 13 % (ref 12.3–15.4)
HCT VFR BLD AUTO: 29.7 % (ref 34–46.6)
HGB BLD-MCNC: 9.9 G/DL (ref 12–15.9)
IMM GRANULOCYTES # BLD AUTO: 0.04 10*3/MM3 (ref 0–0.05)
IMM GRANULOCYTES NFR BLD AUTO: 0.3 % (ref 0–0.5)
LYMPHOCYTES # BLD AUTO: 1.33 10*3/MM3 (ref 0.7–3.1)
LYMPHOCYTES NFR BLD AUTO: 10.8 % (ref 19.6–45.3)
MCH RBC QN AUTO: 29.1 PG (ref 26.6–33)
MCHC RBC AUTO-ENTMCNC: 33.3 G/DL (ref 31.5–35.7)
MCV RBC AUTO: 87.4 FL (ref 79–97)
MONOCYTES # BLD AUTO: 0.77 10*3/MM3 (ref 0.1–0.9)
MONOCYTES NFR BLD AUTO: 6.3 % (ref 5–12)
NEUTROPHILS NFR BLD AUTO: 10.07 10*3/MM3 (ref 1.7–7)
NEUTROPHILS NFR BLD AUTO: 81.7 % (ref 42.7–76)
NRBC BLD AUTO-RTO: 0 /100 WBC (ref 0–0.2)
PLATELET # BLD AUTO: 127 10*3/MM3 (ref 140–450)
PMV BLD AUTO: 11.1 FL (ref 6–12)
RBC # BLD AUTO: 3.4 10*6/MM3 (ref 3.77–5.28)
WBC NRBC COR # BLD AUTO: 12.32 10*3/MM3 (ref 3.4–10.8)

## 2025-07-22 PROCEDURE — 25010000002 KETOROLAC TROMETHAMINE PER 15 MG: Performed by: OBSTETRICS & GYNECOLOGY

## 2025-07-22 PROCEDURE — 85025 COMPLETE CBC W/AUTO DIFF WBC: CPT | Performed by: OBSTETRICS & GYNECOLOGY

## 2025-07-22 RX ORDER — DOCUSATE SODIUM 100 MG/1
100 CAPSULE, LIQUID FILLED ORAL 2 TIMES DAILY PRN
Qty: 60 CAPSULE | Refills: 0 | Status: SHIPPED | OUTPATIENT
Start: 2025-07-22 | End: 2025-08-04

## 2025-07-22 RX ORDER — KETOROLAC TROMETHAMINE 15 MG/ML
15 INJECTION, SOLUTION INTRAMUSCULAR; INTRAVENOUS EVERY 6 HOURS
Status: COMPLETED | OUTPATIENT
Start: 2025-07-22 | End: 2025-07-22

## 2025-07-22 RX ORDER — ACETAMINOPHEN 500 MG
1000 TABLET ORAL EVERY 6 HOURS
Status: COMPLETED | OUTPATIENT
Start: 2025-07-22 | End: 2025-07-22

## 2025-07-22 RX ORDER — IBUPROFEN 600 MG/1
600 TABLET, FILM COATED ORAL EVERY 6 HOURS
Status: DISCONTINUED | OUTPATIENT
Start: 2025-07-22 | End: 2025-07-23 | Stop reason: HOSPADM

## 2025-07-22 RX ORDER — FERROUS SULFATE 325(65) MG
325 TABLET ORAL EVERY OTHER DAY
Qty: 15 TABLET | Refills: 0 | Status: SHIPPED | OUTPATIENT
Start: 2025-07-22

## 2025-07-22 RX ORDER — DIPHENHYDRAMINE HYDROCHLORIDE 50 MG/ML
12.5 INJECTION, SOLUTION INTRAMUSCULAR; INTRAVENOUS EVERY 6 HOURS PRN
Status: ACTIVE | OUTPATIENT
Start: 2025-07-22 | End: 2025-07-23

## 2025-07-22 RX ORDER — ONDANSETRON 2 MG/ML
4 INJECTION INTRAMUSCULAR; INTRAVENOUS EVERY 6 HOURS PRN
Status: ACTIVE | OUTPATIENT
Start: 2025-07-22 | End: 2025-07-23

## 2025-07-22 RX ORDER — NALOXONE HCL 0.4 MG/ML
0.4 VIAL (ML) INJECTION ONCE AS NEEDED
Status: ACTIVE | OUTPATIENT
Start: 2025-07-22 | End: 2025-07-23

## 2025-07-22 RX ORDER — ACETAMINOPHEN 325 MG/1
650 TABLET ORAL EVERY 6 HOURS
Status: DISCONTINUED | OUTPATIENT
Start: 2025-07-23 | End: 2025-07-23 | Stop reason: HOSPADM

## 2025-07-22 RX ORDER — DIPHENHYDRAMINE HCL 25 MG
25 CAPSULE ORAL EVERY 6 HOURS PRN
Status: ACTIVE | OUTPATIENT
Start: 2025-07-22 | End: 2025-07-23

## 2025-07-22 RX ADMIN — VALACYCLOVIR 1000 MG: 500 TABLET ORAL at 10:19

## 2025-07-22 RX ADMIN — DOCUSATE SODIUM 100 MG: 100 CAPSULE, LIQUID FILLED ORAL at 10:19

## 2025-07-22 RX ADMIN — ACETAMINOPHEN 1000 MG: 500 TABLET ORAL at 18:29

## 2025-07-22 RX ADMIN — IBUPROFEN 600 MG: 600 TABLET, FILM COATED ORAL at 20:30

## 2025-07-22 RX ADMIN — ACETAMINOPHEN 1000 MG: 500 TABLET ORAL at 12:00

## 2025-07-22 RX ADMIN — ACETAMINOPHEN 650 MG: 325 TABLET ORAL at 23:14

## 2025-07-22 RX ADMIN — Medication 10 ML: at 10:39

## 2025-07-22 RX ADMIN — KETOROLAC TROMETHAMINE 15 MG: 15 INJECTION, SOLUTION INTRAMUSCULAR; INTRAVENOUS at 02:56

## 2025-07-22 RX ADMIN — KETOROLAC TROMETHAMINE 15 MG: 15 INJECTION, SOLUTION INTRAMUSCULAR; INTRAVENOUS at 15:03

## 2025-07-22 RX ADMIN — KETOROLAC TROMETHAMINE 15 MG: 15 INJECTION, SOLUTION INTRAMUSCULAR; INTRAVENOUS at 10:20

## 2025-07-22 RX ADMIN — ACETAMINOPHEN 1000 MG: 500 TABLET ORAL at 05:44

## 2025-07-22 NOTE — ANESTHESIA POSTPROCEDURE EVALUATION
Patient: Anna Silva    Procedure Summary       Date: 25 Room / Location: [facility] LABOR DELIVERY  [facility] LABOR DELIVERY    Anesthesia Start:  Anesthesia Stop:     Procedure:  SECTION REPEAT (Abdomen) [facility]    Surgeons: Jane Olvera DO Provider: Darlyn Shane CRNA    Anesthesia Type: spinal ASA Status: 3            Anesthesia Type: spinal    Vitals  Vitals Value Taken Time   /89 25 08:08   Temp 36.4 °C (97.5 °F) 25 08:08   Pulse 67 25 08:08   Resp 18 25 08:08   SpO2 100 % 25 21:05   Vitals shown include unfiled device data.        Post Anesthesia Care and Evaluation    Patient location during evaluation: bedside  Patient participation: complete - patient participated  Level of consciousness: awake  Pain management: adequate    Airway patency: patent  Anesthetic complications: No anesthetic complications  PONV Status: controlled  Cardiovascular status: acceptable and stable  Respiratory status: acceptable  Hydration status: acceptable  Post Neuraxial Block status: Motor and sensory function returned to baseline and No signs or symptoms of PDPH

## 2025-07-22 NOTE — PLAN OF CARE
Goal Outcome Evaluation:  Plan of Care Reviewed With: patient        Progress: improving  Outcome Evaluation: Patient transferred to [facility]. Fundus firm, lochia light. UOP WNL, VS WNL. Pain controlled with scheduled toradol.

## 2025-07-22 NOTE — PROGRESS NOTES
MARIA D Reynoldsin  [facility] Delivery Progress Note    Subjective   Postpartum Day 1: [facility] Delivery    The patient feels well.  Her pain is well controlled.   She is ambulating well.  Patient describes her bleeding as moderate lochia.  Patient is tolerating regular diet and urinating without difficulty.    Breastfeeding:  Infant in NICU currently receiving breast donor milk.  Patient plans to breastfeed and start pumping today.    Objective     Vital Signs Range for the last 24 hours  Temperature: Temp:  [97.5 °F (36.4 °C)-98.4 °F (36.9 °C)] 97.5 °F (36.4 °C)   Temp Source: Temp src: Oral   BP: BP: ()/(58-97) 127/89   Pulse: Heart Rate:  [60-98] 67   Respirations: Resp:  [16-20] 18     Physical Exam:  General:  no acute distress.  Abdomen: abdomen is soft without significant tenderness, masses, organomegaly or guarding.   Fundus: appropriate, firm, non tender, moderate lochia  Incision: clean, dry and intact dressing intact  Extremities: normal, atraumatic, no cyanosis, and trace edema.     Rubella:   Rubella Antibodies, IgG   Date Value Ref Range Status   2025 7.68 Immune >0.99 index Final     Comment:                                     Non-immune       <0.90                                  Equivocal  0.90 - 0.99                                  Immune           >0.99     Rh Status:    RH type   Date Value Ref Range Status   2025 Positive  Final     Immunizations:   Immunization History   Administered Date(s) Administered    COVID-19 (PFIZER) Purple Cap Monovalent 2020, 2021, 2021    Tdap 2025       Assessment & Plan       Normal labor    Supervision of other normal pregnancy, antepartum    Previous  section    Hx of postpartum hemorrhage, currently pregnant    History of cold sores    GBS (group B Streptococcus carrier), +RV culture, currently pregnant    Elevated blood pressure reading    Vaginal bleeding in pregnancy, third trimester    Gestational hypertension,  third trimester    Single delivery by       Anna Silva is Day 1  post-partum  , Low Transverse  .      Plan:  Continue current care.            Infant in NICU stable    Lisa Ferrell MD      Electronically signed by Lisa Ferrell MD, 25, 10:48 AM EDT.

## 2025-07-22 NOTE — LACTATION NOTE
LC in to patient's bedside after previously speaking this morning with her in the NICU about pumping. Discussed double pumping every 3 hours for 15 min for stimulation and to help establish milk production. LC discussed normal expectations of pumping during the first week and why there is still the need to pump for hormonal stimulation. LC discussed that even drops are good for baby to get and provided oral swabs to collect these drops that she is getting and to take to baby. Breast milk storage for in hospital discussed. Discussed labeling/date and timing of pumped milk. Instructed on proper use and cleaning of pump and parts. LC placed a pumping schedule on her dry erase board to help her and support staff to keep track of pumping times. LC discussed good hands-on pumping guidelines. All questions answered. Mom expressed good understanding. Pt to call LC as needed while baby is admitted.    Patient pumped at this time and produced several drops collected on an oral swab, labeled, and brought to NICU infant's primary nurse. Pump parts washed by LC.

## 2025-07-22 NOTE — PLAN OF CARE
Goal Outcome Evaluation:  Plan of Care Reviewed With: patient        Progress: improving  Outcome Evaluation: VS WNL. Patient ambulated to bathroom with assistance x 1 @ 8 hours post-op. Catheter removed @ 8 hours post-op. Nausea subsided, patient able to tolerate PO fluids. Patient due to void by 0740.

## 2025-07-23 VITALS
BODY MASS INDEX: 29.26 KG/M2 | SYSTOLIC BLOOD PRESSURE: 131 MMHG | HEART RATE: 86 BPM | TEMPERATURE: 98.2 F | RESPIRATION RATE: 18 BRPM | WEIGHT: 159 LBS | DIASTOLIC BLOOD PRESSURE: 76 MMHG | HEIGHT: 62 IN | OXYGEN SATURATION: 100 %

## 2025-07-23 PROCEDURE — 0503F POSTPARTUM CARE VISIT: CPT | Performed by: OBSTETRICS & GYNECOLOGY

## 2025-07-23 RX ADMIN — ACETAMINOPHEN 650 MG: 325 TABLET ORAL at 05:55

## 2025-07-23 RX ADMIN — IBUPROFEN 600 MG: 600 TABLET, FILM COATED ORAL at 08:46

## 2025-07-23 RX ADMIN — IBUPROFEN 600 MG: 600 TABLET, FILM COATED ORAL at 02:55

## 2025-07-23 RX ADMIN — DOCUSATE SODIUM 100 MG: 100 CAPSULE, LIQUID FILLED ORAL at 08:46

## 2025-07-23 RX ADMIN — VALACYCLOVIR 1000 MG: 500 TABLET ORAL at 08:45

## 2025-07-23 NOTE — PLAN OF CARE
Goal Outcome Evaluation:           Progress: improving  Outcome Evaluation: VSS, Bleeding WDL, independent with care.  Incision WDL. Pain controlled with tylenol and ibuprofen.  Plan to dc home today

## 2025-07-23 NOTE — LACTATION NOTE
Mom is planning on discharge today. LC discussed storage and cleaning guidelines for the NICU and how to bring milk to the NICU (on ice). LC reminded mom the importance of pumping both breasts every 3 hours. LC discussed importance that pumping should not be painful and expected breast changes and management of engorgement.LC discussed checking to make sure new medications are safe to breastfeed. LC discussed alcohol use and cigarette/second hand smoke around baby and breastfeeding and discussed the impact of street drugs on infants and breastfeeding. LC used the page in the breastfeeding guide to discuss harmful effects of these. Breastfeeding/Lactation expectations and anticipatory guidance discussed for the next two weeks . LC discussed nipple care, plugged ducts, engorgement, and breast infection.  Mom demonstrated good understanding

## 2025-07-23 NOTE — PLAN OF CARE
Goal Outcome Evaluation:              Outcome Evaluation: VSS, assessment WNL.  Patient is up ad mervat, ambulating to NICU & providing self care.  Pt has showered and incision is approximated with staples in place.  Pt is tolerating a regular diet and is voiding spontaneously without difficulty.

## 2025-07-23 NOTE — PROGRESS NOTES
[facility]  [facility] Delivery Progress Note    Subjective   Postpartum Day 2: [facility] Delivery    The patient feels well.  Her pain is well controlled.   She is ambulating well.  Patient describes her bleeding as moderate lochia.  Patient is tolerating regular diet and urinating without difficulty.    Breastfeeding:  NICU, pumping    Objective     Vital Signs Range for the last 24 hours  Temperature: Temp:  [97.8 °F (36.6 °C)-98.2 °F (36.8 °C)] 98.2 °F (36.8 °C)   Temp Source: Temp src: Oral   BP: BP: (123-142)/(76-89) 131/76   Pulse: Heart Rate:  [74-94] 86   Respirations: Resp:  [16-20] 18     Physical Exam:  General:  no acute distress.  Abdomen: abdomen is soft without significant tenderness, masses, organomegaly or guarding.   Fundus: appropriate, firm, non tender, moderate lochia  Incision: clean, dry and intact  Extremities: normal, atraumatic, no cyanosis, and trace edema.     Rubella:   Rubella Antibodies, IgG   Date Value Ref Range Status   2025 7.68 Immune >0.99 index Final     Comment:                                     Non-immune       <0.90                                  Equivocal  0.90 - 0.99                                  Immune           >0.99     Rh Status:    RH type   Date Value Ref Range Status   2025 Positive  Final     Immunizations:   Immunization History   Administered Date(s) Administered    COVID-19 (PFIZER) Purple Cap Monovalent 2020, 2021, 2021    Tdap 2025       Assessment & Plan       Normal labor    Supervision of other normal pregnancy, antepartum    Previous  section    Hx of postpartum hemorrhage, currently pregnant    History of cold sores    GBS (group B Streptococcus carrier), +RV culture, currently pregnant    Elevated blood pressure reading    Vaginal bleeding in pregnancy, third trimester    Gestational hypertension, third trimester    Single delivery by     Benign gestational thrombocytopenia in third  trimester      Anmichael Urszula Shira is Day 2  post-partum  , Low Transverse  .      Plan:  Continue current care.  Will d/c home per pt request to room in      Electronically signed by Estelle Denise MD, 25, 9:05 AM EDT.

## 2025-07-24 ENCOUNTER — MATERNAL SCREENING (OUTPATIENT)
Dept: CALL CENTER | Facility: HOSPITAL | Age: 30
End: 2025-07-24
Payer: COMMERCIAL

## 2025-07-24 ENCOUNTER — LACTATION ENCOUNTER (OUTPATIENT)
Dept: OTHER | Facility: HOSPITAL | Age: 30
End: 2025-07-24

## 2025-07-24 PROBLEM — Z86.19 HISTORY OF COLD SORES: Status: RESOLVED | Noted: 2025-02-04 | Resolved: 2025-07-24

## 2025-07-24 PROBLEM — O46.93 VAGINAL BLEEDING IN PREGNANCY, THIRD TRIMESTER: Status: RESOLVED | Noted: 2025-07-21 | Resolved: 2025-07-24

## 2025-07-24 PROBLEM — Z87.59 HISTORY OF GESTATIONAL HYPERTENSION: Status: RESOLVED | Noted: 2025-01-07 | Resolved: 2025-07-24

## 2025-07-24 PROBLEM — R73.09 ELEVATED GLUCOSE TOLERANCE TEST: Status: RESOLVED | Noted: 2025-04-14 | Resolved: 2025-07-24

## 2025-07-24 NOTE — LACTATION NOTE
This note was copied from a baby's chart.  LC in to assist with this first feeding. Baby needs no oxygen support and does have a feeding tube for poor feeding. Baby showed good interest to breastfeed and latched easily. Mother's breasts were engorged so flow needed some massage to start. Baby showed low energy with this feeding so some mild stimulation used and baby had suckling bursts with swallows noted. He fed for about 5 minutes and then he stopped and burped and went to the opposite side. He fed on this side with a similar pattern. EMETERIO instructed Father how to assist with this feeding. EMETERIO later advised mother on cold therapy for her breasts then assisted with this pumping session and since they are planning on being at home tonight advised that she pump every 2 hours to help prevent further engorgement.

## 2025-07-24 NOTE — PROGRESS NOTES
"Post Delivery EARLY Follow up (1-2weeks)        Chief Complaint   Patient presents with    Postpartum Care     23 staples removed today 1 wk postpartum baby in [place]      Date of delivery: 2025  Delivery type:   LTCS  Feeding: Breast Pumping     HPI:     HA:  no  Vision changes:  no  RUQ/epigastric pain:  no  Swelling:  no    Pain:  yes  Vaginal Bleeding:  yes, very light changes a pad once a day    Depressed/Anxious:  no  EPDS score: 6   #10: 0    Weight at last OB OV: 159lb    Discharge Summary by Jane Olvera DO (2025 19:34)    Right side burning shooting pain, resolves w sitting up and holding it  Baby in NICU doing much better, eating more, possible DC from NICU soon    PHYSICAL EXAM:  /89   Pulse 102   Ht 157.5 cm (62\")   Wt 64 kg (141 lb)   LMP 10/31/2024   Breastfeeding Yes   BMI 25.79 kg/m²  13.2 kg (29 lb)  Chaperone present during breast and/or pelvic exam if performed.  General- NAD, alert and oriented, appropriate  Psych- Normal mood, good memory, good eye contact      STAPLES : All staples removed by MA, debbiei strips placed  INCISION : Clean, dry, intact, no evidence of infection, no evidence of hematoma or bulge w valsalva.  Right aspect of incision where she describes discomfort, stitch palpable.  Abdomen- Soft, non distended, non tender, no masses    Extremities- No edema, bilaterally equal, no signs of DVT    ASSESSMENT AND PLAN:  Diagnoses and all orders for this visit:    1. Gestational hypertension, third trimester (Primary)  Comments:  now PP  Orders:  -     CBC (No Diff)  -     Comprehensive Metabolic Panel  -     labetalol (NORMODYNE) 100 MG tablet; Take 1 tablet by mouth 2 (Two) Times a Day.  Dispense: 60 tablet; Refill: 0    2. Postoperative follow-up  Comments:  CS    3. Encounter for staple removal      Counseling:   Contact office or return to L+D (if after hours)  for HA unrelieved w rest/hydration/OTC meds, vision changes, increase in edema, " RUQ/epigastric pain, any concerns.  Check BPs at home.  Return to labor and delivery for persistently elevated blood pressures  Postpartum/Postop  precautions reviewed.  Incisional care reviewed, keep clean and dry.   plans vasectomy, local contact urology provided.    Reassurance regarding normal exam, we did discuss with her scar tissue and bleeding an extra stitch was required on that right side, I suspect that that is where her discomfort is coming from.  Exam is consistent with normal postop healing and intra-op course    Follow Up:  Return in about 1 week (around 2025) for FU BP.            Jane Olvera,   2025    [facility] [place] [place] [facility] [address] [facility]  [address] [place] [address] [address] [place] [address]  Dept: [phone]  Dept Fax: [phone]  Loc: [phone]  Loc Fax: [phone]

## 2025-07-24 NOTE — OUTREACH NOTE
Maternal Screening Survey      Flowsheet Row Responses   Eligibility Eligible   Prep survey completed? Yes   Facility patient discharged from? [facility] - Registered Nurse

## 2025-07-28 ENCOUNTER — LAB (OUTPATIENT)
Facility: HOSPITAL | Age: 30
End: 2025-07-28
Payer: COMMERCIAL

## 2025-07-28 ENCOUNTER — POSTPARTUM VISIT (OUTPATIENT)
Dept: OBSTETRICS AND GYNECOLOGY | Age: 30
End: 2025-07-28
Payer: COMMERCIAL

## 2025-07-28 VITALS
HEIGHT: 62 IN | SYSTOLIC BLOOD PRESSURE: 141 MMHG | HEART RATE: 102 BPM | BODY MASS INDEX: 25.95 KG/M2 | WEIGHT: 141 LBS | DIASTOLIC BLOOD PRESSURE: 89 MMHG

## 2025-07-28 DIAGNOSIS — O13.3 GESTATIONAL HYPERTENSION, THIRD TRIMESTER: Primary | ICD-10-CM

## 2025-07-28 DIAGNOSIS — Z09 POSTOPERATIVE FOLLOW-UP: ICD-10-CM

## 2025-07-28 DIAGNOSIS — Z48.02 ENCOUNTER FOR STAPLE REMOVAL: ICD-10-CM

## 2025-07-28 LAB
ALBUMIN SERPL-MCNC: 3.7 G/DL (ref 3.5–5.2)
ALBUMIN/GLOB SERPL: 1.1 G/DL
ALP SERPL-CCNC: 116 U/L (ref 39–117)
ALT SERPL W P-5'-P-CCNC: 27 U/L (ref 1–33)
ANION GAP SERPL CALCULATED.3IONS-SCNC: 11 MMOL/L (ref 5–15)
AST SERPL-CCNC: 27 U/L (ref 1–32)
BILIRUB SERPL-MCNC: 0.3 MG/DL (ref 0–1.2)
BUN SERPL-MCNC: 11 MG/DL (ref 6–20)
BUN/CREAT SERPL: 12.4 (ref 7–25)
CALCIUM SPEC-SCNC: 9.1 MG/DL (ref 8.6–10.5)
CHLORIDE SERPL-SCNC: 103 MMOL/L (ref 98–107)
CO2 SERPL-SCNC: 24 MMOL/L (ref 22–29)
CREAT SERPL-MCNC: 0.89 MG/DL (ref 0.57–1)
DEPRECATED RDW RBC AUTO: 40 FL (ref 37–54)
EGFRCR SERPLBLD CKD-EPI 2021: 88.5 ML/MIN/1.73
ERYTHROCYTE [DISTWIDTH] IN BLOOD BY AUTOMATED COUNT: 12.4 % (ref 12.3–15.4)
GLOBULIN UR ELPH-MCNC: 3.3 GM/DL
GLUCOSE SERPL-MCNC: 67 MG/DL (ref 65–99)
HCT VFR BLD AUTO: 37.5 % (ref 34–46.6)
HGB BLD-MCNC: 12 G/DL (ref 12–15.9)
MCH RBC QN AUTO: 28.5 PG (ref 26.6–33)
MCHC RBC AUTO-ENTMCNC: 32 G/DL (ref 31.5–35.7)
MCV RBC AUTO: 89.1 FL (ref 79–97)
PLATELET # BLD AUTO: 271 10*3/MM3 (ref 140–450)
PMV BLD AUTO: 9.6 FL (ref 6–12)
POTASSIUM SERPL-SCNC: 4 MMOL/L (ref 3.5–5.2)
PROT SERPL-MCNC: 7 G/DL (ref 6–8.5)
RBC # BLD AUTO: 4.21 10*6/MM3 (ref 3.77–5.28)
SODIUM SERPL-SCNC: 138 MMOL/L (ref 136–145)
WBC NRBC COR # BLD AUTO: 8.11 10*3/MM3 (ref 3.4–10.8)

## 2025-07-28 PROCEDURE — 85027 COMPLETE CBC AUTOMATED: CPT | Performed by: OBSTETRICS & GYNECOLOGY

## 2025-07-28 PROCEDURE — 36415 COLL VENOUS BLD VENIPUNCTURE: CPT | Performed by: OBSTETRICS & GYNECOLOGY

## 2025-07-28 PROCEDURE — 80053 COMPREHEN METABOLIC PANEL: CPT | Performed by: OBSTETRICS & GYNECOLOGY

## 2025-07-28 RX ORDER — LABETALOL 100 MG/1
100 TABLET, FILM COATED ORAL 2 TIMES DAILY
Qty: 60 TABLET | Refills: 0 | Status: SHIPPED | OUTPATIENT
Start: 2025-07-28

## 2025-07-29 ENCOUNTER — LACTATION ENCOUNTER (OUTPATIENT)
Dept: OTHER | Facility: HOSPITAL | Age: 30
End: 2025-07-29

## 2025-07-29 NOTE — LACTATION NOTE
This note was copied from a baby's chart.  Lc in to see this mother in her ONR. She is now pumping around 100 ml every 3 hours. She is having no pain and baby is going to the breast 1 time/day. Mother states baby transferred 30 ml at the breast.

## 2025-08-01 ENCOUNTER — MATERNAL SCREENING (OUTPATIENT)
Dept: CALL CENTER | Facility: HOSPITAL | Age: 30
End: 2025-08-01
Payer: COMMERCIAL

## 2025-08-01 NOTE — OUTREACH NOTE
Maternal Screening Survey      Flowsheet Row Responses   Facility patient discharged from? [facility]   Attempt successful? Yes   Call start time 1318   Call end time 1326   I have been able to laugh and see the funny side of things. 0   I have looked forward with enjoyment to things. 0   I have blamed myself unnecessarily when things went wrong. 2   I have been anxious or worried for no good reason. 1   I have felt scared or panicky for no good reason. 0   Things have been getting on top of me. 1   I have been so unhappy that I have had difficulty sleeping. 0   I have felt sad or miserable. 1   I have been so unhappy that I have been crying. 1   The thought of harming myself has occurred to me. 0   [place]  Depression Scale Total 6   Did any of your parents have problems with alcohol or drug use? No   Do any of your peers have problems with alcohol or drug use? No   Does your partner have problems with alcohol or drug use? No   Before you were pregnant did you have problems with alcohol or drug use? (past) No   In the past month, did you drink beer, wine, liquor or use any other drugs? (pregnancy) No   Maternal Screening call completed Yes              Sonia Ivy Registered Nurse

## 2025-08-01 NOTE — OUTREACH NOTE
Maternal Screening Survey      Flowsheet Row Responses   Facility patient discharged from? [facility]   Attempt successful? No   Unsuccessful attempts Attempt 1              Sonia WEEKS - Registered Nurse

## 2025-08-04 ENCOUNTER — POSTPARTUM VISIT (OUTPATIENT)
Dept: OBSTETRICS AND GYNECOLOGY | Age: 30
End: 2025-08-04
Payer: COMMERCIAL

## 2025-08-04 VITALS
HEIGHT: 62 IN | SYSTOLIC BLOOD PRESSURE: 114 MMHG | WEIGHT: 138 LBS | BODY MASS INDEX: 25.4 KG/M2 | HEART RATE: 96 BPM | DIASTOLIC BLOOD PRESSURE: 77 MMHG

## 2025-08-04 DIAGNOSIS — O13.3 GESTATIONAL HYPERTENSION, THIRD TRIMESTER: Primary | ICD-10-CM

## 2025-08-04 PROCEDURE — 99213 OFFICE O/P EST LOW 20 MIN: CPT | Performed by: OBSTETRICS & GYNECOLOGY

## 2025-08-09 ENCOUNTER — TELEPHONE (OUTPATIENT)
Dept: LACTATION | Facility: HOSPITAL | Age: 30
End: 2025-08-09
Payer: COMMERCIAL

## 2025-08-21 PROBLEM — Z34.80 SUPERVISION OF OTHER NORMAL PREGNANCY, ANTEPARTUM: Status: RESOLVED | Noted: 2025-01-07 | Resolved: 2025-08-21

## 2025-08-21 PROBLEM — O99.113 BENIGN GESTATIONAL THROMBOCYTOPENIA IN THIRD TRIMESTER: Status: RESOLVED | Noted: 2025-07-22 | Resolved: 2025-08-21

## 2025-08-21 PROBLEM — D69.6 BENIGN GESTATIONAL THROMBOCYTOPENIA IN THIRD TRIMESTER: Status: RESOLVED | Noted: 2025-07-22 | Resolved: 2025-08-21

## 2025-08-21 PROBLEM — O13.3 GESTATIONAL HYPERTENSION, THIRD TRIMESTER: Status: RESOLVED | Noted: 2025-07-21 | Resolved: 2025-08-21

## 2025-08-21 PROBLEM — O09.299 HX OF POSTPARTUM HEMORRHAGE, CURRENTLY PREGNANT: Status: RESOLVED | Noted: 2025-01-07 | Resolved: 2025-08-21

## 2025-08-22 ENCOUNTER — POSTPARTUM VISIT (OUTPATIENT)
Dept: OBSTETRICS AND GYNECOLOGY | Age: 30
End: 2025-08-22
Payer: COMMERCIAL

## 2025-08-22 VITALS
HEART RATE: 68 BPM | HEIGHT: 62 IN | WEIGHT: 141 LBS | SYSTOLIC BLOOD PRESSURE: 109 MMHG | BODY MASS INDEX: 25.95 KG/M2 | DIASTOLIC BLOOD PRESSURE: 77 MMHG

## 2025-08-22 DIAGNOSIS — Z30.09 BIRTH CONTROL COUNSELING: ICD-10-CM

## (undated) DEVICE — SUT VICRYL 3/0 CT1 27IN J258H

## (undated) DEVICE — DEV TRANSF BLD W/LUER ADPT CA/198

## (undated) DEVICE — PAD GRND REM POLYHESIVE A/ DISP

## (undated) DEVICE — C SECTION PACK: Brand: MEDLINE INDUSTRIES, INC.

## (undated) DEVICE — NEEDLE,18GX1.5",REG,BEVEL: Brand: MEDLINE

## (undated) DEVICE — GLV SURG BIOGEL LTX PF 6 1/2

## (undated) DEVICE — SUT MNCRYL 3/0 CT1 36 IN Y944H

## (undated) DEVICE — GOWN,SIRUS,POLYRNF,BRTHSLV,XLN/XXL,18/CS: Brand: MEDLINE

## (undated) DEVICE — SUT VIC 0 CTX 36IN J978H

## (undated) DEVICE — STERILE POLYISOPRENE POWDER-FREE SURGICAL GLOVES WITH EMOLLIENT COATING: Brand: PROTEXIS

## (undated) DEVICE — THE STERILE LIGHT HANDLE COVER IS USED WITH STERIS SURGICAL LIGHTING AND VISUALIZATION SYSTEMS.

## (undated) DEVICE — SUT CHRM 0 CT1 36IN 924H

## (undated) DEVICE — KT ABG SAMPL PROVENT/PLS 1CC 25G W/2NDARY/NDL/23G

## (undated) DEVICE — CVR HNDL LT SURG ACCSSRY BLU STRL

## (undated) DEVICE — VIOLET BRAIDED (POLYGLACTIN 910), SYNTHETIC ABSORBABLE SUTURE: Brand: COATED VICRYL

## (undated) DEVICE — TRY CATH FOL ADVANCE SIL W/BAG 16F

## (undated) DEVICE — Device: Brand: PORTEX

## (undated) DEVICE — PROXIMATE RH ROTATING HEAD SKIN STAPLERS (35 WIDE) CONTAINS 35 STAINLESS STEEL STAPLES: Brand: PROXIMATE

## (undated) DEVICE — INTENDED FOR TISSUE SEPARATION, AND OTHER PROCEDURES THAT REQUIRE A SHARP SURGICAL BLADE TO PUNCTURE OR CUT.: Brand: BARD-PARKER ® CARBON RIB-BACK BLADES